# Patient Record
Sex: FEMALE | Race: WHITE | NOT HISPANIC OR LATINO | Employment: OTHER | URBAN - METROPOLITAN AREA
[De-identification: names, ages, dates, MRNs, and addresses within clinical notes are randomized per-mention and may not be internally consistent; named-entity substitution may affect disease eponyms.]

---

## 2018-04-19 PROBLEM — D63.1 ANEMIA IN STAGE 3 CHRONIC KIDNEY DISEASE: Status: ACTIVE | Noted: 2018-04-19

## 2018-04-19 PROBLEM — N18.30 CHRONIC KIDNEY DISEASE, STAGE III (MODERATE): Status: ACTIVE | Noted: 2018-04-19

## 2018-04-19 PROBLEM — N18.30 ANEMIA IN STAGE 3 CHRONIC KIDNEY DISEASE: Status: ACTIVE | Noted: 2018-04-19

## 2018-04-19 PROBLEM — I10 ESSENTIAL HYPERTENSION: Status: ACTIVE | Noted: 2018-04-19

## 2018-04-19 PROBLEM — N25.81 SECONDARY HYPERPARATHYROIDISM OF RENAL ORIGIN: Status: ACTIVE | Noted: 2018-04-19

## 2018-10-18 PROBLEM — N18.2 CKD (CHRONIC KIDNEY DISEASE) STAGE 2, GFR 60-89 ML/MIN: Status: ACTIVE | Noted: 2018-10-18

## 2019-05-23 PROBLEM — N18.2 ANEMIA IN STAGE 2 CHRONIC KIDNEY DISEASE: Status: ACTIVE | Noted: 2018-04-19

## 2019-05-23 PROBLEM — N18.30 CHRONIC KIDNEY DISEASE, STAGE III (MODERATE): Status: RESOLVED | Noted: 2018-04-19 | Resolved: 2019-05-23

## 2020-03-03 PROBLEM — E55.9 VITAMIN D DEFICIENCY: Status: ACTIVE | Noted: 2020-03-03

## 2020-03-03 PROBLEM — N18.30 CHRONIC KIDNEY DISEASE, STAGE III (MODERATE): Status: RESOLVED | Noted: 2018-04-19 | Resolved: 2020-03-03

## 2020-10-01 ENCOUNTER — LAB VISIT (OUTPATIENT)
Dept: LAB | Facility: HOSPITAL | Age: 85
End: 2020-10-01
Attending: INTERNAL MEDICINE
Payer: MEDICARE

## 2020-10-01 DIAGNOSIS — N25.81 SECONDARY HYPERPARATHYROIDISM OF RENAL ORIGIN: ICD-10-CM

## 2020-10-01 DIAGNOSIS — D63.1 ANEMIA IN STAGE 2 CHRONIC KIDNEY DISEASE: ICD-10-CM

## 2020-10-01 DIAGNOSIS — N18.2 ANEMIA IN STAGE 2 CHRONIC KIDNEY DISEASE: ICD-10-CM

## 2020-10-01 DIAGNOSIS — N18.30 CHRONIC KIDNEY DISEASE, STAGE III (MODERATE): ICD-10-CM

## 2020-10-01 DIAGNOSIS — E55.9 VITAMIN D DEFICIENCY: ICD-10-CM

## 2020-10-01 LAB
25(OH)D3+25(OH)D2 SERPL-MCNC: 36 NG/ML (ref 30–96)
ALBUMIN SERPL BCP-MCNC: 3.9 G/DL (ref 3.5–5.2)
ANION GAP SERPL CALC-SCNC: 9 MMOL/L (ref 8–16)
BASOPHILS # BLD AUTO: 0.03 K/UL (ref 0–0.2)
BASOPHILS NFR BLD: 0.6 % (ref 0–1.9)
BUN SERPL-MCNC: 22 MG/DL (ref 8–23)
CALCIUM SERPL-MCNC: 9.5 MG/DL (ref 8.7–10.5)
CALCIUM SERPL-MCNC: 9.5 MG/DL (ref 8.7–10.5)
CHLORIDE SERPL-SCNC: 103 MMOL/L (ref 95–110)
CO2 SERPL-SCNC: 26 MMOL/L (ref 23–29)
CREAT SERPL-MCNC: 0.9 MG/DL (ref 0.5–1.4)
DIFFERENTIAL METHOD: ABNORMAL
EOSINOPHIL # BLD AUTO: 0.2 K/UL (ref 0–0.5)
EOSINOPHIL NFR BLD: 4.5 % (ref 0–8)
ERYTHROCYTE [DISTWIDTH] IN BLOOD BY AUTOMATED COUNT: 13.2 % (ref 11.5–14.5)
EST. GFR  (AFRICAN AMERICAN): >60 ML/MIN/1.73 M^2
EST. GFR  (NON AFRICAN AMERICAN): 59 ML/MIN/1.73 M^2
GLUCOSE SERPL-MCNC: 87 MG/DL (ref 70–110)
HCT VFR BLD AUTO: 36.6 % (ref 37–48.5)
HGB BLD-MCNC: 11.7 G/DL (ref 12–16)
IMM GRANULOCYTES # BLD AUTO: 0.01 K/UL (ref 0–0.04)
IMM GRANULOCYTES NFR BLD AUTO: 0.2 % (ref 0–0.5)
LYMPHOCYTES # BLD AUTO: 1.6 K/UL (ref 1–4.8)
LYMPHOCYTES NFR BLD: 30.5 % (ref 18–48)
MCH RBC QN AUTO: 29 PG (ref 27–31)
MCHC RBC AUTO-ENTMCNC: 32 G/DL (ref 32–36)
MCV RBC AUTO: 91 FL (ref 82–98)
MONOCYTES # BLD AUTO: 0.5 K/UL (ref 0.3–1)
MONOCYTES NFR BLD: 9.8 % (ref 4–15)
NEUTROPHILS # BLD AUTO: 2.8 K/UL (ref 1.8–7.7)
NEUTROPHILS NFR BLD: 54.4 % (ref 38–73)
NRBC BLD-RTO: 0 /100 WBC
PHOSPHATE SERPL-MCNC: 3 MG/DL (ref 2.7–4.5)
PLATELET # BLD AUTO: 202 K/UL (ref 150–350)
PMV BLD AUTO: 12.1 FL (ref 9.2–12.9)
POTASSIUM SERPL-SCNC: 4.5 MMOL/L (ref 3.5–5.1)
PTH-INTACT SERPL-MCNC: 81.5 PG/ML (ref 9–77)
RBC # BLD AUTO: 4.03 M/UL (ref 4–5.4)
SODIUM SERPL-SCNC: 138 MMOL/L (ref 136–145)
WBC # BLD AUTO: 5.08 K/UL (ref 3.9–12.7)

## 2020-10-01 PROCEDURE — 82306 VITAMIN D 25 HYDROXY: CPT

## 2020-10-01 PROCEDURE — 84100 ASSAY OF PHOSPHORUS: CPT

## 2020-10-01 PROCEDURE — 36415 COLL VENOUS BLD VENIPUNCTURE: CPT

## 2020-10-01 PROCEDURE — 85025 COMPLETE CBC W/AUTO DIFF WBC: CPT

## 2020-10-01 PROCEDURE — 82040 ASSAY OF SERUM ALBUMIN: CPT

## 2020-10-01 PROCEDURE — 83970 ASSAY OF PARATHORMONE: CPT

## 2020-10-01 PROCEDURE — 80048 BASIC METABOLIC PNL TOTAL CA: CPT

## 2020-10-08 PROBLEM — D64.9 ANEMIA: Status: ACTIVE | Noted: 2018-04-19

## 2020-10-08 PROBLEM — N18.31 STAGE 3A CHRONIC KIDNEY DISEASE: Status: ACTIVE | Noted: 2018-04-19

## 2020-11-19 PROBLEM — N18.2 CKD (CHRONIC KIDNEY DISEASE) STAGE 2, GFR 60-89 ML/MIN: Status: RESOLVED | Noted: 2018-10-18 | Resolved: 2020-11-19

## 2020-11-19 PROBLEM — I10 ESSENTIAL HYPERTENSION: Chronic | Status: ACTIVE | Noted: 2018-04-19

## 2020-11-19 PROBLEM — N18.31 STAGE 3A CHRONIC KIDNEY DISEASE: Chronic | Status: ACTIVE | Noted: 2018-04-19

## 2020-11-19 PROBLEM — N25.81 SECONDARY HYPERPARATHYROIDISM OF RENAL ORIGIN: Chronic | Status: ACTIVE | Noted: 2018-04-19

## 2021-03-15 ENCOUNTER — LAB VISIT (OUTPATIENT)
Dept: LAB | Facility: HOSPITAL | Age: 86
End: 2021-03-15
Attending: INTERNAL MEDICINE
Payer: MEDICARE

## 2021-03-15 DIAGNOSIS — N18.31 STAGE 3A CHRONIC KIDNEY DISEASE: ICD-10-CM

## 2021-03-15 LAB
BILIRUB UR QL STRIP: NEGATIVE
CLARITY UR REFRACT.AUTO: CLEAR
COLOR UR AUTO: YELLOW
GLUCOSE UR QL STRIP: NEGATIVE
HGB UR QL STRIP: ABNORMAL
KETONES UR QL STRIP: NEGATIVE
LEUKOCYTE ESTERASE UR QL STRIP: ABNORMAL
MICROSCOPIC COMMENT: NORMAL
NITRITE UR QL STRIP: NEGATIVE
PH UR STRIP: 6 [PH] (ref 5–8)
PROT UR QL STRIP: NEGATIVE
RBC #/AREA URNS AUTO: 1 /HPF (ref 0–4)
SP GR UR STRIP: 1.01 (ref 1–1.03)
SQUAMOUS #/AREA URNS AUTO: 1 /HPF
URN SPEC COLLECT METH UR: ABNORMAL
UROBILINOGEN UR STRIP-ACNC: NEGATIVE EU/DL
WBC #/AREA URNS AUTO: 1 /HPF (ref 0–5)

## 2021-05-27 PROBLEM — E78.00 HIGH CHOLESTEROL: Chronic | Status: ACTIVE | Noted: 2021-05-27

## 2021-05-27 PROBLEM — E55.9 VITAMIN D DEFICIENCY: Chronic | Status: ACTIVE | Noted: 2020-03-03

## 2021-05-27 PROBLEM — E78.00 HIGH CHOLESTEROL: Status: ACTIVE | Noted: 2021-05-27

## 2021-09-24 ENCOUNTER — LAB VISIT (OUTPATIENT)
Dept: LAB | Facility: HOSPITAL | Age: 86
End: 2021-09-24
Attending: INTERNAL MEDICINE
Payer: MEDICARE

## 2021-09-24 DIAGNOSIS — I10 ESSENTIAL HYPERTENSION: Chronic | ICD-10-CM

## 2021-09-24 DIAGNOSIS — E55.9 VITAMIN D DEFICIENCY: ICD-10-CM

## 2021-09-24 DIAGNOSIS — E87.20 ACIDOSIS, METABOLIC: ICD-10-CM

## 2021-09-24 DIAGNOSIS — N25.81 SECONDARY HYPERPARATHYROIDISM OF RENAL ORIGIN: Chronic | ICD-10-CM

## 2021-09-24 DIAGNOSIS — D64.9 ANEMIA, UNSPECIFIED TYPE: ICD-10-CM

## 2021-09-24 DIAGNOSIS — N18.31 STAGE 3A CHRONIC KIDNEY DISEASE: Chronic | ICD-10-CM

## 2021-09-24 LAB
25(OH)D3+25(OH)D2 SERPL-MCNC: 49 NG/ML (ref 30–96)
ALBUMIN SERPL BCP-MCNC: 3.9 G/DL (ref 3.5–5.2)
ANION GAP SERPL CALC-SCNC: 9 MMOL/L (ref 8–16)
BASOPHILS # BLD AUTO: 0.02 K/UL (ref 0–0.2)
BASOPHILS NFR BLD: 0.4 % (ref 0–1.9)
BUN SERPL-MCNC: 20 MG/DL (ref 8–23)
CALCIUM SERPL-MCNC: 10.3 MG/DL (ref 8.7–10.5)
CALCIUM SERPL-MCNC: 10.3 MG/DL (ref 8.7–10.5)
CHLORIDE SERPL-SCNC: 103 MMOL/L (ref 95–110)
CO2 SERPL-SCNC: 25 MMOL/L (ref 23–29)
CREAT SERPL-MCNC: 0.9 MG/DL (ref 0.5–1.4)
DIFFERENTIAL METHOD: ABNORMAL
EOSINOPHIL # BLD AUTO: 0.1 K/UL (ref 0–0.5)
EOSINOPHIL NFR BLD: 2.6 % (ref 0–8)
ERYTHROCYTE [DISTWIDTH] IN BLOOD BY AUTOMATED COUNT: 14.2 % (ref 11.5–14.5)
EST. GFR  (AFRICAN AMERICAN): >60 ML/MIN/1.73 M^2
EST. GFR  (NON AFRICAN AMERICAN): 59 ML/MIN/1.73 M^2
GLUCOSE SERPL-MCNC: 87 MG/DL (ref 70–110)
HCT VFR BLD AUTO: 38.2 % (ref 37–48.5)
HGB BLD-MCNC: 12.1 G/DL (ref 12–16)
IMM GRANULOCYTES # BLD AUTO: 0 K/UL (ref 0–0.04)
IMM GRANULOCYTES NFR BLD AUTO: 0 % (ref 0–0.5)
LYMPHOCYTES # BLD AUTO: 1.5 K/UL (ref 1–4.8)
LYMPHOCYTES NFR BLD: 32.9 % (ref 18–48)
MCH RBC QN AUTO: 28.3 PG (ref 27–31)
MCHC RBC AUTO-ENTMCNC: 31.7 G/DL (ref 32–36)
MCV RBC AUTO: 89 FL (ref 82–98)
MONOCYTES # BLD AUTO: 0.4 K/UL (ref 0.3–1)
MONOCYTES NFR BLD: 9.2 % (ref 4–15)
NEUTROPHILS # BLD AUTO: 2.5 K/UL (ref 1.8–7.7)
NEUTROPHILS NFR BLD: 54.9 % (ref 38–73)
NRBC BLD-RTO: 0 /100 WBC
PHOSPHATE SERPL-MCNC: 2.9 MG/DL (ref 2.7–4.5)
PLATELET # BLD AUTO: 208 K/UL (ref 150–450)
PMV BLD AUTO: 11.6 FL (ref 9.2–12.9)
POTASSIUM SERPL-SCNC: 4.3 MMOL/L (ref 3.5–5.1)
PTH-INTACT SERPL-MCNC: 73.8 PG/ML (ref 9–77)
RBC # BLD AUTO: 4.28 M/UL (ref 4–5.4)
SODIUM SERPL-SCNC: 137 MMOL/L (ref 136–145)
WBC # BLD AUTO: 4.59 K/UL (ref 3.9–12.7)

## 2021-09-24 PROCEDURE — 82040 ASSAY OF SERUM ALBUMIN: CPT | Performed by: INTERNAL MEDICINE

## 2021-09-24 PROCEDURE — 84100 ASSAY OF PHOSPHORUS: CPT | Performed by: INTERNAL MEDICINE

## 2021-09-24 PROCEDURE — 82306 VITAMIN D 25 HYDROXY: CPT | Performed by: INTERNAL MEDICINE

## 2021-09-24 PROCEDURE — 80048 BASIC METABOLIC PNL TOTAL CA: CPT | Performed by: INTERNAL MEDICINE

## 2021-09-24 PROCEDURE — 36415 COLL VENOUS BLD VENIPUNCTURE: CPT | Performed by: INTERNAL MEDICINE

## 2021-09-24 PROCEDURE — 83970 ASSAY OF PARATHORMONE: CPT | Performed by: INTERNAL MEDICINE

## 2021-09-24 PROCEDURE — 85025 COMPLETE CBC W/AUTO DIFF WBC: CPT | Performed by: INTERNAL MEDICINE

## 2021-11-12 ENCOUNTER — LAB VISIT (OUTPATIENT)
Dept: LAB | Facility: HOSPITAL | Age: 86
End: 2021-11-12
Attending: INTERNAL MEDICINE
Payer: MEDICARE

## 2021-11-12 DIAGNOSIS — D63.1 ANEMIA DUE TO STAGE 3A CHRONIC KIDNEY DISEASE: ICD-10-CM

## 2021-11-12 DIAGNOSIS — N18.31 STAGE 3A CHRONIC KIDNEY DISEASE: ICD-10-CM

## 2021-11-12 DIAGNOSIS — E55.9 VITAMIN D DEFICIENCY: Chronic | ICD-10-CM

## 2021-11-12 DIAGNOSIS — N25.81 SECONDARY HYPERPARATHYROIDISM OF RENAL ORIGIN: ICD-10-CM

## 2021-11-12 DIAGNOSIS — E87.20 ACIDOSIS, METABOLIC: ICD-10-CM

## 2021-11-12 DIAGNOSIS — N18.31 ANEMIA DUE TO STAGE 3A CHRONIC KIDNEY DISEASE: ICD-10-CM

## 2021-11-12 DIAGNOSIS — I10 PRIMARY HYPERTENSION: ICD-10-CM

## 2021-11-12 LAB
25(OH)D3+25(OH)D2 SERPL-MCNC: 43 NG/ML (ref 30–96)
ALBUMIN SERPL BCP-MCNC: 3.9 G/DL (ref 3.5–5.2)
ANION GAP SERPL CALC-SCNC: 8 MMOL/L (ref 8–16)
BASOPHILS # BLD AUTO: 0.03 K/UL (ref 0–0.2)
BASOPHILS NFR BLD: 0.6 % (ref 0–1.9)
BUN SERPL-MCNC: 23 MG/DL (ref 8–23)
CALCIUM SERPL-MCNC: 9.8 MG/DL (ref 8.7–10.5)
CALCIUM SERPL-MCNC: 9.8 MG/DL (ref 8.7–10.5)
CHLORIDE SERPL-SCNC: 102 MMOL/L (ref 95–110)
CO2 SERPL-SCNC: 25 MMOL/L (ref 23–29)
CREAT SERPL-MCNC: 0.9 MG/DL (ref 0.5–1.4)
DIFFERENTIAL METHOD: ABNORMAL
EOSINOPHIL # BLD AUTO: 0.1 K/UL (ref 0–0.5)
EOSINOPHIL NFR BLD: 2.2 % (ref 0–8)
ERYTHROCYTE [DISTWIDTH] IN BLOOD BY AUTOMATED COUNT: 13.2 % (ref 11.5–14.5)
EST. GFR  (AFRICAN AMERICAN): >60 ML/MIN/1.73 M^2
EST. GFR  (NON AFRICAN AMERICAN): 59 ML/MIN/1.73 M^2
GLUCOSE SERPL-MCNC: 95 MG/DL (ref 70–110)
HCT VFR BLD AUTO: 35.4 % (ref 37–48.5)
HGB BLD-MCNC: 11.8 G/DL (ref 12–16)
IMM GRANULOCYTES # BLD AUTO: 0 K/UL (ref 0–0.04)
IMM GRANULOCYTES NFR BLD AUTO: 0 % (ref 0–0.5)
LYMPHOCYTES # BLD AUTO: 1.5 K/UL (ref 1–4.8)
LYMPHOCYTES NFR BLD: 29.2 % (ref 18–48)
MCH RBC QN AUTO: 29.7 PG (ref 27–31)
MCHC RBC AUTO-ENTMCNC: 33.3 G/DL (ref 32–36)
MCV RBC AUTO: 89 FL (ref 82–98)
MONOCYTES # BLD AUTO: 0.4 K/UL (ref 0.3–1)
MONOCYTES NFR BLD: 8.6 % (ref 4–15)
NEUTROPHILS # BLD AUTO: 3 K/UL (ref 1.8–7.7)
NEUTROPHILS NFR BLD: 59.4 % (ref 38–73)
NRBC BLD-RTO: 0 /100 WBC
PHOSPHATE SERPL-MCNC: 2.7 MG/DL (ref 2.7–4.5)
PLATELET # BLD AUTO: 203 K/UL (ref 150–450)
PMV BLD AUTO: 11.3 FL (ref 9.2–12.9)
POTASSIUM SERPL-SCNC: 4.1 MMOL/L (ref 3.5–5.1)
PTH-INTACT SERPL-MCNC: 69.4 PG/ML (ref 9–77)
RBC # BLD AUTO: 3.97 M/UL (ref 4–5.4)
SODIUM SERPL-SCNC: 135 MMOL/L (ref 136–145)
WBC # BLD AUTO: 5.1 K/UL (ref 3.9–12.7)

## 2021-11-12 PROCEDURE — 83970 ASSAY OF PARATHORMONE: CPT | Performed by: INTERNAL MEDICINE

## 2021-11-12 PROCEDURE — 84100 ASSAY OF PHOSPHORUS: CPT | Performed by: INTERNAL MEDICINE

## 2021-11-12 PROCEDURE — 80048 BASIC METABOLIC PNL TOTAL CA: CPT | Performed by: INTERNAL MEDICINE

## 2021-11-12 PROCEDURE — 82306 VITAMIN D 25 HYDROXY: CPT | Performed by: INTERNAL MEDICINE

## 2021-11-12 PROCEDURE — 36415 COLL VENOUS BLD VENIPUNCTURE: CPT | Performed by: INTERNAL MEDICINE

## 2021-11-12 PROCEDURE — 82040 ASSAY OF SERUM ALBUMIN: CPT | Performed by: INTERNAL MEDICINE

## 2021-11-12 PROCEDURE — 85025 COMPLETE CBC W/AUTO DIFF WBC: CPT | Performed by: INTERNAL MEDICINE

## 2022-03-29 ENCOUNTER — LAB VISIT (OUTPATIENT)
Dept: LAB | Facility: HOSPITAL | Age: 87
End: 2022-03-29
Attending: INTERNAL MEDICINE
Payer: MEDICARE

## 2022-03-29 DIAGNOSIS — N18.31 STAGE 3A CHRONIC KIDNEY DISEASE: Chronic | ICD-10-CM

## 2022-03-29 LAB
BILIRUB UR QL STRIP: NEGATIVE
CLARITY UR: ABNORMAL
COLOR UR: COLORLESS
GLUCOSE UR QL STRIP: NEGATIVE
HGB UR QL STRIP: NEGATIVE
KETONES UR QL STRIP: NEGATIVE
LEUKOCYTE ESTERASE UR QL STRIP: ABNORMAL
MICROSCOPIC COMMENT: NORMAL
NITRITE UR QL STRIP: NEGATIVE
PH UR STRIP: 6 [PH] (ref 5–8)
PROT UR QL STRIP: NEGATIVE
SP GR UR STRIP: <1.005 (ref 1–1.03)
URN SPEC COLLECT METH UR: ABNORMAL
UROBILINOGEN UR STRIP-ACNC: NEGATIVE EU/DL
WBC #/AREA URNS HPF: 2 /HPF (ref 0–5)

## 2022-03-29 PROCEDURE — 81000 URINALYSIS NONAUTO W/SCOPE: CPT | Performed by: INTERNAL MEDICINE

## 2022-04-05 PROBLEM — I10 ESSENTIAL HYPERTENSION: Status: ACTIVE | Noted: 2022-04-05

## 2022-04-05 PROBLEM — N18.31 ANEMIA DUE TO STAGE 3A CHRONIC KIDNEY DISEASE: Status: ACTIVE | Noted: 2018-04-19

## 2022-04-05 PROBLEM — N25.81 SECONDARY HYPERPARATHYROIDISM OF RENAL ORIGIN: Status: ACTIVE | Noted: 2022-04-05

## 2022-04-05 PROBLEM — I10 PRIMARY HYPERTENSION: Chronic | Status: RESOLVED | Noted: 2018-04-19 | Resolved: 2022-04-05

## 2022-04-05 PROBLEM — N25.81 HYPERPARATHYROIDISM DUE TO RENAL INSUFFICIENCY: Chronic | Status: RESOLVED | Noted: 2018-04-19 | Resolved: 2022-04-05

## 2022-04-05 PROBLEM — E87.20 ACIDOSIS, METABOLIC: Status: ACTIVE | Noted: 2022-04-05

## 2022-08-01 ENCOUNTER — LAB VISIT (OUTPATIENT)
Dept: LAB | Facility: HOSPITAL | Age: 87
End: 2022-08-01
Attending: INTERNAL MEDICINE
Payer: MEDICARE

## 2022-08-01 DIAGNOSIS — D63.1 ANEMIA DUE TO STAGE 3A CHRONIC KIDNEY DISEASE: ICD-10-CM

## 2022-08-01 DIAGNOSIS — E87.20 ACIDOSIS, METABOLIC: ICD-10-CM

## 2022-08-01 DIAGNOSIS — N18.31 ANEMIA DUE TO STAGE 3A CHRONIC KIDNEY DISEASE: ICD-10-CM

## 2022-08-01 DIAGNOSIS — N18.31 STAGE 3A CHRONIC KIDNEY DISEASE: Chronic | ICD-10-CM

## 2022-08-01 DIAGNOSIS — N25.81 SECONDARY HYPERPARATHYROIDISM OF RENAL ORIGIN: ICD-10-CM

## 2022-08-01 DIAGNOSIS — E55.9 VITAMIN D DEFICIENCY: Chronic | ICD-10-CM

## 2022-08-01 DIAGNOSIS — I10 ESSENTIAL HYPERTENSION: ICD-10-CM

## 2022-08-01 LAB
25(OH)D3+25(OH)D2 SERPL-MCNC: 45 NG/ML (ref 30–96)
ALBUMIN SERPL BCP-MCNC: 3.8 G/DL (ref 3.5–5.2)
ANION GAP SERPL CALC-SCNC: 7 MMOL/L (ref 8–16)
BASOPHILS # BLD AUTO: 0.03 K/UL (ref 0–0.2)
BASOPHILS NFR BLD: 0.7 % (ref 0–1.9)
BUN SERPL-MCNC: 23 MG/DL (ref 8–23)
CALCIUM SERPL-MCNC: 9.9 MG/DL (ref 8.7–10.5)
CALCIUM SERPL-MCNC: 9.9 MG/DL (ref 8.7–10.5)
CHLORIDE SERPL-SCNC: 104 MMOL/L (ref 95–110)
CO2 SERPL-SCNC: 25 MMOL/L (ref 23–29)
CREAT SERPL-MCNC: 0.9 MG/DL (ref 0.5–1.4)
DIFFERENTIAL METHOD: ABNORMAL
EOSINOPHIL # BLD AUTO: 0.1 K/UL (ref 0–0.5)
EOSINOPHIL NFR BLD: 2.2 % (ref 0–8)
ERYTHROCYTE [DISTWIDTH] IN BLOOD BY AUTOMATED COUNT: 13.5 % (ref 11.5–14.5)
EST. GFR  (NO RACE VARIABLE): >60 ML/MIN/1.73 M^2
GLUCOSE SERPL-MCNC: 84 MG/DL (ref 70–110)
HCT VFR BLD AUTO: 35.4 % (ref 37–48.5)
HGB BLD-MCNC: 11.6 G/DL (ref 12–16)
IMM GRANULOCYTES # BLD AUTO: 0.01 K/UL (ref 0–0.04)
IMM GRANULOCYTES NFR BLD AUTO: 0.2 % (ref 0–0.5)
LYMPHOCYTES # BLD AUTO: 1.5 K/UL (ref 1–4.8)
LYMPHOCYTES NFR BLD: 32.3 % (ref 18–48)
MCH RBC QN AUTO: 30.2 PG (ref 27–31)
MCHC RBC AUTO-ENTMCNC: 32.8 G/DL (ref 32–36)
MCV RBC AUTO: 92 FL (ref 82–98)
MONOCYTES # BLD AUTO: 0.4 K/UL (ref 0.3–1)
MONOCYTES NFR BLD: 9.5 % (ref 4–15)
NEUTROPHILS # BLD AUTO: 2.5 K/UL (ref 1.8–7.7)
NEUTROPHILS NFR BLD: 55.1 % (ref 38–73)
NRBC BLD-RTO: 0 /100 WBC
PHOSPHATE SERPL-MCNC: 2.8 MG/DL (ref 2.7–4.5)
PLATELET # BLD AUTO: 200 K/UL (ref 150–450)
PMV BLD AUTO: 11.3 FL (ref 9.2–12.9)
POTASSIUM SERPL-SCNC: 4.4 MMOL/L (ref 3.5–5.1)
PTH-INTACT SERPL-MCNC: 63.6 PG/ML (ref 9–77)
RBC # BLD AUTO: 3.84 M/UL (ref 4–5.4)
SODIUM SERPL-SCNC: 136 MMOL/L (ref 136–145)
WBC # BLD AUTO: 4.55 K/UL (ref 3.9–12.7)

## 2022-08-01 PROCEDURE — 83970 ASSAY OF PARATHORMONE: CPT | Performed by: INTERNAL MEDICINE

## 2022-08-01 PROCEDURE — 84100 ASSAY OF PHOSPHORUS: CPT | Performed by: INTERNAL MEDICINE

## 2022-08-01 PROCEDURE — 80048 BASIC METABOLIC PNL TOTAL CA: CPT | Performed by: INTERNAL MEDICINE

## 2022-08-01 PROCEDURE — 36415 COLL VENOUS BLD VENIPUNCTURE: CPT | Performed by: INTERNAL MEDICINE

## 2022-08-01 PROCEDURE — 82040 ASSAY OF SERUM ALBUMIN: CPT | Performed by: INTERNAL MEDICINE

## 2022-08-01 PROCEDURE — 85025 COMPLETE CBC W/AUTO DIFF WBC: CPT | Performed by: INTERNAL MEDICINE

## 2022-08-01 PROCEDURE — 82306 VITAMIN D 25 HYDROXY: CPT | Performed by: INTERNAL MEDICINE

## 2022-08-17 PROCEDURE — 93005 ELECTROCARDIOGRAM TRACING: CPT

## 2022-08-17 PROCEDURE — 93010 EKG 12-LEAD: ICD-10-PCS | Mod: ,,, | Performed by: INTERNAL MEDICINE

## 2022-08-17 PROCEDURE — 99285 EMERGENCY DEPT VISIT HI MDM: CPT | Mod: 25

## 2022-08-17 PROCEDURE — 93010 ELECTROCARDIOGRAM REPORT: CPT | Mod: ,,, | Performed by: INTERNAL MEDICINE

## 2022-08-17 PROCEDURE — 96360 HYDRATION IV INFUSION INIT: CPT

## 2022-08-18 ENCOUNTER — HOSPITAL ENCOUNTER (EMERGENCY)
Facility: HOSPITAL | Age: 87
Discharge: HOME | End: 2022-08-18
Attending: EMERGENCY MEDICINE
Payer: MEDICARE

## 2022-08-18 VITALS
OXYGEN SATURATION: 99 % | RESPIRATION RATE: 20 BRPM | SYSTOLIC BLOOD PRESSURE: 154 MMHG | HEART RATE: 105 BPM | TEMPERATURE: 98 F | DIASTOLIC BLOOD PRESSURE: 79 MMHG

## 2022-08-18 DIAGNOSIS — R00.2 PALPITATIONS: ICD-10-CM

## 2022-08-18 LAB
ALBUMIN SERPL BCP-MCNC: 4.5 G/DL (ref 3.5–5.2)
ALP SERPL-CCNC: 85 U/L (ref 55–135)
ALT SERPL W/O P-5'-P-CCNC: 14 U/L (ref 10–44)
ANION GAP SERPL CALC-SCNC: 15 MMOL/L (ref 8–16)
AST SERPL-CCNC: 19 U/L (ref 10–40)
BASOPHILS # BLD AUTO: 0.04 K/UL (ref 0–0.2)
BASOPHILS NFR BLD: 0.5 % (ref 0–1.9)
BILIRUB SERPL-MCNC: 0.6 MG/DL (ref 0.1–1)
BILIRUB UR QL STRIP: NEGATIVE
BUN SERPL-MCNC: 32 MG/DL (ref 8–23)
CALCIUM SERPL-MCNC: 10.7 MG/DL (ref 8.7–10.5)
CHLORIDE SERPL-SCNC: 103 MMOL/L (ref 95–110)
CLARITY UR: CLEAR
CO2 SERPL-SCNC: 23 MMOL/L (ref 23–29)
COLOR UR: YELLOW
CREAT SERPL-MCNC: 1.1 MG/DL (ref 0.5–1.4)
DIFFERENTIAL METHOD: NORMAL
EOSINOPHIL # BLD AUTO: 0.1 K/UL (ref 0–0.5)
EOSINOPHIL NFR BLD: 0.9 % (ref 0–8)
ERYTHROCYTE [DISTWIDTH] IN BLOOD BY AUTOMATED COUNT: 13.5 % (ref 11.5–14.5)
EST. GFR  (NO RACE VARIABLE): 49 ML/MIN/1.73 M^2
GLUCOSE SERPL-MCNC: 95 MG/DL (ref 70–110)
GLUCOSE UR QL STRIP: NEGATIVE
HCT VFR BLD AUTO: 38.8 % (ref 37–48.5)
HGB BLD-MCNC: 12.7 G/DL (ref 12–16)
HGB UR QL STRIP: NEGATIVE
IMM GRANULOCYTES # BLD AUTO: 0.02 K/UL (ref 0–0.04)
IMM GRANULOCYTES NFR BLD AUTO: 0.3 % (ref 0–0.5)
KETONES UR QL STRIP: NEGATIVE
LEUKOCYTE ESTERASE UR QL STRIP: NEGATIVE
LYMPHOCYTES # BLD AUTO: 1.7 K/UL (ref 1–4.8)
LYMPHOCYTES NFR BLD: 22.6 % (ref 18–48)
MAGNESIUM SERPL-MCNC: 1.9 MG/DL (ref 1.6–2.6)
MCH RBC QN AUTO: 29.3 PG (ref 27–31)
MCHC RBC AUTO-ENTMCNC: 32.7 G/DL (ref 32–36)
MCV RBC AUTO: 90 FL (ref 82–98)
MONOCYTES # BLD AUTO: 0.5 K/UL (ref 0.3–1)
MONOCYTES NFR BLD: 6.7 % (ref 4–15)
NEUTROPHILS # BLD AUTO: 5.1 K/UL (ref 1.8–7.7)
NEUTROPHILS NFR BLD: 69 % (ref 38–73)
NITRITE UR QL STRIP: NEGATIVE
NRBC BLD-RTO: 0 /100 WBC
PH UR STRIP: 5 [PH] (ref 5–8)
PLATELET # BLD AUTO: 244 K/UL (ref 150–450)
PMV BLD AUTO: 11.6 FL (ref 9.2–12.9)
POTASSIUM SERPL-SCNC: 3.9 MMOL/L (ref 3.5–5.1)
PROT SERPL-MCNC: 7.8 G/DL (ref 6–8.4)
PROT UR QL STRIP: NEGATIVE
RBC # BLD AUTO: 4.33 M/UL (ref 4–5.4)
SODIUM SERPL-SCNC: 141 MMOL/L (ref 136–145)
SP GR UR STRIP: 1.01 (ref 1–1.03)
TROPONIN I SERPL DL<=0.01 NG/ML-MCNC: 0.01 NG/ML (ref 0–0.03)
TROPONIN I SERPL DL<=0.01 NG/ML-MCNC: 0.01 NG/ML (ref 0–0.03)
URN SPEC COLLECT METH UR: NORMAL
UROBILINOGEN UR STRIP-ACNC: NEGATIVE EU/DL
WBC # BLD AUTO: 7.44 K/UL (ref 3.9–12.7)

## 2022-08-18 PROCEDURE — 84484 ASSAY OF TROPONIN QUANT: CPT | Performed by: EMERGENCY MEDICINE

## 2022-08-18 PROCEDURE — 84484 ASSAY OF TROPONIN QUANT: CPT | Mod: 91 | Performed by: EMERGENCY MEDICINE

## 2022-08-18 PROCEDURE — 85025 COMPLETE CBC W/AUTO DIFF WBC: CPT | Performed by: EMERGENCY MEDICINE

## 2022-08-18 PROCEDURE — 81003 URINALYSIS AUTO W/O SCOPE: CPT | Performed by: EMERGENCY MEDICINE

## 2022-08-18 PROCEDURE — 83735 ASSAY OF MAGNESIUM: CPT | Performed by: EMERGENCY MEDICINE

## 2022-08-18 PROCEDURE — 80053 COMPREHEN METABOLIC PANEL: CPT | Performed by: EMERGENCY MEDICINE

## 2022-08-18 PROCEDURE — 25000003 PHARM REV CODE 250: Performed by: EMERGENCY MEDICINE

## 2022-08-18 RX ADMIN — SODIUM CHLORIDE 500 ML: 0.9 INJECTION, SOLUTION INTRAVENOUS at 02:08

## 2022-08-18 NOTE — DISCHARGE INSTRUCTIONS
Please call your primary care physician in the morning for a follow-up appointment for further evaluation and management.  Please return with any new or worsening symptoms.

## 2022-08-18 NOTE — ED TRIAGE NOTES
Pt presents to ED for evaluation of palpitations. Pt reports she was in her kitchen when she felt a flutter in her chest. Pt reports recent change in medication recently. Pt reports on 8/10/2022 she was started on HCTZ. Pt reports that the HCTZ replaced her amlodipine and metoprolol. Pt denies palpitations, CP, SOB, N/V and other symptoms at this time. Pt denies pain.     Patient identifiers for Taiwo Boyd verified by spelling and stated name on armband along with .     Review of patient's allergies indicates:   Allergen Reactions    Codeine phosphate Rash and Other (See Comments)        APPEARANCE: Alert, oriented and in no acute distress.  CARDIAC: Normal rate and rhythm,.  PERIPHERAL VASCULAR: peripheral pulses present. Normal cap refill. No edema. Warm to touch.    RESPIRATORY:Normal rate and effort, breath sounds clear bilaterally throughout chest. Respirations are equal and unlabored no obvious signs of distress.  GASTRO: soft, bowel sounds normal, no tenderness, no abdominal distention.  MUSC: Full ROM. No bony tenderness or soft tissue tenderness. No obvious deformity.  SKIN: Skin is warm and dry, normal skin turgor, mucous membranes moist.  NEURO: 5/5 strength major flexors/extensors bilaterally. Sensory intact to light touch bilaterally. [place] coma scale: eyes open spontaneously-4, oriented & converses-5, obeys commands-6. No neurological abnormalities.   MENTAL STATUS: awake, alert and aware of environment.  EYE: PERRL, both eyes: pupils brisk and reactive to light. Normal size.    Patient verbalized understanding of status and plan of care. Patient side rails are up x 2, bed is low and locked, call light is in reach. Cardiac monitor (alarms on, set, and audible), pulse oximeter, and automatic blood pressure cuff applied.   Will continue to monitor.

## 2022-08-18 NOTE — ED PROVIDER NOTES
Encounter Date: 8/17/2022       History     Chief Complaint   Patient presents with    Palpitations     Patient states on 8/10 she was started on Hydrochlorothiazide. Told to stop taking Amlodipine and Metoprolol due to leg swelling. States when she got up tonight and went into kitchen she started to feel heart palpitations. States normally HR is in the 50 s. Denies chest pain, palpitations, or SOB.      87-year-old female presents emergency department complaining of palpitations.  Notes recent change in her blood pressure medicines.  States a few times today she has felt like her heart was racing and this worsened this evening prompting her to come to the department.  No other symptoms reported.  Denies any headache, lightheadedness, dizziness, sore throat, cough, congestion, chest pain, abdominal pain, nausea, vomiting, diarrhea.        Review of patient's allergies indicates:   Allergen Reactions    Codeine phosphate Rash and Other (See Comments)     Past Medical History:   Diagnosis Date    Anemia in stage 3 chronic kidney disease 4/19/2018    CKD (chronic kidney disease) stage 3, GFR 30-59 ml/min     Disorder of kidney and ureter     Hypertension     Hyponatremia     Secondary hyperparathyroidism      Past Surgical History:   Procedure Laterality Date    APPENDECTOMY      HYSTERECTOMY      TONSILLECTOMY       Family History   Family history unknown: Yes     Social History     Tobacco Use    Smoking status: Never Smoker    Smokeless tobacco: Never Used   Substance Use Topics    Alcohol use: No    Drug use: No     Review of Systems   Constitutional: Negative for chills, fatigue and fever.   HENT: Negative for congestion and sore throat.    Eyes: Negative for photophobia and visual disturbance.   Respiratory: Negative for cough and shortness of breath.    Cardiovascular: Positive for palpitations. Negative for chest pain.   Gastrointestinal: Negative for abdominal pain, diarrhea and vomiting.    Musculoskeletal: Negative for back pain, neck pain and neck stiffness.   Neurological: Negative for light-headedness, numbness and headaches.       Physical Exam     Initial Vitals   BP Pulse Resp Temp SpO2   08/17/22 2203 08/17/22 2203 08/17/22 2203 08/18/22 0247 08/17/22 2203   (!) 143/78 73 18 98 °F (36.7 °C) 100 %      MAP       --                Physical Exam    Nursing note and vitals reviewed.  Constitutional: She appears well-developed and well-nourished. No distress.   HENT:   Head: Normocephalic and atraumatic.   Eyes: Conjunctivae and EOM are normal. Pupils are equal, round, and reactive to light.   Neck: Neck supple. No tracheal deviation present.   Normal range of motion.  Cardiovascular: Normal rate and intact distal pulses.   Pulmonary/Chest: No respiratory distress.   Musculoskeletal:         General: No tenderness or edema. Normal range of motion.      Cervical back: Normal range of motion and neck supple.     Neurological: She is alert and oriented to person, place, and time. She has normal strength. No cranial nerve deficit. GCS score is 15. GCS eye subscore is 4. GCS verbal subscore is 5. GCS motor subscore is 6.   Skin: Skin is warm and dry.         ED Course   Procedures  Labs Reviewed   COMPREHENSIVE METABOLIC PANEL - Abnormal; Notable for the following components:       Result Value    BUN 32 (*)     Calcium 10.7 (*)     eGFR 49 (*)     All other components within normal limits   CBC W/ AUTO DIFFERENTIAL   URINALYSIS   TROPONIN I   MAGNESIUM   TROPONIN I     EKG Readings: (Independently Interpreted)   Initial Reading: No STEMI. Previous EKG Date: No prior available for comparison. Rhythm: Normal Sinus Rhythm. Heart Rate: 64. Ectopy: No Ectopy. Conduction: 1st Degree AV Block. ST Segments: Normal ST Segments. Axis: Normal.         X-Rays:   Independently Interpreted Readings:   Other Readings:  Chest x-ray interpreted by radiologist and visualized by me:     Imaging Results          X-Ray  Chest PA And Lateral (Final result)  Result time 08/17/22 23:08:18    Final result by Jonathan Simmons MD (08/17/22 23:08:18)                 Impression:      No convincing radiographic evidence of acute intrathoracic process.      Electronically signed by: Jonathan Simmons MD  Date:    08/17/2022  Time:    23:08             Narrative:    EXAMINATION:  XR CHEST PA AND LATERAL    CLINICAL HISTORY:  Palpitations;    TECHNIQUE:  PA and lateral views of the chest were performed.    COMPARISON:  None    FINDINGS:  The cardiac silhouette is not significantly enlarged.  There is mild atherosclerotic calcification of the thoracic aorta.  There is a presumed moderate-sized hiatal hernia present.  Lungs demonstrate mild coarse interstitial attenuation.  No large confluent airspace consolidation identified.  No significant volume of pleural fluid or pneumothorax identified.  Osseous structures demonstrate osteopenia and degenerative changes.                                Medications   sodium chloride 0.9% bolus 500 mL (0 mLs Intravenous Stopped 8/18/22 0343)     Medical Decision Making:   Initial Assessment:   87-year-old female presents emergency department complaining of palpitations  Differential Diagnosis:   ACS, dissection, pneumonia, pneumothorax, dehydration, electrolyte dyscrasias  Independently Interpreted Test(s):   I have ordered and independently interpreted X-rays - see prior notes.  I have ordered and independently interpreted EKG Reading(s) - see prior notes  Clinical Tests:   Lab Tests: Reviewed       <> Summary of Lab: Benign  ED Management:  Patient given some IV fluid.  Vital signs remained stable.  Informed patient of results well as plan to discharge with instructions on home management, follow up with primary care physician, strict return precautions.  Patient expressed good understanding and is comfortable with discharge at this time.                      Clinical Impression:   Final  diagnoses:  [R00.2] Palpitations          ED Disposition Condition    Discharge Stable        ED Prescriptions     None        Follow-up Information     Follow up With Specialties Details Why Contact Info    Fernando Lamas MD Family Medicine Schedule an appointment as soon as possible for a visit   [address]  [address]  [facility] [address] [phone]             Theron Gill MD  08/18/22 3770

## 2022-08-18 NOTE — FIRST PROVIDER EVALUATION
Medical screening exam completed.  I have conducted a focused provider triage encounter, findings are as follows:    Brief history of present illness:  87-year-old female presents emergency department complaining of palpitations.  Notes recent change in her blood pressure medicines.  States a few times today she has felt like her heart was racing and this worsened this evening prompting her to come to the department.  No other symptoms reported.  Denies any headache, lightheadedness, dizziness, sore throat, cough, congestion, chest pain, abdominal pain, nausea, vomiting, diarrhea.    Vitals:    08/17/22 2203   BP: (!) 143/78   Pulse: 73   Resp: 18   SpO2: 100%       Pertinent physical exam:  Patient is awake, alert, oriented x3, no apparent distress.  Heart rate regular, pulses palpable, intact bilaterally.  Respirations even nonlabored, speaks full sentences without difficulty.    Brief workup plan:  EKG, chest x-ray, labs    EKG: HR 64, NSR, no ectopy, 1st degree AV block, no ST changes, normal axis, no prior available for comparison    Preliminary workup initiated; this workup will be continued and followed by the physician or advanced practice provider that is assigned to the patient when roomed.

## 2022-11-07 ENCOUNTER — LAB VISIT (OUTPATIENT)
Dept: LAB | Facility: HOSPITAL | Age: 87
End: 2022-11-07
Attending: FAMILY MEDICINE
Payer: MEDICARE

## 2022-11-07 DIAGNOSIS — N18.31 STAGE 3A CHRONIC KIDNEY DISEASE: Chronic | ICD-10-CM

## 2022-11-07 DIAGNOSIS — E78.00 HIGH CHOLESTEROL: Chronic | ICD-10-CM

## 2022-11-07 DIAGNOSIS — N18.31 ANEMIA DUE TO STAGE 3A CHRONIC KIDNEY DISEASE: ICD-10-CM

## 2022-11-07 DIAGNOSIS — D63.1 ANEMIA DUE TO STAGE 3A CHRONIC KIDNEY DISEASE: ICD-10-CM

## 2022-11-07 PROBLEM — E87.20 ACIDOSIS, METABOLIC: Status: RESOLVED | Noted: 2022-04-05 | Resolved: 2022-11-07

## 2022-11-07 LAB
ALBUMIN SERPL BCP-MCNC: 3.9 G/DL (ref 3.5–5.2)
ALP SERPL-CCNC: 90 U/L (ref 55–135)
ALT SERPL W/O P-5'-P-CCNC: 16 U/L (ref 10–44)
ANION GAP SERPL CALC-SCNC: 11 MMOL/L (ref 8–16)
AST SERPL-CCNC: 23 U/L (ref 10–40)
BASOPHILS # BLD AUTO: 0.03 K/UL (ref 0–0.2)
BASOPHILS NFR BLD: 0.6 % (ref 0–1.9)
BILIRUB SERPL-MCNC: 0.5 MG/DL (ref 0.1–1)
BUN SERPL-MCNC: 29 MG/DL (ref 8–23)
CALCIUM SERPL-MCNC: 10.2 MG/DL (ref 8.7–10.5)
CHLORIDE SERPL-SCNC: 104 MMOL/L (ref 95–110)
CHOLEST SERPL-MCNC: 173 MG/DL (ref 120–199)
CHOLEST/HDLC SERPL: 2.8 {RATIO} (ref 2–5)
CO2 SERPL-SCNC: 26 MMOL/L (ref 23–29)
CREAT SERPL-MCNC: 1.1 MG/DL (ref 0.5–1.4)
DIFFERENTIAL METHOD: NORMAL
EOSINOPHIL # BLD AUTO: 0.2 K/UL (ref 0–0.5)
EOSINOPHIL NFR BLD: 2.8 % (ref 0–8)
ERYTHROCYTE [DISTWIDTH] IN BLOOD BY AUTOMATED COUNT: 13.2 % (ref 11.5–14.5)
EST. GFR  (NO RACE VARIABLE): 49 ML/MIN/1.73 M^2
ESTIMATED AVG GLUCOSE: 114 MG/DL (ref 68–131)
GLUCOSE SERPL-MCNC: 116 MG/DL (ref 70–110)
HBA1C MFR BLD: 5.6 % (ref 4–5.6)
HCT VFR BLD AUTO: 37.3 % (ref 37–48.5)
HDLC SERPL-MCNC: 61 MG/DL (ref 40–75)
HDLC SERPL: 35.3 % (ref 20–50)
HGB BLD-MCNC: 12.2 G/DL (ref 12–16)
IMM GRANULOCYTES # BLD AUTO: 0.01 K/UL (ref 0–0.04)
IMM GRANULOCYTES NFR BLD AUTO: 0.2 % (ref 0–0.5)
LDLC SERPL CALC-MCNC: 88.6 MG/DL (ref 63–159)
LYMPHOCYTES # BLD AUTO: 1.7 K/UL (ref 1–4.8)
LYMPHOCYTES NFR BLD: 31.9 % (ref 18–48)
MCH RBC QN AUTO: 29.4 PG (ref 27–31)
MCHC RBC AUTO-ENTMCNC: 32.7 G/DL (ref 32–36)
MCV RBC AUTO: 90 FL (ref 82–98)
MONOCYTES # BLD AUTO: 0.5 K/UL (ref 0.3–1)
MONOCYTES NFR BLD: 9.8 % (ref 4–15)
NEUTROPHILS # BLD AUTO: 3 K/UL (ref 1.8–7.7)
NEUTROPHILS NFR BLD: 54.7 % (ref 38–73)
NONHDLC SERPL-MCNC: 112 MG/DL
NRBC BLD-RTO: 0 /100 WBC
PLATELET # BLD AUTO: 245 K/UL (ref 150–450)
PMV BLD AUTO: 11.3 FL (ref 9.2–12.9)
POTASSIUM SERPL-SCNC: 4.6 MMOL/L (ref 3.5–5.1)
PROT SERPL-MCNC: 7.6 G/DL (ref 6–8.4)
RBC # BLD AUTO: 4.15 M/UL (ref 4–5.4)
SODIUM SERPL-SCNC: 141 MMOL/L (ref 136–145)
TRIGL SERPL-MCNC: 117 MG/DL (ref 30–150)
WBC # BLD AUTO: 5.42 K/UL (ref 3.9–12.7)

## 2022-11-07 PROCEDURE — 36415 COLL VENOUS BLD VENIPUNCTURE: CPT | Performed by: FAMILY MEDICINE

## 2022-11-07 PROCEDURE — 80053 COMPREHEN METABOLIC PANEL: CPT | Performed by: FAMILY MEDICINE

## 2022-11-07 PROCEDURE — 80061 LIPID PANEL: CPT | Performed by: FAMILY MEDICINE

## 2022-11-07 PROCEDURE — 85025 COMPLETE CBC W/AUTO DIFF WBC: CPT | Performed by: FAMILY MEDICINE

## 2022-11-07 PROCEDURE — 83036 HEMOGLOBIN GLYCOSYLATED A1C: CPT | Performed by: FAMILY MEDICINE

## 2022-12-08 ENCOUNTER — LAB VISIT (OUTPATIENT)
Dept: LAB | Facility: HOSPITAL | Age: 87
End: 2022-12-08
Attending: INTERNAL MEDICINE
Payer: MEDICARE

## 2022-12-08 DIAGNOSIS — N18.31 STAGE 3A CHRONIC KIDNEY DISEASE: Chronic | ICD-10-CM

## 2022-12-08 LAB
BILIRUB UR QL STRIP: NEGATIVE
CLARITY UR: ABNORMAL
COLOR UR: YELLOW
GLUCOSE UR QL STRIP: NEGATIVE
HGB UR QL STRIP: NEGATIVE
KETONES UR QL STRIP: NEGATIVE
LEUKOCYTE ESTERASE UR QL STRIP: NEGATIVE
NITRITE UR QL STRIP: NEGATIVE
PH UR STRIP: 5 [PH] (ref 5–8)
PROT UR QL STRIP: NEGATIVE
SP GR UR STRIP: 1.01 (ref 1–1.03)
URN SPEC COLLECT METH UR: ABNORMAL
UROBILINOGEN UR STRIP-ACNC: NEGATIVE EU/DL

## 2022-12-08 PROCEDURE — 81003 URINALYSIS AUTO W/O SCOPE: CPT | Performed by: INTERNAL MEDICINE

## 2023-03-20 ENCOUNTER — LAB VISIT (OUTPATIENT)
Dept: LAB | Facility: HOSPITAL | Age: 88
End: 2023-03-20
Attending: FAMILY MEDICINE
Payer: MEDICARE

## 2023-03-20 DIAGNOSIS — D63.1 ANEMIA DUE TO STAGE 3A CHRONIC KIDNEY DISEASE: ICD-10-CM

## 2023-03-20 DIAGNOSIS — Z79.899 OTHER LONG TERM (CURRENT) DRUG THERAPY: ICD-10-CM

## 2023-03-20 DIAGNOSIS — I10 ESSENTIAL HYPERTENSION: ICD-10-CM

## 2023-03-20 DIAGNOSIS — N18.31 ANEMIA DUE TO STAGE 3A CHRONIC KIDNEY DISEASE: ICD-10-CM

## 2023-03-20 DIAGNOSIS — E78.00 HIGH CHOLESTEROL: Chronic | ICD-10-CM

## 2023-03-20 LAB
ALBUMIN SERPL BCP-MCNC: 4.3 G/DL (ref 3.5–5.2)
ALP SERPL-CCNC: 91 U/L (ref 55–135)
ALT SERPL W/O P-5'-P-CCNC: 16 U/L (ref 10–44)
ANION GAP SERPL CALC-SCNC: 11 MMOL/L (ref 8–16)
AST SERPL-CCNC: 18 U/L (ref 10–40)
BASOPHILS # BLD AUTO: 0.02 K/UL (ref 0–0.2)
BASOPHILS NFR BLD: 0.4 % (ref 0–1.9)
BILIRUB SERPL-MCNC: 0.4 MG/DL (ref 0.1–1)
BUN SERPL-MCNC: 29 MG/DL (ref 8–23)
CALCIUM SERPL-MCNC: 10.1 MG/DL (ref 8.7–10.5)
CHLORIDE SERPL-SCNC: 105 MMOL/L (ref 95–110)
CHOLEST SERPL-MCNC: 161 MG/DL (ref 120–199)
CHOLEST/HDLC SERPL: 2.8 {RATIO} (ref 2–5)
CO2 SERPL-SCNC: 24 MMOL/L (ref 23–29)
CREAT SERPL-MCNC: 1 MG/DL (ref 0.5–1.4)
DIFFERENTIAL METHOD: NORMAL
EOSINOPHIL # BLD AUTO: 0.1 K/UL (ref 0–0.5)
EOSINOPHIL NFR BLD: 2.2 % (ref 0–8)
ERYTHROCYTE [DISTWIDTH] IN BLOOD BY AUTOMATED COUNT: 13.3 % (ref 11.5–14.5)
EST. GFR  (NO RACE VARIABLE): 55 ML/MIN/1.73 M^2
ESTIMATED AVG GLUCOSE: 108 MG/DL (ref 68–131)
FERRITIN SERPL-MCNC: 228 NG/ML (ref 20–300)
GLUCOSE SERPL-MCNC: 92 MG/DL (ref 70–110)
HBA1C MFR BLD: 5.4 % (ref 4–5.6)
HCT VFR BLD AUTO: 38.7 % (ref 37–48.5)
HDLC SERPL-MCNC: 58 MG/DL (ref 40–75)
HDLC SERPL: 36 % (ref 20–50)
HGB BLD-MCNC: 12.5 G/DL (ref 12–16)
IMM GRANULOCYTES # BLD AUTO: 0 K/UL (ref 0–0.04)
IMM GRANULOCYTES NFR BLD AUTO: 0 % (ref 0–0.5)
IRON SERPL-MCNC: 72 UG/DL (ref 30–160)
LDLC SERPL CALC-MCNC: 92.2 MG/DL (ref 63–159)
LYMPHOCYTES # BLD AUTO: 1.7 K/UL (ref 1–4.8)
LYMPHOCYTES NFR BLD: 37.7 % (ref 18–48)
MCH RBC QN AUTO: 29.8 PG (ref 27–31)
MCHC RBC AUTO-ENTMCNC: 32.3 G/DL (ref 32–36)
MCV RBC AUTO: 92 FL (ref 82–98)
MONOCYTES # BLD AUTO: 0.4 K/UL (ref 0.3–1)
MONOCYTES NFR BLD: 8.5 % (ref 4–15)
NEUTROPHILS # BLD AUTO: 2.4 K/UL (ref 1.8–7.7)
NEUTROPHILS NFR BLD: 51.2 % (ref 38–73)
NONHDLC SERPL-MCNC: 103 MG/DL
NRBC BLD-RTO: 0 /100 WBC
PLATELET # BLD AUTO: 210 K/UL (ref 150–450)
PMV BLD AUTO: 12.1 FL (ref 9.2–12.9)
POTASSIUM SERPL-SCNC: 4.1 MMOL/L (ref 3.5–5.1)
PROT SERPL-MCNC: 7.4 G/DL (ref 6–8.4)
RBC # BLD AUTO: 4.19 M/UL (ref 4–5.4)
SATURATED IRON: 23 % (ref 20–50)
SODIUM SERPL-SCNC: 140 MMOL/L (ref 136–145)
TOTAL IRON BINDING CAPACITY: 317 UG/DL (ref 250–450)
TRANSFERRIN SERPL-MCNC: 214 MG/DL (ref 200–375)
TRIGL SERPL-MCNC: 54 MG/DL (ref 30–150)
VIT B12 SERPL-MCNC: 337 PG/ML (ref 210–950)
WBC # BLD AUTO: 4.61 K/UL (ref 3.9–12.7)

## 2023-03-20 PROCEDURE — 82728 ASSAY OF FERRITIN: CPT | Performed by: FAMILY MEDICINE

## 2023-03-20 PROCEDURE — 82746 ASSAY OF FOLIC ACID SERUM: CPT | Performed by: FAMILY MEDICINE

## 2023-03-20 PROCEDURE — 83036 HEMOGLOBIN GLYCOSYLATED A1C: CPT | Performed by: FAMILY MEDICINE

## 2023-03-20 PROCEDURE — 85025 COMPLETE CBC W/AUTO DIFF WBC: CPT | Performed by: FAMILY MEDICINE

## 2023-03-20 PROCEDURE — 80053 COMPREHEN METABOLIC PANEL: CPT | Performed by: FAMILY MEDICINE

## 2023-03-20 PROCEDURE — 80061 LIPID PANEL: CPT | Performed by: FAMILY MEDICINE

## 2023-03-20 PROCEDURE — 82607 VITAMIN B-12: CPT | Performed by: FAMILY MEDICINE

## 2023-03-20 PROCEDURE — 36415 COLL VENOUS BLD VENIPUNCTURE: CPT | Performed by: FAMILY MEDICINE

## 2023-03-20 PROCEDURE — 84466 ASSAY OF TRANSFERRIN: CPT | Performed by: FAMILY MEDICINE

## 2023-03-21 LAB — FOLATE SERPL-MCNC: 8.9 NG/ML (ref 4–24)

## 2023-09-06 ENCOUNTER — LAB VISIT (OUTPATIENT)
Dept: LAB | Facility: HOSPITAL | Age: 88
End: 2023-09-06
Payer: MEDICARE

## 2023-09-06 DIAGNOSIS — E55.9 VITAMIN D DEFICIENCY: ICD-10-CM

## 2023-09-06 DIAGNOSIS — D63.1 ANEMIA IN STAGE 3A CHRONIC KIDNEY DISEASE: ICD-10-CM

## 2023-09-06 DIAGNOSIS — N25.81 SECONDARY HYPERPARATHYROIDISM OF RENAL ORIGIN: ICD-10-CM

## 2023-09-06 DIAGNOSIS — N18.31 STAGE 3A CHRONIC KIDNEY DISEASE: ICD-10-CM

## 2023-09-06 DIAGNOSIS — E79.0 HYPERURICEMIA: ICD-10-CM

## 2023-09-06 DIAGNOSIS — N18.31 ANEMIA IN STAGE 3A CHRONIC KIDNEY DISEASE: ICD-10-CM

## 2023-09-06 LAB
25(OH)D3+25(OH)D2 SERPL-MCNC: 52 NG/ML (ref 30–96)
ALBUMIN SERPL BCP-MCNC: 3.9 G/DL (ref 3.5–5.2)
ALBUMIN/CREAT UR: 12.2 UG/MG (ref 0–30)
ANION GAP SERPL CALC-SCNC: 10 MMOL/L (ref 8–16)
BASOPHILS # BLD AUTO: 0.02 K/UL (ref 0–0.2)
BASOPHILS NFR BLD: 0.4 % (ref 0–1.9)
BILIRUB UR QL STRIP: NEGATIVE
BUN SERPL-MCNC: 31 MG/DL (ref 8–23)
CALCIUM SERPL-MCNC: 9.8 MG/DL (ref 8.7–10.5)
CHLORIDE SERPL-SCNC: 102 MMOL/L (ref 95–110)
CLARITY UR: ABNORMAL
CO2 SERPL-SCNC: 22 MMOL/L (ref 23–29)
COLOR UR: YELLOW
CREAT SERPL-MCNC: 1.1 MG/DL (ref 0.5–1.4)
CREAT UR-MCNC: 139 MG/DL (ref 15–325)
DIFFERENTIAL METHOD: ABNORMAL
EOSINOPHIL # BLD AUTO: 0.2 K/UL (ref 0–0.5)
EOSINOPHIL NFR BLD: 3.3 % (ref 0–8)
ERYTHROCYTE [DISTWIDTH] IN BLOOD BY AUTOMATED COUNT: 13.3 % (ref 11.5–14.5)
EST. GFR  (NO RACE VARIABLE): 49 ML/MIN/1.73 M^2
FERRITIN SERPL-MCNC: 169 NG/ML (ref 20–300)
GLUCOSE SERPL-MCNC: 81 MG/DL (ref 70–110)
GLUCOSE UR QL STRIP: NEGATIVE
HCT VFR BLD AUTO: 34.6 % (ref 37–48.5)
HGB BLD-MCNC: 11.2 G/DL (ref 12–16)
HGB UR QL STRIP: NEGATIVE
IMM GRANULOCYTES # BLD AUTO: 0.01 K/UL (ref 0–0.04)
IMM GRANULOCYTES NFR BLD AUTO: 0.2 % (ref 0–0.5)
IRON SERPL-MCNC: 66 UG/DL (ref 30–160)
KETONES UR QL STRIP: NEGATIVE
LEUKOCYTE ESTERASE UR QL STRIP: ABNORMAL
LYMPHOCYTES # BLD AUTO: 1.5 K/UL (ref 1–4.8)
LYMPHOCYTES NFR BLD: 28.2 % (ref 18–48)
MCH RBC QN AUTO: 29.3 PG (ref 27–31)
MCHC RBC AUTO-ENTMCNC: 32.4 G/DL (ref 32–36)
MCV RBC AUTO: 91 FL (ref 82–98)
MICROALBUMIN UR DL<=1MG/L-MCNC: 17 UG/ML
MICROSCOPIC COMMENT: NORMAL
MONOCYTES # BLD AUTO: 0.6 K/UL (ref 0.3–1)
MONOCYTES NFR BLD: 12.3 % (ref 4–15)
NEUTROPHILS # BLD AUTO: 2.9 K/UL (ref 1.8–7.7)
NEUTROPHILS NFR BLD: 55.6 % (ref 38–73)
NITRITE UR QL STRIP: NEGATIVE
NRBC BLD-RTO: 0 /100 WBC
PH UR STRIP: 5 [PH] (ref 5–8)
PHOSPHATE SERPL-MCNC: 3.2 MG/DL (ref 2.7–4.5)
PLATELET # BLD AUTO: 225 K/UL (ref 150–450)
PMV BLD AUTO: 11.7 FL (ref 9.2–12.9)
POTASSIUM SERPL-SCNC: 3.7 MMOL/L (ref 3.5–5.1)
PROT UR QL STRIP: NEGATIVE
PTH-INTACT SERPL-MCNC: 75.8 PG/ML (ref 9–77)
RBC # BLD AUTO: 3.82 M/UL (ref 4–5.4)
SATURATED IRON: 23 % (ref 20–50)
SODIUM SERPL-SCNC: 134 MMOL/L (ref 136–145)
SP GR UR STRIP: 1.02 (ref 1–1.03)
TOTAL IRON BINDING CAPACITY: 293 UG/DL (ref 250–450)
TRANSFERRIN SERPL-MCNC: 198 MG/DL (ref 200–375)
URATE SERPL-MCNC: 6.2 MG/DL (ref 2.4–5.7)
URN SPEC COLLECT METH UR: ABNORMAL
UROBILINOGEN UR STRIP-ACNC: NEGATIVE EU/DL
WBC # BLD AUTO: 5.22 K/UL (ref 3.9–12.7)
WBC #/AREA URNS HPF: 0 /HPF (ref 0–5)

## 2023-09-06 PROCEDURE — 82728 ASSAY OF FERRITIN: CPT | Performed by: INTERNAL MEDICINE

## 2023-09-06 PROCEDURE — 80069 RENAL FUNCTION PANEL: CPT | Performed by: INTERNAL MEDICINE

## 2023-09-06 PROCEDURE — 82306 VITAMIN D 25 HYDROXY: CPT | Performed by: INTERNAL MEDICINE

## 2023-09-06 PROCEDURE — 82043 UR ALBUMIN QUANTITATIVE: CPT | Performed by: INTERNAL MEDICINE

## 2023-09-06 PROCEDURE — 84550 ASSAY OF BLOOD/URIC ACID: CPT | Performed by: INTERNAL MEDICINE

## 2023-09-06 PROCEDURE — 85025 COMPLETE CBC W/AUTO DIFF WBC: CPT | Performed by: INTERNAL MEDICINE

## 2023-09-06 PROCEDURE — 81000 URINALYSIS NONAUTO W/SCOPE: CPT | Performed by: INTERNAL MEDICINE

## 2023-09-06 PROCEDURE — 36415 COLL VENOUS BLD VENIPUNCTURE: CPT | Performed by: INTERNAL MEDICINE

## 2023-09-06 PROCEDURE — 83970 ASSAY OF PARATHORMONE: CPT | Performed by: INTERNAL MEDICINE

## 2023-09-06 PROCEDURE — 84466 ASSAY OF TRANSFERRIN: CPT | Performed by: INTERNAL MEDICINE

## 2023-09-06 PROCEDURE — 83540 ASSAY OF IRON: CPT | Performed by: INTERNAL MEDICINE

## 2023-10-19 ENCOUNTER — OFFICE VISIT (OUTPATIENT)
Dept: URGENT CARE | Facility: CLINIC | Age: 88
End: 2023-10-19
Payer: MEDICARE

## 2023-10-19 VITALS
DIASTOLIC BLOOD PRESSURE: 89 MMHG | RESPIRATION RATE: 20 BRPM | HEIGHT: 63 IN | SYSTOLIC BLOOD PRESSURE: 133 MMHG | BODY MASS INDEX: 26.57 KG/M2 | TEMPERATURE: 98 F | HEART RATE: 63 BPM | WEIGHT: 149.94 LBS | OXYGEN SATURATION: 96 %

## 2023-10-19 DIAGNOSIS — R05.9 COUGH, UNSPECIFIED TYPE: Primary | ICD-10-CM

## 2023-10-19 DIAGNOSIS — J06.9 URI, ACUTE: ICD-10-CM

## 2023-10-19 LAB
CTP QC/QA: YES
SARS-COV-2 AG RESP QL IA.RAPID: NEGATIVE

## 2023-10-19 PROCEDURE — 87811 SARS-COV-2 COVID19 W/OPTIC: CPT | Mod: QW,S$GLB,, | Performed by: FAMILY MEDICINE

## 2023-10-19 PROCEDURE — 99203 OFFICE O/P NEW LOW 30 MIN: CPT | Mod: S$GLB,,, | Performed by: FAMILY MEDICINE

## 2023-10-19 PROCEDURE — 87811 SARS CORONAVIRUS 2 ANTIGEN POCT, MANUAL READ: ICD-10-PCS | Mod: QW,S$GLB,, | Performed by: FAMILY MEDICINE

## 2023-10-19 PROCEDURE — 99203 PR OFFICE/OUTPT VISIT, NEW, LEVL III, 30-44 MIN: ICD-10-PCS | Mod: S$GLB,,, | Performed by: FAMILY MEDICINE

## 2023-10-19 RX ORDER — PROMETHAZINE HYDROCHLORIDE AND DEXTROMETHORPHAN HYDROBROMIDE 6.25; 15 MG/5ML; MG/5ML
SYRUP ORAL
Qty: 120 ML | Refills: 0 | Status: SHIPPED | OUTPATIENT
Start: 2023-10-19

## 2023-10-19 RX ORDER — LORATADINE 10 MG/1
10 TABLET ORAL DAILY
Qty: 30 TABLET | Refills: 2 | Status: SHIPPED | OUTPATIENT
Start: 2023-10-19 | End: 2024-10-18

## 2023-10-19 NOTE — PROGRESS NOTES
"Subjective:      Patient ID: Taiwo Boyd is a 88 y.o. female.    Vitals:  height is 5' 3" (1.6 m) and weight is 68 kg (149 lb 14.6 oz). Her oral temperature is 98.3 °F (36.8 °C). Her blood pressure is 133/89 and her pulse is 63. Her respiration is 20 and oxygen saturation is 96%.     Chief Complaint: Cough    Pt present to clinic with cough. Started 1 week ago. Treatment include robotism with no relief.     Denies fever, chills or body ache      Cough  This is a new problem. The current episode started 1 to 4 weeks ago. The problem has been gradually worsening. The problem occurs constantly. The cough is Productive of sputum. Pertinent negatives include no sore throat or wheezing. The symptoms are aggravated by lying down. Treatments tried: robotism. The treatment provided no relief. Her past medical history is significant for asthma.       HENT:  Negative for sore throat.    Respiratory:  Positive for cough. Negative for wheezing.       Objective:     Physical Exam   Constitutional: She is oriented to person, place, and time. She appears well-developed. She is cooperative.  Non-toxic appearance. She does not appear ill. No distress.   HENT:   Head: Normocephalic and atraumatic.   Ears:   Right Ear: Hearing, tympanic membrane, external ear and ear canal normal.   Left Ear: Hearing, tympanic membrane, external ear and ear canal normal.   Nose: Nose normal. No mucosal edema, rhinorrhea or nasal deformity. No epistaxis. Right sinus exhibits no maxillary sinus tenderness and no frontal sinus tenderness. Left sinus exhibits no maxillary sinus tenderness and no frontal sinus tenderness.   Mouth/Throat: Uvula is midline, oropharynx is clear and moist and mucous membranes are normal. Mucous membranes are moist. No trismus in the jaw. Normal dentition. No uvula swelling. No posterior oropharyngeal erythema. Oropharynx is clear.   Eyes: Conjunctivae and lids are normal. Pupils are equal, round, and reactive to light. Right " eye exhibits no discharge. Left eye exhibits no discharge. No scleral icterus. Extraocular movement intact   Neck: Trachea normal and phonation normal. Neck supple.   Cardiovascular: Normal rate, regular rhythm, normal heart sounds and normal pulses.   Pulmonary/Chest: Effort normal and breath sounds normal. No respiratory distress.   Abdominal: Normal appearance and bowel sounds are normal. She exhibits no distension and no mass. Soft. There is no abdominal tenderness.   Musculoskeletal: Normal range of motion.         General: No deformity. Normal range of motion.   Neurological: She is alert and oriented to person, place, and time. She exhibits normal muscle tone. Coordination normal.   Skin: Skin is warm, dry, intact, not diaphoretic and not pale.   Psychiatric: Her speech is normal and behavior is normal. Judgment and thought content normal.   Nursing note and vitals reviewed.      Assessment:     1. Cough, unspecified type    2. URI, acute        Plan:       Cough, unspecified type  -     SARS Coronavirus 2 Antigen, POCT Manual Read    URI, acute    Other orders  -     loratadine (CLARITIN) 10 mg tablet; Take 1 tablet (10 mg total) by mouth once daily.  Dispense: 30 tablet; Refill: 2  -     promethazine-dextromethorphan (PROMETHAZINE-DM) 6.25-15 mg/5 mL Syrp; Take one tsp po q 6 hrs prn cough  Dispense: 120 mL; Refill: 0            Results for orders placed or performed in visit on 10/19/23   SARS Coronavirus 2 Antigen, POCT Manual Read   Result Value Ref Range    SARS Coronavirus 2 Antigen Negative Negative     Acceptable Yes

## 2024-04-01 ENCOUNTER — LAB VISIT (OUTPATIENT)
Dept: LAB | Facility: HOSPITAL | Age: 89
End: 2024-04-01
Attending: FAMILY MEDICINE

## 2024-04-01 DIAGNOSIS — D69.2 NONTHROMBOCYTOPENIC PURPURA: ICD-10-CM

## 2024-04-01 DIAGNOSIS — N18.31 STAGE 3A CHRONIC KIDNEY DISEASE: Chronic | ICD-10-CM

## 2024-04-01 DIAGNOSIS — E78.00 HIGH CHOLESTEROL: Chronic | ICD-10-CM

## 2024-04-01 DIAGNOSIS — N25.81 SECONDARY HYPERPARATHYROIDISM OF RENAL ORIGIN: ICD-10-CM

## 2024-04-01 DIAGNOSIS — Z13.1 SCREENING FOR DIABETES MELLITUS: ICD-10-CM

## 2024-04-01 LAB
ALBUMIN SERPL BCP-MCNC: 3.8 G/DL (ref 3.5–5.2)
ALP SERPL-CCNC: 80 U/L (ref 55–135)
ALT SERPL W/O P-5'-P-CCNC: 20 U/L (ref 10–44)
ANION GAP SERPL CALC-SCNC: 8 MMOL/L (ref 8–16)
AST SERPL-CCNC: 19 U/L (ref 10–40)
BASOPHILS # BLD AUTO: 0.04 K/UL (ref 0–0.2)
BASOPHILS NFR BLD: 0.9 % (ref 0–1.9)
BILIRUB SERPL-MCNC: 0.5 MG/DL (ref 0.1–1)
BUN SERPL-MCNC: 39 MG/DL (ref 8–23)
CALCIUM SERPL-MCNC: 9.5 MG/DL (ref 8.7–10.5)
CHLORIDE SERPL-SCNC: 100 MMOL/L (ref 95–110)
CHOLEST SERPL-MCNC: 152 MG/DL (ref 120–199)
CHOLEST/HDLC SERPL: 2.6 {RATIO} (ref 2–5)
CO2 SERPL-SCNC: 25 MMOL/L (ref 23–29)
CREAT SERPL-MCNC: 0.9 MG/DL (ref 0.5–1.4)
DIFFERENTIAL METHOD BLD: ABNORMAL
EOSINOPHIL # BLD AUTO: 0.3 K/UL (ref 0–0.5)
EOSINOPHIL NFR BLD: 5.4 % (ref 0–8)
ERYTHROCYTE [DISTWIDTH] IN BLOOD BY AUTOMATED COUNT: 13.4 % (ref 11.5–14.5)
EST. GFR  (NO RACE VARIABLE): >60 ML/MIN/1.73 M^2
ESTIMATED AVG GLUCOSE: 108 MG/DL (ref 68–131)
GLUCOSE SERPL-MCNC: 82 MG/DL (ref 70–110)
HBA1C MFR BLD: 5.4 % (ref 4–5.6)
HCT VFR BLD AUTO: 34.8 % (ref 37–48.5)
HDLC SERPL-MCNC: 58 MG/DL (ref 40–75)
HDLC SERPL: 38.2 % (ref 20–50)
HGB BLD-MCNC: 11.3 G/DL (ref 12–16)
IMM GRANULOCYTES # BLD AUTO: 0.01 K/UL (ref 0–0.04)
IMM GRANULOCYTES NFR BLD AUTO: 0.2 % (ref 0–0.5)
LDLC SERPL CALC-MCNC: 85.4 MG/DL (ref 63–159)
LYMPHOCYTES # BLD AUTO: 1.8 K/UL (ref 1–4.8)
LYMPHOCYTES NFR BLD: 38.1 % (ref 18–48)
MCH RBC QN AUTO: 29.9 PG (ref 27–31)
MCHC RBC AUTO-ENTMCNC: 32.5 G/DL (ref 32–36)
MCV RBC AUTO: 92 FL (ref 82–98)
MONOCYTES # BLD AUTO: 0.5 K/UL (ref 0.3–1)
MONOCYTES NFR BLD: 10.6 % (ref 4–15)
NEUTROPHILS # BLD AUTO: 2.1 K/UL (ref 1.8–7.7)
NEUTROPHILS NFR BLD: 44.8 % (ref 38–73)
NONHDLC SERPL-MCNC: 94 MG/DL
NRBC BLD-RTO: 0 /100 WBC
PLATELET # BLD AUTO: 215 K/UL (ref 150–450)
PMV BLD AUTO: 12.1 FL (ref 9.2–12.9)
POTASSIUM SERPL-SCNC: 4.1 MMOL/L (ref 3.5–5.1)
PROT SERPL-MCNC: 6.7 G/DL (ref 6–8.4)
PTH-INTACT SERPL-MCNC: 87.5 PG/ML (ref 9–77)
RBC # BLD AUTO: 3.78 M/UL (ref 4–5.4)
SODIUM SERPL-SCNC: 133 MMOL/L (ref 136–145)
TRIGL SERPL-MCNC: 43 MG/DL (ref 30–150)
WBC # BLD AUTO: 4.62 K/UL (ref 3.9–12.7)

## 2024-04-01 PROCEDURE — 85025 COMPLETE CBC W/AUTO DIFF WBC: CPT | Performed by: FAMILY MEDICINE

## 2024-04-01 PROCEDURE — 36415 COLL VENOUS BLD VENIPUNCTURE: CPT | Performed by: FAMILY MEDICINE

## 2024-04-01 PROCEDURE — 80061 LIPID PANEL: CPT | Performed by: FAMILY MEDICINE

## 2024-04-01 PROCEDURE — 83970 ASSAY OF PARATHORMONE: CPT | Performed by: FAMILY MEDICINE

## 2024-04-01 PROCEDURE — 80053 COMPREHEN METABOLIC PANEL: CPT | Performed by: FAMILY MEDICINE

## 2024-04-01 PROCEDURE — 83036 HEMOGLOBIN GLYCOSYLATED A1C: CPT | Performed by: FAMILY MEDICINE

## 2024-04-08 PROBLEM — I10 ESSENTIAL HYPERTENSION: Chronic | Status: ACTIVE | Noted: 2022-04-05

## 2024-04-10 PROBLEM — D63.1 ANEMIA DUE TO STAGE 3A CHRONIC KIDNEY DISEASE: Status: RESOLVED | Noted: 2018-04-19 | Resolved: 2024-04-10

## 2024-04-10 PROBLEM — N18.31 STAGE 3A CHRONIC KIDNEY DISEASE: Chronic | Status: RESOLVED | Noted: 2018-04-19 | Resolved: 2024-04-10

## 2024-04-10 PROBLEM — N18.31 ANEMIA DUE TO STAGE 3A CHRONIC KIDNEY DISEASE: Status: RESOLVED | Noted: 2018-04-19 | Resolved: 2024-04-10

## 2024-06-01 ENCOUNTER — LAB VISIT (OUTPATIENT)
Dept: LAB | Facility: HOSPITAL | Age: 89
End: 2024-06-01
Attending: INTERNAL MEDICINE

## 2024-06-01 DIAGNOSIS — N18.31 ANEMIA IN STAGE 3A CHRONIC KIDNEY DISEASE: ICD-10-CM

## 2024-06-01 DIAGNOSIS — E55.9 VITAMIN D DEFICIENCY: ICD-10-CM

## 2024-06-01 DIAGNOSIS — N18.31 STAGE 3A CHRONIC KIDNEY DISEASE: ICD-10-CM

## 2024-06-01 DIAGNOSIS — N25.81 SECONDARY HYPERPARATHYROIDISM OF RENAL ORIGIN: ICD-10-CM

## 2024-06-01 DIAGNOSIS — D63.1 ANEMIA IN STAGE 3A CHRONIC KIDNEY DISEASE: ICD-10-CM

## 2024-06-01 LAB
25(OH)D3+25(OH)D2 SERPL-MCNC: 35 NG/ML (ref 30–96)
ALBUMIN SERPL BCP-MCNC: 4 G/DL (ref 3.5–5.2)
ANION GAP SERPL CALC-SCNC: 9 MMOL/L (ref 8–16)
BASOPHILS # BLD AUTO: 0.03 K/UL (ref 0–0.2)
BASOPHILS NFR BLD: 0.6 % (ref 0–1.9)
BUN SERPL-MCNC: 34 MG/DL (ref 8–23)
CALCIUM SERPL-MCNC: 9.9 MG/DL (ref 8.7–10.5)
CHLORIDE SERPL-SCNC: 99 MMOL/L (ref 95–110)
CO2 SERPL-SCNC: 24 MMOL/L (ref 23–29)
CREAT SERPL-MCNC: 0.9 MG/DL (ref 0.5–1.4)
DIFFERENTIAL METHOD BLD: ABNORMAL
EOSINOPHIL # BLD AUTO: 0.2 K/UL (ref 0–0.5)
EOSINOPHIL NFR BLD: 3.6 % (ref 0–8)
ERYTHROCYTE [DISTWIDTH] IN BLOOD BY AUTOMATED COUNT: 13.4 % (ref 11.5–14.5)
EST. GFR  (NO RACE VARIABLE): >60 ML/MIN/1.73 M^2
FERRITIN SERPL-MCNC: 280 NG/ML (ref 20–300)
GLUCOSE SERPL-MCNC: 82 MG/DL (ref 70–110)
HBV SURFACE AB SER-ACNC: <3 MIU/ML
HBV SURFACE AB SER-ACNC: NORMAL M[IU]/ML
HBV SURFACE AG SERPL QL IA: NORMAL
HCT VFR BLD AUTO: 36.7 % (ref 37–48.5)
HGB BLD-MCNC: 11.9 G/DL (ref 12–16)
IMM GRANULOCYTES # BLD AUTO: 0.01 K/UL (ref 0–0.04)
IMM GRANULOCYTES NFR BLD AUTO: 0.2 % (ref 0–0.5)
IRON SERPL-MCNC: 87 UG/DL (ref 30–160)
LYMPHOCYTES # BLD AUTO: 1.6 K/UL (ref 1–4.8)
LYMPHOCYTES NFR BLD: 31 % (ref 18–48)
MAGNESIUM SERPL-MCNC: 1.7 MG/DL (ref 1.6–2.6)
MCH RBC QN AUTO: 29.2 PG (ref 27–31)
MCHC RBC AUTO-ENTMCNC: 32.4 G/DL (ref 32–36)
MCV RBC AUTO: 90 FL (ref 82–98)
MONOCYTES # BLD AUTO: 0.4 K/UL (ref 0.3–1)
MONOCYTES NFR BLD: 7.6 % (ref 4–15)
NEUTROPHILS # BLD AUTO: 3 K/UL (ref 1.8–7.7)
NEUTROPHILS NFR BLD: 57 % (ref 38–73)
NRBC BLD-RTO: 0 /100 WBC
PHOSPHATE SERPL-MCNC: 3.1 MG/DL (ref 2.7–4.5)
PLATELET # BLD AUTO: 205 K/UL (ref 150–450)
PMV BLD AUTO: 11.6 FL (ref 9.2–12.9)
POTASSIUM SERPL-SCNC: 4.1 MMOL/L (ref 3.5–5.1)
PTH-INTACT SERPL-MCNC: 70.5 PG/ML (ref 9–77)
RBC # BLD AUTO: 4.07 M/UL (ref 4–5.4)
SATURATED IRON: 29 % (ref 20–50)
SODIUM SERPL-SCNC: 132 MMOL/L (ref 136–145)
TOTAL IRON BINDING CAPACITY: 295 UG/DL (ref 250–450)
TRANSFERRIN SERPL-MCNC: 199 MG/DL (ref 200–375)
URATE SERPL-MCNC: 5.5 MG/DL (ref 2.4–5.7)
WBC # BLD AUTO: 5.29 K/UL (ref 3.9–12.7)

## 2024-06-01 PROCEDURE — 36415 COLL VENOUS BLD VENIPUNCTURE: CPT | Performed by: INTERNAL MEDICINE

## 2024-06-01 PROCEDURE — 83970 ASSAY OF PARATHORMONE: CPT | Performed by: INTERNAL MEDICINE

## 2024-06-01 PROCEDURE — 83540 ASSAY OF IRON: CPT | Performed by: INTERNAL MEDICINE

## 2024-06-01 PROCEDURE — 85025 COMPLETE CBC W/AUTO DIFF WBC: CPT | Performed by: INTERNAL MEDICINE

## 2024-06-01 PROCEDURE — 82728 ASSAY OF FERRITIN: CPT | Performed by: INTERNAL MEDICINE

## 2024-06-01 PROCEDURE — 86706 HEP B SURFACE ANTIBODY: CPT | Mod: 91 | Performed by: INTERNAL MEDICINE

## 2024-06-01 PROCEDURE — 82043 UR ALBUMIN QUANTITATIVE: CPT | Performed by: INTERNAL MEDICINE

## 2024-06-01 PROCEDURE — 82306 VITAMIN D 25 HYDROXY: CPT | Performed by: INTERNAL MEDICINE

## 2024-06-01 PROCEDURE — 81000 URINALYSIS NONAUTO W/SCOPE: CPT | Performed by: INTERNAL MEDICINE

## 2024-06-01 PROCEDURE — 83735 ASSAY OF MAGNESIUM: CPT | Performed by: INTERNAL MEDICINE

## 2024-06-01 PROCEDURE — 84550 ASSAY OF BLOOD/URIC ACID: CPT | Performed by: INTERNAL MEDICINE

## 2024-06-01 PROCEDURE — 87340 HEPATITIS B SURFACE AG IA: CPT | Performed by: INTERNAL MEDICINE

## 2024-06-01 PROCEDURE — 80069 RENAL FUNCTION PANEL: CPT | Performed by: INTERNAL MEDICINE

## 2024-06-03 LAB
ALBUMIN/CREAT UR: 21.3 UG/MG (ref 0–30)
BACTERIA #/AREA URNS HPF: ABNORMAL /HPF
BILIRUB UR QL STRIP: NEGATIVE
CLARITY UR: ABNORMAL
COLOR UR: YELLOW
CREAT UR-MCNC: 61 MG/DL (ref 15–325)
GLUCOSE UR QL STRIP: NEGATIVE
HGB UR QL STRIP: NEGATIVE
HYALINE CASTS #/AREA URNS LPF: 2 /LPF
KETONES UR QL STRIP: NEGATIVE
LEUKOCYTE ESTERASE UR QL STRIP: NEGATIVE
MICROALBUMIN UR DL<=1MG/L-MCNC: 13 UG/ML
MICROSCOPIC COMMENT: ABNORMAL
NITRITE UR QL STRIP: POSITIVE
PH UR STRIP: 7 [PH] (ref 5–8)
PROT UR QL STRIP: NEGATIVE
SP GR UR STRIP: 1.01 (ref 1–1.03)
SQUAMOUS #/AREA URNS HPF: 9 /HPF
URN SPEC COLLECT METH UR: ABNORMAL
UROBILINOGEN UR STRIP-ACNC: NEGATIVE EU/DL
WBC #/AREA URNS HPF: 0 /HPF (ref 0–5)

## 2024-12-28 ENCOUNTER — HOSPITAL ENCOUNTER (INPATIENT)
Facility: HOSPITAL | Age: 89
LOS: 5 days | Discharge: NURSING, REHAB OR LONG-TERM CARE | DRG: 536 | End: 2025-01-03
Attending: EMERGENCY MEDICINE | Admitting: INTERNAL MEDICINE
Payer: MEDICARE

## 2024-12-28 DIAGNOSIS — I10 PRIMARY HYPERTENSION: ICD-10-CM

## 2024-12-28 DIAGNOSIS — S32.592A CLOSED FRACTURE OF RAMUS OF LEFT PUBIS, INITIAL ENCOUNTER: ICD-10-CM

## 2024-12-28 DIAGNOSIS — S32.592A PUBIC RAMUS FRACTURE, LEFT, CLOSED, INITIAL ENCOUNTER: Primary | ICD-10-CM

## 2024-12-28 DIAGNOSIS — D64.9 NORMOCYTIC ANEMIA: ICD-10-CM

## 2024-12-28 DIAGNOSIS — R00.1 BRADYCARDIA: ICD-10-CM

## 2024-12-28 PROCEDURE — 99285 EMERGENCY DEPT VISIT HI MDM: CPT | Mod: 25

## 2024-12-28 NOTE — Clinical Note
Diagnosis: Pubic ramus fracture, left, closed, initial encounter [[id number]]   Future Attending Provider: JONE RIGGS [[id number]]

## 2024-12-29 ENCOUNTER — E-CONSULT (OUTPATIENT)
Dept: SPORTS MEDICINE | Facility: CLINIC | Age: 89
End: 2024-12-29
Payer: MEDICARE

## 2024-12-29 DIAGNOSIS — M25.552 LEFT HIP PAIN: Primary | ICD-10-CM

## 2024-12-29 PROBLEM — S32.592A: Status: ACTIVE | Noted: 2024-12-29

## 2024-12-29 LAB
ALBUMIN SERPL BCP-MCNC: 3.8 G/DL (ref 3.5–5.2)
ALP SERPL-CCNC: 73 U/L (ref 40–150)
ALT SERPL W/O P-5'-P-CCNC: 22 U/L (ref 10–44)
ANION GAP SERPL CALC-SCNC: 11 MMOL/L (ref 8–16)
ANION GAP SERPL CALC-SCNC: 9 MMOL/L (ref 8–16)
AST SERPL-CCNC: 21 U/L (ref 10–40)
BASOPHILS # BLD AUTO: 0.02 K/UL (ref 0–0.2)
BASOPHILS # BLD AUTO: 0.04 K/UL (ref 0–0.2)
BASOPHILS NFR BLD: 0.3 % (ref 0–1.9)
BASOPHILS NFR BLD: 0.5 % (ref 0–1.9)
BILIRUB SERPL-MCNC: 0.5 MG/DL (ref 0.1–1)
BILIRUB UR QL STRIP: NEGATIVE
BUN SERPL-MCNC: 43 MG/DL (ref 8–23)
BUN SERPL-MCNC: 45 MG/DL (ref 8–23)
CALCIUM SERPL-MCNC: 9.2 MG/DL (ref 8.7–10.5)
CALCIUM SERPL-MCNC: 9.6 MG/DL (ref 8.7–10.5)
CHLORIDE SERPL-SCNC: 108 MMOL/L (ref 95–110)
CHLORIDE SERPL-SCNC: 108 MMOL/L (ref 95–110)
CLARITY UR: CLEAR
CO2 SERPL-SCNC: 21 MMOL/L (ref 23–29)
CO2 SERPL-SCNC: 21 MMOL/L (ref 23–29)
COLOR UR: YELLOW
CREAT SERPL-MCNC: 0.8 MG/DL (ref 0.5–1.4)
CREAT SERPL-MCNC: 0.9 MG/DL (ref 0.5–1.4)
DIFFERENTIAL METHOD BLD: ABNORMAL
DIFFERENTIAL METHOD BLD: ABNORMAL
EOSINOPHIL # BLD AUTO: 0.1 K/UL (ref 0–0.5)
EOSINOPHIL # BLD AUTO: 0.1 K/UL (ref 0–0.5)
EOSINOPHIL NFR BLD: 0.9 % (ref 0–8)
EOSINOPHIL NFR BLD: 1 % (ref 0–8)
ERYTHROCYTE [DISTWIDTH] IN BLOOD BY AUTOMATED COUNT: 13.2 % (ref 11.5–14.5)
ERYTHROCYTE [DISTWIDTH] IN BLOOD BY AUTOMATED COUNT: 13.4 % (ref 11.5–14.5)
EST. GFR  (NO RACE VARIABLE): >60 ML/MIN/1.73 M^2
EST. GFR  (NO RACE VARIABLE): >60 ML/MIN/1.73 M^2
GLUCOSE SERPL-MCNC: 90 MG/DL (ref 70–110)
GLUCOSE SERPL-MCNC: 91 MG/DL (ref 70–110)
GLUCOSE UR QL STRIP: NEGATIVE
HCT VFR BLD AUTO: 30 % (ref 37–48.5)
HCT VFR BLD AUTO: 30.9 % (ref 37–48.5)
HGB BLD-MCNC: 10 G/DL (ref 12–16)
HGB BLD-MCNC: 10.1 G/DL (ref 12–16)
HGB UR QL STRIP: NEGATIVE
IMM GRANULOCYTES # BLD AUTO: 0.02 K/UL (ref 0–0.04)
IMM GRANULOCYTES # BLD AUTO: 0.07 K/UL (ref 0–0.04)
IMM GRANULOCYTES NFR BLD AUTO: 0.3 % (ref 0–0.5)
IMM GRANULOCYTES NFR BLD AUTO: 0.9 % (ref 0–0.5)
KETONES UR QL STRIP: ABNORMAL
LEUKOCYTE ESTERASE UR QL STRIP: NEGATIVE
LYMPHOCYTES # BLD AUTO: 1.1 K/UL (ref 1–4.8)
LYMPHOCYTES # BLD AUTO: 1.3 K/UL (ref 1–4.8)
LYMPHOCYTES NFR BLD: 15.7 % (ref 18–48)
LYMPHOCYTES NFR BLD: 16.2 % (ref 18–48)
MCH RBC QN AUTO: 29.9 PG (ref 27–31)
MCH RBC QN AUTO: 30.1 PG (ref 27–31)
MCHC RBC AUTO-ENTMCNC: 32.7 G/DL (ref 32–36)
MCHC RBC AUTO-ENTMCNC: 33.3 G/DL (ref 32–36)
MCV RBC AUTO: 90 FL (ref 82–98)
MCV RBC AUTO: 92 FL (ref 82–98)
MONOCYTES # BLD AUTO: 0.6 K/UL (ref 0.3–1)
MONOCYTES # BLD AUTO: 0.8 K/UL (ref 0.3–1)
MONOCYTES NFR BLD: 9.2 % (ref 4–15)
MONOCYTES NFR BLD: 9.9 % (ref 4–15)
NEUTROPHILS # BLD AUTO: 5.1 K/UL (ref 1.8–7.7)
NEUTROPHILS # BLD AUTO: 5.5 K/UL (ref 1.8–7.7)
NEUTROPHILS NFR BLD: 71.6 % (ref 38–73)
NEUTROPHILS NFR BLD: 73.5 % (ref 38–73)
NITRITE UR QL STRIP: NEGATIVE
NRBC BLD-RTO: 0 /100 WBC
NRBC BLD-RTO: 0 /100 WBC
PH UR STRIP: 5 [PH] (ref 5–8)
PLATELET # BLD AUTO: 168 K/UL (ref 150–450)
PLATELET # BLD AUTO: 175 K/UL (ref 150–450)
PMV BLD AUTO: 11 FL (ref 9.2–12.9)
PMV BLD AUTO: 11.6 FL (ref 9.2–12.9)
POTASSIUM SERPL-SCNC: 3.9 MMOL/L (ref 3.5–5.1)
POTASSIUM SERPL-SCNC: 4.4 MMOL/L (ref 3.5–5.1)
PROT SERPL-MCNC: 6.6 G/DL (ref 6–8.4)
PROT UR QL STRIP: NEGATIVE
RBC # BLD AUTO: 3.34 M/UL (ref 4–5.4)
RBC # BLD AUTO: 3.36 M/UL (ref 4–5.4)
SODIUM SERPL-SCNC: 138 MMOL/L (ref 136–145)
SODIUM SERPL-SCNC: 140 MMOL/L (ref 136–145)
SP GR UR STRIP: 1.02 (ref 1–1.03)
URN SPEC COLLECT METH UR: ABNORMAL
UROBILINOGEN UR STRIP-ACNC: NEGATIVE EU/DL
WBC # BLD AUTO: 6.93 K/UL (ref 3.9–12.7)
WBC # BLD AUTO: 7.71 K/UL (ref 3.9–12.7)

## 2024-12-29 PROCEDURE — 93005 ELECTROCARDIOGRAM TRACING: CPT

## 2024-12-29 PROCEDURE — 97161 PT EVAL LOW COMPLEX 20 MIN: CPT

## 2024-12-29 PROCEDURE — 85025 COMPLETE CBC W/AUTO DIFF WBC: CPT | Mod: 91

## 2024-12-29 PROCEDURE — 93010 ELECTROCARDIOGRAM REPORT: CPT | Mod: ,,, | Performed by: STUDENT IN AN ORGANIZED HEALTH CARE EDUCATION/TRAINING PROGRAM

## 2024-12-29 PROCEDURE — 80053 COMPREHEN METABOLIC PANEL: CPT | Performed by: EMERGENCY MEDICINE

## 2024-12-29 PROCEDURE — 11000001 HC ACUTE MED/SURG PRIVATE ROOM

## 2024-12-29 PROCEDURE — 97165 OT EVAL LOW COMPLEX 30 MIN: CPT

## 2024-12-29 PROCEDURE — 80048 BASIC METABOLIC PNL TOTAL CA: CPT | Mod: XB

## 2024-12-29 PROCEDURE — 25000003 PHARM REV CODE 250

## 2024-12-29 PROCEDURE — 81003 URINALYSIS AUTO W/O SCOPE: CPT | Performed by: EMERGENCY MEDICINE

## 2024-12-29 PROCEDURE — 85025 COMPLETE CBC W/AUTO DIFF WBC: CPT | Performed by: INTERNAL MEDICINE

## 2024-12-29 PROCEDURE — 97530 THERAPEUTIC ACTIVITIES: CPT

## 2024-12-29 PROCEDURE — 99447 NTRPROF PH1/NTRNET/EHR 11-20: CPT | Mod: S$GLB,,, | Performed by: ORTHOPAEDIC SURGERY

## 2024-12-29 PROCEDURE — 36415 COLL VENOUS BLD VENIPUNCTURE: CPT

## 2024-12-29 PROCEDURE — 63600175 PHARM REV CODE 636 W HCPCS

## 2024-12-29 RX ORDER — GLUCAGON 1 MG
1 KIT INJECTION
Status: DISCONTINUED | OUTPATIENT
Start: 2024-12-29 | End: 2025-01-03 | Stop reason: HOSPADM

## 2024-12-29 RX ORDER — NALOXONE HCL 0.4 MG/ML
0.02 VIAL (ML) INJECTION
Status: DISCONTINUED | OUTPATIENT
Start: 2024-12-29 | End: 2025-01-03 | Stop reason: HOSPADM

## 2024-12-29 RX ORDER — HYDROCHLOROTHIAZIDE 12.5 MG/1
12.5 TABLET ORAL EVERY MORNING
Status: DISCONTINUED | OUTPATIENT
Start: 2024-12-29 | End: 2024-12-31

## 2024-12-29 RX ORDER — CARVEDILOL 3.12 MG/1
3.12 TABLET ORAL 2 TIMES DAILY
Status: DISCONTINUED | OUTPATIENT
Start: 2024-12-29 | End: 2024-12-30

## 2024-12-29 RX ORDER — POLYETHYLENE GLYCOL 3350 17 G/17G
17 POWDER, FOR SOLUTION ORAL DAILY
Status: DISCONTINUED | OUTPATIENT
Start: 2024-12-30 | End: 2025-01-03 | Stop reason: HOSPADM

## 2024-12-29 RX ORDER — LOSARTAN POTASSIUM 50 MG/1
100 TABLET ORAL DAILY
Status: DISCONTINUED | OUTPATIENT
Start: 2024-12-29 | End: 2025-01-03 | Stop reason: HOSPADM

## 2024-12-29 RX ORDER — LANOLIN ALCOHOL/MO/W.PET/CERES
1 CREAM (GRAM) TOPICAL DAILY
Status: DISCONTINUED | OUTPATIENT
Start: 2024-12-29 | End: 2024-12-29

## 2024-12-29 RX ORDER — ATORVASTATIN CALCIUM 40 MG/1
40 TABLET, FILM COATED ORAL NIGHTLY
Status: DISCONTINUED | OUTPATIENT
Start: 2024-12-29 | End: 2025-01-03 | Stop reason: HOSPADM

## 2024-12-29 RX ORDER — ACETAMINOPHEN 500 MG
1000 TABLET ORAL
Status: DISCONTINUED | OUTPATIENT
Start: 2024-12-29 | End: 2024-12-29

## 2024-12-29 RX ORDER — ACETAMINOPHEN 500 MG
500 TABLET ORAL EVERY 4 HOURS PRN
Status: DISCONTINUED | OUTPATIENT
Start: 2024-12-29 | End: 2024-12-30

## 2024-12-29 RX ORDER — CARVEDILOL 3.12 MG/1
3.12 TABLET ORAL 2 TIMES DAILY
Status: DISCONTINUED | OUTPATIENT
Start: 2024-12-29 | End: 2024-12-29

## 2024-12-29 RX ORDER — IBUPROFEN 200 MG
16 TABLET ORAL
Status: DISCONTINUED | OUTPATIENT
Start: 2024-12-29 | End: 2025-01-03 | Stop reason: HOSPADM

## 2024-12-29 RX ORDER — SODIUM CHLORIDE 0.9 % (FLUSH) 0.9 %
10 SYRINGE (ML) INJECTION EVERY 12 HOURS PRN
Status: DISCONTINUED | OUTPATIENT
Start: 2024-12-29 | End: 2025-01-03 | Stop reason: HOSPADM

## 2024-12-29 RX ORDER — ENOXAPARIN SODIUM 100 MG/ML
40 INJECTION SUBCUTANEOUS EVERY 24 HOURS
Status: DISCONTINUED | OUTPATIENT
Start: 2024-12-29 | End: 2025-01-03 | Stop reason: HOSPADM

## 2024-12-29 RX ORDER — IBUPROFEN 200 MG
24 TABLET ORAL
Status: DISCONTINUED | OUTPATIENT
Start: 2024-12-29 | End: 2025-01-03 | Stop reason: HOSPADM

## 2024-12-29 RX ADMIN — CARVEDILOL 3.12 MG: 3.12 TABLET, FILM COATED ORAL at 04:12

## 2024-12-29 RX ADMIN — CARVEDILOL 3.12 MG: 3.12 TABLET, FILM COATED ORAL at 08:12

## 2024-12-29 RX ADMIN — ATORVASTATIN CALCIUM 40 MG: 40 TABLET, FILM COATED ORAL at 06:12

## 2024-12-29 RX ADMIN — ENOXAPARIN SODIUM 40 MG: 40 INJECTION SUBCUTANEOUS at 06:12

## 2024-12-29 RX ADMIN — ACETAMINOPHEN 500 MG: 500 TABLET ORAL at 09:12

## 2024-12-29 RX ADMIN — LOSARTAN POTASSIUM 100 MG: 50 TABLET, FILM COATED ORAL at 09:12

## 2024-12-29 RX ADMIN — ACETAMINOPHEN 500 MG: 500 TABLET ORAL at 02:12

## 2024-12-29 RX ADMIN — HYDROCHLOROTHIAZIDE 12.5 MG: 12.5 TABLET ORAL at 06:12

## 2024-12-29 NOTE — PT/OT/SLP EVAL
Occupational Therapy   Evaluation    Name: Taiwo Boyd  MRN: [id number]  Admitting Diagnosis: Fracture of ramus of left pubis  Recent Surgery: * No surgery found *      Recommendations:     Discharge Recommendations: Moderate Intensity Therapy  Discharge Equipment Recommendations:  to be determined by next level of care  Barriers to discharge:  Decreased caregiver support    Assessment:     Taiwo Boyd is a 89 y.o. female with a medical diagnosis of Fracture of ramus of left pubis.  She presents with the following performance deficits affecting function: weakness, impaired endurance, impaired self care skills, impaired functional mobility, gait instability, impaired balance, decreased coordination, decreased lower extremity function, decreased safety awareness, pain, decreased ROM, impaired coordination, edema, orthopedic precautions.          Rehab Prognosis: Good; patient would benefit from acute skilled OT services to address these deficits and reach maximum level of function.       Plan:     Patient to be seen 4 x/week to address the above listed problems via self-care/home management, therapeutic activities, therapeutic exercises  Plan of Care Expires: 01/28/25  Plan of Care Reviewed with: patient    Subjective     Chief Complaint: pain in L hip/leg with movement  Patient/Family Comments/goals: to return to [facility]    Occupational Profile:  Living Environment: pt lives alone in a [facility] with 2 steps to enter (1 to get on porch; 1 to enter door) - t/s combo for bathing but does not use due to fear of falling (sponge bathes at sink)  Previous level of function: mod I with mobility and ADLS - cooks  Equipment Used at Home: walker, rolling, rollator, cane, quad  Assistance upon Discharge: daughters may assist but unsure of frequency    Pain/Comfort:  Pain Rating 1: 0/10 (at rest, but increases significantly with movement)  Location - Side 1: Left  Location 1:  (hip/leg)  Pain Addressed 1: Reposition, Distraction,  Cessation of Activity, Nurse notified  Pain Rating Post-Intervention 1: 0/10 (at rest)    Patients cultural, spiritual, Tenriism conflicts given the current situation: no    Objective:     Communicated with: nurse prior to session.  Patient found HOB elevated with bed alarm, PureWick, telemetry upon OT entry to room.    General Precautions: Standard, fall  Orthopedic Precautions:  (awaiting Ortho consult to clarify)  Braces: N/A  Respiratory Status: Room air    Occupational Performance:    Bed Mobility:    Patient completed Rolling/Turning to Left with  maximal assistance and with side rail  Patient completed Rolling/Turning to Right with maximal assistance and with side rail  Patient completed Scooting/Bridging with max A x2 to HOB  Pt unable to tolerate transition to sitting EOB due to significant pain with rolling to side - declined attempts    Functional Mobility/Transfers:  N/A - pt unable to sit EOB due to significant pain with movement - standing not attempting - still awaiting Ortho consult for final WB status      Activities of Daily Living:  Feeding:  independence    Grooming: set up A to wash face with cloth  Lower Body Dressing: total assistance to adry socks     Cognitive/Visual Perceptual:  Cognitive/Psychosocial Skills:     -       Oriented to: Person, Place, Time, and Situation   -       Follows Commands/attention:Easily distracted and Follows one-step commands  -       Communication: clear/fluent  -       Memory: No Deficits noted  -       Safety awareness/insight to disability: impaired   -       Mood/Affect/Coping skills/emotional control: Appropriate to situation    Physical Exam:  Balance: -       unable to assess - did not tolerate attempt to sit EOB due to pain - unable to stand (awaiting WB precautions from ortho)     Skin integrity: Visible skin intact  Edema:  Moderate L knee  Sensation: -       Intact  Upper Extremity Range of Motion:   BUEs = WFL  Upper Extremity Strength:  BUEs =  WFL   Strength:  BUEs = WFL    AMPAC 6 Click ADL:  AMPAC Total Score: 15    Treatment & Education:  Pt completed ADLs and func mobility activities for tx session as noted above  Pt educated on role of OT and POC      Patient left HOB elevated with all lines intact, call button in reach, bed alarm on, and nurse notified    GOALS:   Multidisciplinary Problems       Occupational Therapy Goals          Problem: Occupational Therapy    Goal Priority Disciplines Outcome Interventions   Occupational Therapy Goal     OT, PT/OT Progressing    Description: Goals to be met by: 01/28/25     Patient will increase functional independence with ADLs by performing:    UE Dressing with Modified [place].  LE Dressing with Modified [place].  Grooming while standing at sink with Modified [place].  Toileting from toilet with Modified [place] for hygiene and clothing management.   Toilet transfer to toilet with Modified [place].                         History:     Past Medical History:   Diagnosis Date    Anemia in stage 3 chronic kidney disease 4/19/2018    CKD (chronic kidney disease) stage 3, GFR 30-59 ml/min     Disorder of kidney and ureter     Hypertension     Hyponatremia     Secondary hyperparathyroidism          Past Surgical History:   Procedure Laterality Date    APPENDECTOMY      HYSTERECTOMY      TONSILLECTOMY         Time Tracking:     OT Date of Treatment: 12/29/24  OT Start Time: 0836  OT Stop Time: 0856  OT Total Time (min): 20 min - coeval with PT    Billable Minutes:Evaluation 10  Therapeutic Activity 10    12/29/2024

## 2024-12-29 NOTE — ED NOTES
Pt presents to ED c/o fall with associated left hip pain. Pt reports shoe getting stuck to floor causing her to fall. PT denies LOC, head and neck trauma. Pt is not on blood thinners.

## 2024-12-29 NOTE — PLAN OF CARE
Problem: Adult Inpatient Plan of Care  Goal: Plan of Care Review  Outcome: Progressing     Problem: Adult Inpatient Plan of Care  Goal: Optimal Comfort and Wellbeing  Outcome: Progressing     Problem: Orthopaedic Fracture  Goal: Fracture Stability  Outcome: Progressing     Problem: Orthopaedic Fracture  Goal: Optimal Pain Control and Function  Outcome: Progressing     Problem: Fall Injury Risk  Goal: Absence of Fall and Fall-Related Injury  Outcome: Progressing     Patient arrived on the unit @ 0215 hrs.Oriented the patient to nurse call button. Vitals taken. Plan of care reviewed with the patient. Scheduled medicines given and the patient tolerated well. Given Tylenol 500 mg PO for Lt hip pain. Fall and safety precautions taken and the standard interventions are in place. Advised the patient to call for assistance. Continued monitoring the patient.

## 2024-12-29 NOTE — H&P
[facility] Medicine H&P Note     Admitting Team: [facility] Hospitalist Team B  Attending Physician: Darvin Cr MD  Resident: Dr. Wright  Intern: Dr. Encarnacion    Date of Admit: 12/28/2024    Chief Complaint     Fall (Brought in by [facility] from home. Patient walking to bathroom, shoes got stuck and patient fell on L side. Denies HH and LOC. - blood thinners. Complaints of L hip pain. No shortening or rotation. CMS intact. )    Subjective:      History of Present Illness:  Taiwo Boyd is a 89 y.o.  female who  has a past medical history of Iron Deficiency Anemia in stage 3 chronic kidney disease (4/19/2018), chronic kidney disease) stage 3, GFR 30-59 ml/min, Hypertension, Hyperlipidemia, and Secondary hyperparathyroidism. The patient presented to [facility] on 12/28/2024 with a primary complaint of Fall.  Patient walking to bathroom, shoes got stuck and patient fell onto her left side onto left hip/left arm. Complaining of left hip pain with severe pain with movement. Endorses full ROM in left arm/shoulder and denies pain. Denies hitting her head and LOC. She is not taking any blood thinners.     Past Medical History:  Past Medical History:   Diagnosis Date    Anemia in stage 3 chronic kidney disease 4/19/2018    CKD (chronic kidney disease) stage 3, GFR 30-59 ml/min     Disorder of kidney and ureter     Hypertension     Hyponatremia     Secondary hyperparathyroidism        Past Surgical History:  Past Surgical History:   Procedure Laterality Date    APPENDECTOMY      HYSTERECTOMY      TONSILLECTOMY         Allergies:  Review of patient's allergies indicates:   Allergen Reactions    Codeine phosphate Rash and Other (See Comments)       Home Medications:  Prior to Admission medications    Medication Sig Start Date End Date Taking? Authorizing Provider   atorvastatin (LIPITOR) 40 MG tablet Take 1 tablet by mouth once daily 1/12/24  Yes Fernando Lamas MD   carvediloL (COREG) 3.125 MG  "tablet Take 1 tablet by mouth twice daily 24  Yes Fernando Lamas MD   ergocalciferol (ERGOCALCIFEROL) 50,000 unit Cap Take 1 capsule (50,000 Units total) by mouth every 30 days. 6/10/24  Yes Poonam Alexis MD   ferrous sulfate 325 mg (65 mg iron) Tab tablet Take 325 mg by mouth once daily.   Yes Provider, Historical   hydroCHLOROthiazide (HYDRODIURIL) 12.5 MG Tab TAKE 1 TABLET BY MOUTH ONCE DAILY IN THE MORNING 24  Yes Fernando Lamas MD   irbesartan (AVAPRO) 300 MG tablet Take 1 tablet by mouth once daily 24  Yes Fernando Lamas MD       Family History:  Family History   Family history unknown: Yes       Social History:  Social History     Tobacco Use    Smoking status: Never    Smokeless tobacco: Never   Substance Use Topics    Alcohol use: No    Drug use: No       Review of Systems:  All other systems are reviewed and are negative.    Health Maintaince :   Primary Care Physician: Dr. Lamas    Immunizations:   Tdap: Unknown  Flu: Up to date   Pna: Up to date    Cancer Screening:  PAP: S/p Hysterectomy  MMG: No longer indicated  Colonoscopy: No longer indicated     Objective:   Last 24 Hour Vital Signs:  BP  Min: 126/82  Max: 126/82  Temp  Av.8 °F (37.1 °C)  Min: 98.8 °F (37.1 °C)  Max: 98.8 °F (37.1 °C)  Pulse  Av  Min: 69  Max: 69  Resp  Av  Min: 16  Max: 16  SpO2  Av %  Min: 99 %  Max: 99 %  Height  Av' 3" (160 cm)  Min: 5' 3" (160 cm)  Max: 5' 3" (160 cm)  Weight  Av.9 kg (143 lb)  Min: 64.9 kg (143 lb)  Max: 64.9 kg (143 lb)  Body mass index is 25.33 kg/m².  No intake/output data recorded.    Physical Examination:  Physical Exam  Constitutional:       Appearance: Normal appearance.   HENT:      Head: Normocephalic and atraumatic.      Mouth/Throat:      Comments: edentulous  Cardiovascular:      Rate and Rhythm: Normal rate and regular rhythm.      Heart sounds: No murmur heard.  Pulmonary:      Effort: Pulmonary effort is normal. No " respiratory distress.      Breath sounds: Normal breath sounds.   Abdominal:      General: Abdomen is flat. Bowel sounds are normal.      Palpations: Abdomen is soft.   Musculoskeletal:      Left hip: Tenderness present. Decreased range of motion.   Neurological:      Mental Status: She is alert.      Motor: Motor function is intact.      Comments: Gait not assessed as patient unable to bear weight        Laboratory:  Most Recent Data:  CBC:   Lab Results   Component Value Date    WBC 5.29 06/01/2024    HGB 11.9 (L) 06/01/2024    HCT 36.7 (L) 06/01/2024     06/01/2024    MCV 90 06/01/2024    RDW 13.4 06/01/2024       BMP:   Lab Results   Component Value Date     12/29/2024    K 4.4 12/29/2024     12/29/2024    CO2 21 (L) 12/29/2024    BUN 45 (H) 12/29/2024    CREATININE 0.9 12/29/2024    GLU 90 12/29/2024    CALCIUM 9.6 12/29/2024    MG 1.7 06/01/2024    PHOS 3.1 06/01/2024     LFTs:   Lab Results   Component Value Date    PROT 6.6 12/29/2024    ALBUMIN 3.8 12/29/2024    BILITOT 0.5 12/29/2024    AST 21 12/29/2024    ALKPHOS 73 12/29/2024    ALT 22 12/29/2024       FLP:   Lab Results   Component Value Date    CHOL 152 04/01/2024    HDL 58 04/01/2024    LDLCALC 85.4 04/01/2024    TRIG 43 04/01/2024    CHOLHDL 38.2 04/01/2024     DM:   Lab Results   Component Value Date    HGBA1C 5.4 04/01/2024    HGBA1C 5.4 03/20/2023    HGBA1C 5.6 11/07/2022    MICROALBUR 0.3 02/24/2020    LDLCALC 85.4 04/01/2024    CREATININE 0.9 12/29/2024       Anemia:   Lab Results   Component Value Date    IRON 87 06/01/2024    TIBC 295 06/01/2024    FERRITIN 280 06/01/2024    NDLWDJUM09 337 03/20/2023    FOLATE 8.9 03/20/2023     Cardiac:   Lab Results   Component Value Date    TROPONINI 0.010 08/18/2022     Urinalysis:   Lab Results   Component Value Date    COLORU Yellow 06/01/2024    SPECGRAV 1.010 06/01/2024    NITRITE Positive (A) 06/01/2024    KETONESU Negative 06/01/2024    UROBILINOGEN Negative 06/01/2024    WBCUA 0  06/01/2024       Trended Lab Data:  Recent Labs   Lab 12/29/24  0109      K 4.4      CO2 21*   BUN 45*   CREATININE 0.9   GLU 90   PROT 6.6   ALBUMIN 3.8   BILITOT 0.5   AST 21   ALKPHOS 73   ALT 22       Radiology:  Imaging Results              CT Hip Without Contrast Left (Final result)  Result time 12/28/24 23:56:50      Final result by Gucci Li DO (12/28/24 23:56:50)                   Impression:      1. Mildly displaced fractures of the left superior and inferior pubic rami.  2. Suspected nondisplaced S5 anterior cortex fracture.  3. Small amount of hemorrhage in the left anterior pelvis.      Electronically signed by: Gucci Li  Date:    12/28/2024  Time:    23:56               Narrative:    EXAMINATION:  CT HIP WITHOUT CONTRAST LEFT    CLINICAL HISTORY:  Fracture, hip;    TECHNIQUE:  Axial CT images of the left hip with sagittal and coronal reformats without intravenous contrast.    COMPARISON:  Radiographs from earlier the same date.    FINDINGS:  There is diffuse osteopenia.  There is a comminuted, mildly displaced fracture of the left superior pubic ramus.  There is a mildly displaced fracture of the left inferior pubic ramus.  There is a slight cortical irregularity of the 5th sacral segment, anterior cortex, suspicious for a nondisplaced fracture no additional fractures are seen.  There is mild joint space narrowing of the left femoroacetabular joint.  There is moderate joint space narrowing of the pubic symphysis with adjacent subchondral sclerosis and cystic changes.  There is no evidence of a joint effusion.  There is hemorrhage in the left anterior pelvis, adjacent to the superior pubic ramus fracture.  The hemorrhage abuts the left lateral wall of the urinary bladder.  The uterus is absent.  There is colonic diverticulosis without acute diverticulitis.  There is a moderate volume of stool in the rectum.                                       X-Ray Hip 2 or 3 views Left with  Pelvis when performed (Final result)  Result time 12/28/24 21:40:15      Final result by Gucci Li DO (12/28/24 21:40:15)                   Impression:      Mildly displaced fractures of the left superior and inferior pubic rami.      Electronically signed by: Gucci Li  Date:    12/28/2024  Time:    21:40               Narrative:    EXAMINATION:  XR HIP WITH PELVIS WHEN PERFORMED 2 OR 3 VIEWS LEFT    CLINICAL HISTORY:  fall with pain;    TECHNIQUE:  AP view of the pelvis and frog leg lateral view of the left hip were performed.    COMPARISON:  None    FINDINGS:  There is diffuse osteopenia.  There are mildly displaced fractures of the left superior and inferior pubic rami.  No additional fractures are seen.  The femoral heads are well seated in the acetabula.                                       Assessment:     Taiwo Boyd is a 89 y.o. female with:  Patient Active Problem List    Diagnosis Date Noted    Fracture of ramus of left pubis 12/29/2024    Essential hypertension 04/05/2022    Secondary hyperparathyroidism of renal origin 04/05/2022    High cholesterol 05/27/2021    Vitamin D deficiency 03/03/2020        Plan:     Left Hip Fracture  -  2/2 mechanical fall  - CT Left Hip: mildly displaced fracture of left superior and inferior pubic rami, suspected S5 anterior cortex fracture  Plan:  - PT/OT  - Pain control  - refer to Ortho on discharge    Hypertension  - BP controlled on Coreg 3.125 mg BID, HCTZ 12.5 mg daily, Irbesartan 300 mg daily  - continue meds inpatient, Losartan (irbesartan not on formulary) while inpatient    Hyperlipidemia  - continue Lipitor 40 mg nightly    Hx Iron Deficiency Anemia  - ferritin of 280 in June 2024  - may discontinue Ferrous sulfate at this time     Code: Full  Diet: Soft and Bite sized (patient wears dentures)  Ppx: Lovenox     Dispo: Pending PT/OT eval, weight bearing status    Marlee Montano MD  [facility] -2    [facility] Medicine Hospitalist  Pager numbers:   [facility] Hospitalist Medicine Team B (Shaye): [phone]

## 2024-12-29 NOTE — PLAN OF CARE
Problem: Occupational Therapy  Goal: Occupational Therapy Goal  Description: Goals to be met by: 01/28/25     Patient will increase functional independence with ADLs by performing:    UE Dressing with Modified [place].  LE Dressing with Modified [place].  Grooming while standing at sink with Modified [place].  Toileting from toilet with Modified [place] for hygiene and clothing management.   Toilet transfer to toilet with Modified [place].    Outcome: Progressing     Pt was agreeable to and participated in OT/PT evaluation.  Pt lives alone and reports completing ADLS and func mobility skills with mod I at OF.  Pt completed evaluation and noted to require max A with func mobility (eval limited to bedside due to pain) and setup - total A with ADLs.  Goals established to assist pt with returning to PLOF regarding ADLs and func mobility.  Pt will benefit from skilled OT services in order to increase her level of safety and independence with ADLs and mobility.      Kayleen Stearns, OT  12/29/2024

## 2024-12-29 NOTE — PT/OT/SLP EVAL
Physical Therapy Evaluation and Treatment    Patient Name: Taiwo Boyd   MRN: [id number]  Recent Surgery: * No surgery found *      Recommendations:     Discharge Recommendations: Moderate Intensity Therapy   Discharge Equipment Recommendations: to be determined by next level of care   Barriers to discharge: Increased level of assist and Decreased caregiver support    Assessment:     Taiwo Boyd is a 89 y.o. female admitted with a medical diagnosis of Fracture of ramus of left pubis. She presents with the following impairments/functional limitations: weakness, impaired endurance, impaired self care skills, impaired functional mobility, gait instability, impaired balance, decreased lower extremity function, decreased safety awareness, pain. Patient continues to demonstrate the need for moderate intensity therapy on a daily basis post acute exhibited by decreased independence with functional mobility .  Patient unable to tolerate lying on either side due to increased pain.  Waiting for WB clarification prior to OOB activity.     Rehab Prognosis: Good; patient would benefit from acute PT services to address these deficits and reach maximum level of function.    Plan:     During this hospitalization, patient to be seen 5 x/week to address the above listed problems via gait training, therapeutic activities, therapeutic exercises, neuromuscular re-education    Plan of Care Expires: 01/10/25    Subjective     Chief Complaint: pain   Patient Comments/Goals: to get better and go back home  Pain/Comfort:  Pain Rating 1: 0/10  Location - Side 1: Left  Location 1: hip  Pain Addressed 1: Reposition, Distraction, Cessation of Activity, Nurse notified  Pain Rating Post-Intervention 1: 0/10    Social History:  Living Environment: Patient lives alone in a single story home with tub-shower combo  Prior Level of Function: Prior to admission, patient was modified independent  Equipment Used at Home: walker, rolling, rollator, cane,  quad  DME owned (not currently used): none  Assistance Upon Discharge: unknown    Objective:       Co-evaluation/treatment performed due to patient's multiple deficits requiring two skilled therapists to appropriately and safely assess patient's strength and endurance while facilitating functional tasks in addition to accommodating for patient's activity tolerance.        Communicated with Kell prior to session. Patient found HOB elevated with bed alarm, PureWick, peripheral IV upon PT entry to room.    General Precautions: Standard, fall   Orthopedic Precautions:  (waiting clarification from ortho)   Braces: N/A    Respiratory Status: Room air    Exams:  RLE ROM: WFL  RLE Strength: WFL  LLE ROM: Deficits: increased pain with mobility  LLE Strength: Deficits: strength limited due to pain  Cognitive: Patient is oriented to Person, Place, Time, Situation  Gross Motor Coordination: WFL    Functional Mobility:  Gait belt applied - N/A  Bed Mobility  Rolling Left: maximal assistance  Rolling Right: maximal assistance  Scooting: maximal assistance and of 2 persons  Transfers  Unable due to pain  Gait  Unable due to pain and waiting for WB status  Therapeutic Activities and Exercises:  Patient educated on role of acute care PT and PT POC, safety while in hospital including calling nurse for mobility, and call light usage.      AM-PAC 6 CLICK MOBILITY  Total Score:7    Patient left HOB elevated with all lines intact and call button in reach.    GOALS:   Multidisciplinary Problems       Physical Therapy Goals          Problem: Physical Therapy    Goal Priority Disciplines Outcome Interventions   Physical Therapy Goal     PT, PT/OT     Description: Goals to be met by: Discharge     Patient will increase functional independence with mobility by performin. Supine to sit with Moderate Assistance   WAITING FOR WB STATUS FROM ORTHO                         History:     Past Medical History:   Diagnosis Date    Anemia in  stage 3 chronic kidney disease 4/19/2018    CKD (chronic kidney disease) stage 3, GFR 30-59 ml/min     Disorder of kidney and ureter     Hypertension     Hyponatremia     Secondary hyperparathyroidism        Past Surgical History:   Procedure Laterality Date    APPENDECTOMY      HYSTERECTOMY      TONSILLECTOMY         Time Tracking:     PT Received On: 12/29/24  PT Start Time: 0836  PT Stop Time: 0856  PT Total Time (min): 20 min     Billable Minutes: Evaluation 20    12/29/2024

## 2024-12-29 NOTE — PLAN OF CARE
Problem: Adult Inpatient Plan of Care  Goal: Plan of Care Review  Outcome: Progressing     VIRTUAL NURSE:  Cued into patient's room.  Permission received per patient to turn camera to view patient.  Introduced as VN for night shift that will be working with floor nurse and nursing assistant.  Educated patient on VN's role in patient care and  VIP model.  Plan of care reviewed with patient.  Education per flowsheet.   Informed patient that staff will round on them every 2 hours but to use call light for any other needs they may have; informed of fall risk and fall precautions.  Patient verbalized understanding.  Call light within reach; bed siderails up x3.  Opportunity given for questions and questions answered.  Admission assessment questions answered.  Patient denies complaints or any needs at this time. Instructed to call for assistance.  Will cont to monitor and intervene as needed.    Labs, notes, orders, and careplan initiated.       12/29/24 0248   Patient Request   Patient Requested no complaints or needs currently   Admission   Initial VN Admission Questions Complete   Communication Issues? Technical Issue   Shift   Virtual Nurse - Rounding Complete   Pain Management Interventions pain management plan reviewed with patient/caregiver   Virtual Nurse - Patient Verbalized Approval Of Camera Use;VN Rounding   Type of Frequent Check   Type Patient Rounds   Safety/Activity   Patient Rounds bed in low position;placement of personal items at bedside;bed wheels locked;call light in patient/parent reach;visualized patient;clutter free environment maintained   Safety Promotion/Fall Prevention assistive device/personal item within reach;commode/urinal/bedpan at bedside;high risk medications identified;Fall Risk reviewed with patient/family;diversional activities provided;medications reviewed;nonskid shoes/socks when out of bed;room near unit station;side rails raised x 3;supervised activity;Supervised toileting -  stay within arms reach;instructed to call staff for mobility   Safety Precautions emergency equipment at bedside   Positioning   Body Position neutral body alignment;neutral head position   Head of Bed (HOB) Positioning HOB at 60-90 degrees   Pain/Comfort/Sleep   Preferred Pain Scale number (Numeric Rating Pain Scale)   Comfort/Acceptable Pain Level 0   Sleep/Rest/Relaxation no problem identified;awake   Pain Reassessment   Pain Rating Prior to Med Admin 7

## 2024-12-29 NOTE — ED PROVIDER NOTES
Emergency Department Encounter  Provider Note  Encounter Date: 12/28/2024    Patient Name: Taiwo Boyd  MRN: [id number]    History of Present Illness   HPI  History of Present Illness:    Chief Complaint:   Chief Complaint   Patient presents with    Fall     Brought in by [facility] from home. Patient walking to bathroom, shoes got stuck and patient fell on L side. Denies HH and LOC. - blood thinners. Complaints of L hip pain. No shortening or rotation. CMS intact.      89-year-old female presenting with mechanical fall onto her left hip.  Unable to ambulate afterwards.  Denies head strike, chest pain, shortness of breath prior to falling.  Denies being on blood thinners.    The following PMH/PSH/SocHx/FamHx has been reviewed by myself:  Past Medical History:   Diagnosis Date    Anemia in stage 3 chronic kidney disease 4/19/2018    CKD (chronic kidney disease) stage 3, GFR 30-59 ml/min     Disorder of kidney and ureter     Hypertension     Hyponatremia     Secondary hyperparathyroidism      Past Surgical History:   Procedure Laterality Date    APPENDECTOMY      HYSTERECTOMY      TONSILLECTOMY       Social History     Tobacco Use    Smoking status: Never    Smokeless tobacco: Never   Substance Use Topics    Alcohol use: No    Drug use: No     Family History   Family history unknown: Yes     Allergies reviewed:  Review of patient's allergies indicates:   Allergen Reactions    Codeine phosphate Rash and Other (See Comments)     Medications reviewed:  Medication List with Changes/Refills   Current Medications    ATORVASTATIN (LIPITOR) 40 MG TABLET    Take 1 tablet by mouth once daily    CARVEDILOL (COREG) 3.125 MG TABLET    Take 1 tablet by mouth twice daily    ERGOCALCIFEROL (ERGOCALCIFEROL) 50,000 UNIT CAP    Take 1 capsule (50,000 Units total) by mouth every 30 days.    FERROUS SULFATE 325 MG (65 MG IRON) TAB TABLET    Take 325 mg by mouth once daily.    HYDROCHLOROTHIAZIDE (HYDRODIURIL) 12.5 MG TAB    TAKE 1 TABLET BY  MOUTH ONCE DAILY IN THE MORNING    IRBESARTAN (AVAPRO) 300 MG TABLET    Take 1 tablet by mouth once daily       Physical Exam     Initial Vitals [12/28/24 2029]   BP Pulse Resp Temp SpO2   126/82 69 16 98.8 °F (37.1 °C) 99 %      MAP       --           Triage vital signs reviewed.    Constitutional: Well-nourished, well-developed. Not in acute distress.  HENT: Normocephalic, atraumatic. Moist mucous membranes.  Eyes: No conjunctival injection.  Resp: Normal respiratory effort, breathing unlabored.  Cardio: Regular rate and rhythm.  GI: Abdomen non-distended.   MSK:  Left lower extremity slightly internally rotated, pain with palpation of the left hip.  No pain with palpation of the left knee, ankle.  No open areas.  Skin: Warm and dry. No rashes or lesions noted.  Neuro: Awake and alert. Moves all extremities.    ED Course   Procedures    Medical Decision Making    History Acquisition     The history is provided by the patient.     Review of prior external/non ED notes:  Followed by Internal Medicine for essential hypertension    Differential Diagnoses   Based on available information and initial assessment, the working Differential Diagnosis includes, but is not limited to:  Fracture, dislocation, compartment syndrome, nerve injury/palsy, vascular injury, DVT, rhabdomyolysis, hemarthrosis, septic joint, cellulitis, bursitis, muscle strain, ligament tear/sprain, laceration, foreign body, abrasion, soft tissue contusion, osteoarthritis.    EKG   EKG ordered and independently reviewed by me:     Labs   Lab tests ordered and independently reviewed by me:    Labs Reviewed   CBC W/ AUTO DIFFERENTIAL   COMPREHENSIVE METABOLIC PANEL   URINALYSIS, REFLEX TO URINE CULTURE       Imaging   Imaging ordered and independently reviewed by me:   Imaging Results              CT Hip Without Contrast Left (Final result)  Result time 12/28/24 23:56:50      Final result by Gucci Li DO (12/28/24 23:56:50)                    Impression:      1. Mildly displaced fractures of the left superior and inferior pubic rami.  2. Suspected nondisplaced S5 anterior cortex fracture.  3. Small amount of hemorrhage in the left anterior pelvis.      Electronically signed by: Gucci Li  Date:    12/28/2024  Time:    23:56               Narrative:    EXAMINATION:  CT HIP WITHOUT CONTRAST LEFT    CLINICAL HISTORY:  Fracture, hip;    TECHNIQUE:  Axial CT images of the left hip with sagittal and coronal reformats without intravenous contrast.    COMPARISON:  Radiographs from earlier the same date.    FINDINGS:  There is diffuse osteopenia.  There is a comminuted, mildly displaced fracture of the left superior pubic ramus.  There is a mildly displaced fracture of the left inferior pubic ramus.  There is a slight cortical irregularity of the 5th sacral segment, anterior cortex, suspicious for a nondisplaced fracture no additional fractures are seen.  There is mild joint space narrowing of the left femoroacetabular joint.  There is moderate joint space narrowing of the pubic symphysis with adjacent subchondral sclerosis and cystic changes.  There is no evidence of a joint effusion.  There is hemorrhage in the left anterior pelvis, adjacent to the superior pubic ramus fracture.  The hemorrhage abuts the left lateral wall of the urinary bladder.  The uterus is absent.  There is colonic diverticulosis without acute diverticulitis.  There is a moderate volume of stool in the rectum.                                       X-Ray Hip 2 or 3 views Left with Pelvis when performed (Final result)  Result time 12/28/24 21:40:15      Final result by Gucci Li DO (12/28/24 21:40:15)                   Impression:      Mildly displaced fractures of the left superior and inferior pubic rami.      Electronically signed by: Gucci Li  Date:    12/28/2024  Time:    21:40               Narrative:    EXAMINATION:  XR HIP WITH PELVIS WHEN PERFORMED 2 OR 3 VIEWS  LEFT    CLINICAL HISTORY:  fall with pain;    TECHNIQUE:  AP view of the pelvis and frog leg lateral view of the left hip were performed.    COMPARISON:  None    FINDINGS:  There is diffuse osteopenia.  There are mildly displaced fractures of the left superior and inferior pubic rami.  No additional fractures are seen.  The femoral heads are well seated in the acetabula.                                         Additional Consideration   Taiwo Boyd  presents to the Emergency Department today with left hip pain after mechanical fall on the left side.  X-ray, disposition pending, likely admit as patient is unable to bear any weight.    Additional testing considered but not indicated during the course of this workup: further imaging and labwork, not indicated  Co-morbidities/chronic illness/exacerbation of chronic illness considered which impacted clinical decision making:  Advanced age  Procedures done in the ED or plan for the OR: No  Social determinants of care considered during development of treatment plan include: Decreased medical literacy  Discussion of management or test interpretation with external provider: Yes, describe:  See ED course  DNR discussion: No    The patient's list of active medications and allergies as documented per RN staff has been reviewed.  Medications given in the ED and/or prescribed:   Medications   acetaminophen tablet 1,000 mg (has no administration in time range)             ED Course as of 12/29/24 0028   Sat Dec 28, 2024   2151 X-Ray Hip 2 or 3 views Left with Pelvis when performed  Superior and inferior pubic ramus fractures [CS]   2152 Can't bear weight. Adt placed [CS]   2331 Spoke w Dr Salazar of ortho at [facility], CT hip, if acetab fx, call him back [CS]   Sun Dec 29, 2024   0012 CT Hip Without Contrast Left  No acetab fx, has other findings. Will admit, pt and family updated [CS]   0027 Sign-out given to Dr. Montano at [facility] [CS]      ED Course User  Index  [CS] Evelina Anderson MD       Explanation of disposition: Admit    Clinical Impression:     1. Pubic ramus fracture, left, closed, initial encounter             Evelina Anderson MD  12/29/24 0028

## 2024-12-29 NOTE — CONSULTS
[facility]  Response for E-Consult     Patient Name: Taiwo Boyd  MRN: [id number]  Primary Care Provider: Fernando Lamas MD   Requesting Provider: Evelina Anderson MD  Consults    I received a phone call from the ER doc late last night regarding this patient.  Reported left pelvic pain.  Radiographs show left superior and inferior pubic rami fractures.  I recommended a CT scan for further assessment and to rule out any weight-bearing fracture of the acetabulum.  CT shows the left-sided rami fractures.  Minimal displacement.  Generally this is a condition treatment with the conservative care.  Pain control and weight-bearing to tolerance.  We would recommend follow up with Orthopedic Surgery at [place] once on site coverage available on Mon.    Yusuf Salazar MD  [facility]

## 2024-12-29 NOTE — PLAN OF CARE
Inpatient Upgrade Note    Taiwo Boyd has warranted treatment spanning two or more midnights of hospital level care for the management of  fracture of ramus of L pubis . She continues to require further testing/imaging and further evaluation by consultants. Her condition is also complicated by the following comorbidities: Hypertension and Chronic kidney disease.    Helen Encarnacion MD  [facility], PGY-I  [facility] Hospitalist Team B

## 2024-12-30 LAB
ANION GAP SERPL CALC-SCNC: 8 MMOL/L (ref 8–16)
BASOPHILS # BLD AUTO: 0.03 K/UL (ref 0–0.2)
BASOPHILS NFR BLD: 0.5 % (ref 0–1.9)
BUN SERPL-MCNC: 37 MG/DL (ref 8–23)
CALCIUM SERPL-MCNC: 9.1 MG/DL (ref 8.7–10.5)
CHLORIDE SERPL-SCNC: 106 MMOL/L (ref 95–110)
CO2 SERPL-SCNC: 23 MMOL/L (ref 23–29)
CREAT SERPL-MCNC: 0.9 MG/DL (ref 0.5–1.4)
DIFFERENTIAL METHOD BLD: ABNORMAL
EOSINOPHIL # BLD AUTO: 0.2 K/UL (ref 0–0.5)
EOSINOPHIL NFR BLD: 3.2 % (ref 0–8)
ERYTHROCYTE [DISTWIDTH] IN BLOOD BY AUTOMATED COUNT: 13.2 % (ref 11.5–14.5)
EST. GFR  (NO RACE VARIABLE): >60 ML/MIN/1.73 M^2
GLUCOSE SERPL-MCNC: 105 MG/DL (ref 70–110)
HCT VFR BLD AUTO: 30.1 % (ref 37–48.5)
HGB BLD-MCNC: 10.1 G/DL (ref 12–16)
IMM GRANULOCYTES # BLD AUTO: 0.01 K/UL (ref 0–0.04)
IMM GRANULOCYTES NFR BLD AUTO: 0.2 % (ref 0–0.5)
LYMPHOCYTES # BLD AUTO: 1.7 K/UL (ref 1–4.8)
LYMPHOCYTES NFR BLD: 28 % (ref 18–48)
MAGNESIUM SERPL-MCNC: 1.9 MG/DL (ref 1.6–2.6)
MCH RBC QN AUTO: 30.1 PG (ref 27–31)
MCHC RBC AUTO-ENTMCNC: 33.6 G/DL (ref 32–36)
MCV RBC AUTO: 90 FL (ref 82–98)
MONOCYTES # BLD AUTO: 0.7 K/UL (ref 0.3–1)
MONOCYTES NFR BLD: 11.1 % (ref 4–15)
NEUTROPHILS # BLD AUTO: 3.4 K/UL (ref 1.8–7.7)
NEUTROPHILS NFR BLD: 57 % (ref 38–73)
NRBC BLD-RTO: 0 /100 WBC
OHS QRS DURATION: 120 MS
OHS QTC CALCULATION: 410 MS
PHOSPHATE SERPL-MCNC: 2.4 MG/DL (ref 2.7–4.5)
PLATELET # BLD AUTO: 168 K/UL (ref 150–450)
PMV BLD AUTO: 11.7 FL (ref 9.2–12.9)
POTASSIUM SERPL-SCNC: 4.1 MMOL/L (ref 3.5–5.1)
RBC # BLD AUTO: 3.36 M/UL (ref 4–5.4)
SODIUM SERPL-SCNC: 137 MMOL/L (ref 136–145)
WBC # BLD AUTO: 5.96 K/UL (ref 3.9–12.7)

## 2024-12-30 PROCEDURE — 25000003 PHARM REV CODE 250

## 2024-12-30 PROCEDURE — 36415 COLL VENOUS BLD VENIPUNCTURE: CPT

## 2024-12-30 PROCEDURE — 84100 ASSAY OF PHOSPHORUS: CPT

## 2024-12-30 PROCEDURE — 97530 THERAPEUTIC ACTIVITIES: CPT

## 2024-12-30 PROCEDURE — 63600175 PHARM REV CODE 636 W HCPCS: Performed by: ORTHOPAEDIC SURGERY

## 2024-12-30 PROCEDURE — 85025 COMPLETE CBC W/AUTO DIFF WBC: CPT

## 2024-12-30 PROCEDURE — 11000001 HC ACUTE MED/SURG PRIVATE ROOM

## 2024-12-30 PROCEDURE — 25000003 PHARM REV CODE 250: Performed by: INTERNAL MEDICINE

## 2024-12-30 PROCEDURE — 97535 SELF CARE MNGMENT TRAINING: CPT

## 2024-12-30 PROCEDURE — 80048 BASIC METABOLIC PNL TOTAL CA: CPT

## 2024-12-30 PROCEDURE — 63600175 PHARM REV CODE 636 W HCPCS

## 2024-12-30 PROCEDURE — 83735 ASSAY OF MAGNESIUM: CPT

## 2024-12-30 RX ORDER — LANOLIN ALCOHOL/MO/W.PET/CERES
1 CREAM (GRAM) TOPICAL DAILY
Status: DISCONTINUED | OUTPATIENT
Start: 2024-12-30 | End: 2025-01-03 | Stop reason: HOSPADM

## 2024-12-30 RX ORDER — CARVEDILOL 3.12 MG/1
3.12 TABLET ORAL 2 TIMES DAILY WITH MEALS
Status: DISCONTINUED | OUTPATIENT
Start: 2024-12-30 | End: 2024-12-31

## 2024-12-30 RX ORDER — ACETAMINOPHEN 500 MG
500 TABLET ORAL EVERY 6 HOURS
Status: DISCONTINUED | OUTPATIENT
Start: 2024-12-30 | End: 2025-01-03 | Stop reason: HOSPADM

## 2024-12-30 RX ORDER — KETOROLAC TROMETHAMINE 30 MG/ML
15 INJECTION, SOLUTION INTRAMUSCULAR; INTRAVENOUS ONCE
Status: COMPLETED | OUTPATIENT
Start: 2024-12-30 | End: 2024-12-30

## 2024-12-30 RX ORDER — TRAMADOL HYDROCHLORIDE 50 MG/1
50 TABLET ORAL EVERY 4 HOURS PRN
Status: DISCONTINUED | OUTPATIENT
Start: 2024-12-30 | End: 2025-01-03 | Stop reason: HOSPADM

## 2024-12-30 RX ADMIN — CARVEDILOL 3.12 MG: 3.12 TABLET, FILM COATED ORAL at 09:12

## 2024-12-30 RX ADMIN — ACETAMINOPHEN 500 MG: 500 TABLET ORAL at 11:12

## 2024-12-30 RX ADMIN — HYDROCHLOROTHIAZIDE 12.5 MG: 12.5 TABLET ORAL at 06:12

## 2024-12-30 RX ADMIN — POLYETHYLENE GLYCOL 3350 17 G: 17 POWDER, FOR SOLUTION ORAL at 09:12

## 2024-12-30 RX ADMIN — ENOXAPARIN SODIUM 40 MG: 40 INJECTION SUBCUTANEOUS at 04:12

## 2024-12-30 RX ADMIN — KETOROLAC TROMETHAMINE 15 MG: 30 INJECTION, SOLUTION INTRAMUSCULAR; INTRAVENOUS at 09:12

## 2024-12-30 RX ADMIN — ACETAMINOPHEN 500 MG: 500 TABLET ORAL at 09:12

## 2024-12-30 RX ADMIN — ATORVASTATIN CALCIUM 40 MG: 40 TABLET, FILM COATED ORAL at 09:12

## 2024-12-30 RX ADMIN — LOSARTAN POTASSIUM 100 MG: 50 TABLET, FILM COATED ORAL at 09:12

## 2024-12-30 RX ADMIN — CARVEDILOL 3.12 MG: 3.12 TABLET, FILM COATED ORAL at 05:12

## 2024-12-30 RX ADMIN — FERROUS SULFATE TAB 325 MG (65 MG ELEMENTAL FE) 1 EACH: 325 (65 FE) TAB at 09:12

## 2024-12-30 RX ADMIN — ACETAMINOPHEN 500 MG: 500 TABLET ORAL at 05:12

## 2024-12-30 NOTE — PHARMACY MED REC
"  [facility]           Pharmacy  Admission Medication History     The home medication history was taken by Amirah Hackett PharmD.      Medication history obtained from Medications listed below were obtained from: Patient/family    Based on information gathered for medication list, you may go to "Admission" then "Reconcile Home Medications" tabs to review and/or act upon those items.     The home medication list has been updated by the Pharmacy department.   Please read ALL comments highlighted in yellow.   Please address this information as you see fit.    Feel free to contact us if you have any questions or require assistance.    The current inpatient medication list has been compared to the home medication list and the following discrepancies were noted:      Patient reports he/she IS TAKING the following which was not ordered upon admit  Ferrous Sulfate 325 mg      Potential issues to be addressed PRIOR TO DISCHARGE      No current facility-administered medications on file prior to encounter.     Current Outpatient Medications on File Prior to Encounter   Medication Sig Dispense Refill    atorvastatin (LIPITOR) 40 MG tablet Take 1 tablet by mouth once daily 90 tablet 3    carvediloL (COREG) 3.125 MG tablet Take 1 tablet by mouth twice daily 180 tablet 3    ergocalciferol (ERGOCALCIFEROL) 50,000 unit Cap Take 1 capsule (50,000 Units total) by mouth every 30 days. 3 capsule 3    ferrous sulfate 325 mg (65 mg iron) Tab tablet Take 325 mg by mouth once daily.      hydroCHLOROthiazide (HYDRODIURIL) 12.5 MG Tab TAKE 1 TABLET BY MOUTH ONCE DAILY IN THE MORNING 90 tablet 3    irbesartan (AVAPRO) 300 MG tablet Take 1 tablet by mouth once daily 90 tablet 3       Please address this information as you see fit.  Feel free to contact us if you have any questions or require assistance.    Dakota VillarealD  [phone]               .          "

## 2024-12-30 NOTE — PT/OT/SLP PROGRESS
Physical Therapy Treatment    Patient Name:  Taiwo Boyd   MRN:  [id number]    Recommendations:     Discharge Recommendations: Moderate Intensity Therapy (pending ortho consult)  Discharge Equipment Recommendations: to be determined by next level of care  Barriers to discharge:  Decreased caregiver support, Other (Comment) (therapists awaiting furhter clarification from orthopedics to advance weightbearing)     Assessment:     Taiwo Boyd is a 89 y.o. female admitted with a medical diagnosis of Fracture of ramus of left pubis.  She presents with the following impairments/functional limitations: weakness, impaired self care skills, impaired functional mobility, decreased lower extremity function, impaired balance, pain, edema reported pain decreased during today's session; kept pt NWB LLE pending ortho consult; will cont as tolerated.    Rehab Prognosis: Good; patient would benefit from acute skilled PT services to address these deficits and reach maximum level of function.    Recent Surgery: * No surgery found *      Plan:     During this hospitalization, patient to be seen 5 x/week to address the identified rehab impairments via gait training, therapeutic activities, therapeutic exercises, neuromuscular re-education and progress toward the following goals:    Plan of Care Expires:  01/10/25    Subjective     Chief Complaint: pain with rolling   Patient/Family Comments/goals: pt reports being independent  Pain/Comfort:  Pain Rating 1: 0/10  Pain Addressed 1: Reposition, Pre-medicate for activity, Cessation of Activity, Nurse notified   Pain Rating Post-Intervention 1:  (3-4/10 L hip/groin during transitional movements; 1-2/10 L hip/groin while sitting EOB; not rated at end of session)      Objective:     Communicated with nurse prior to session.  Patient found HOB elevated with bed alarm, PureWick (waffle overlay) upon PT entry to room.     General Precautions: Standard, fall  Orthopedic Precautions:  (awaiting  clarification from ortho; treated conservatively -did not allow wtbearing through LLE)  Braces: N/A  Respiratory Status: Room air     Functional Mobility:  Bed Mobility:     Rolling Left:  maximal assistance  Rolling Right: maximal assistance  Scooting: contact guard assistance and minimal VCs for scooting to EOB; modA to scoot laterally to HOB with education on hand placement/RLE placement to avoid WB LLE  Supine to Sit: moderate assistance, of 2 persons, with RLE weightbearing and LLE management/assistance with increased time with scooting to EOB as above  Sit to Supine: moderate assistance, of 2 persons, and bridging through RLE, followed by x 2 support with draw sheets for upright positioning      AM-PAC 6 CLICK MOBILITY  Turning over in bed (including adjusting bedclothes, sheets and blankets)?: 2  Sitting down on and standing up from a chair with arms (e.g., wheelchair, bedside commode, etc.): 1  Moving from lying on back to sitting on the side of the bed?: 2  Moving to and from a bed to a chair (including a wheelchair)?: 1  Need to walk in hospital room?: 1  Climbing 3-5 steps with a railing?: 1  Basic Mobility Total Score: 8       Treatment & Education:  -pt re-educated in role of PT  -patient 0x4  -cotx with OT for efficacy of outcomes and safdety  -performed mobility training above while awaiting ortho precautions  -instructed in PLB during session and in coordination with movement  -increased time/effort and VCs throughout session with intermittent re-direction to task 2/2 excess talking  -pt educated on placing pillow between knees during rolling to decrease incidence of pain  -demonstrated ability to perform APs, ankle circles and assisted pt with minimal BLE range as tolerated    Patient left HOB elevated with all lines intact, call button in reach, bed alarm on, and nurse notified..    GOALS:   Multidisciplinary Problems       Physical Therapy Goals          Problem: Physical Therapy    Goal Priority  Disciplines Outcome Interventions   Physical Therapy Goal     PT, PT/OT     Description: Goals to be met by: Discharge     Patient will increase functional independence with mobility by performin. Supine to sit with Moderate Assistance   WAITING FOR WB STATUS FROM ORTHO                         Time Tracking:     PT Received On: 24  PT Start Time: 945     PT Stop Time: 1038  PT Total Time (min): 53 min     Billable Minutes: Therapeutic Activity 40       PT/PTA: PT     Number of PTA visits since last PT visit: 0     2024

## 2024-12-30 NOTE — PLAN OF CARE
The sw called the pt's LOCET in to [id number] and faxed the PASRR to [facility].        12/30/24 8364   Post-Acute Status   Post-Acute Authorization Placement   Post-Acute Placement Status Pending state direction/certification   Discharge Plan   Discharge Plan A Home Health   Discharge Plan B Skilled Nursing Facility

## 2024-12-30 NOTE — PLAN OF CARE
The sw met with the pt to complete the assessment but she was working with therapy,so the sw spoke to the pt's dtr Jailene Boyd [phone] via phone to complete the assessment. The pt lives alone in [place] but leads a very active lifestyle. The pt doesn't drive,so Jailene transports her to dr goodrich's and errands. She will transport the pt home at d/c. The pt's independent with her ADL's and uses the dme listed below prn. The sw completed the white board in the pt's room with her name and contact info. The sw left the pt a d/c brochure with her contact info on it. The sw encouraged them to call if they have any further questions or concerns. The sw will continue to follow the pt throughout her transitions of care and will assist with any d/c needs.     [place] - Telemetry  Initial Discharge Assessment       Primary Care Provider: Fernando Lamas MD    Admission Diagnosis: Pubic ramus fracture, left, closed, initial encounter [S32.592A]    Admission Date: 12/28/2024  Expected Discharge Date: 1/1/2025    Transition of Care Barriers: (P) None    Payor: [employer] / Plan: [employer] / Product Type: Medicare Advantage /     Extended Emergency Contact Information  Primary Emergency Contact: Jailene Boyd  Mobile Phone: [phone]  Relation: Daughter  Preferred language: English   needed? No    Discharge Plan A: (P) Home Health  Discharge Plan B: (P) Rehab      [facility]  [address]  [address]  [place] [address]  Phone: [phone] Fax: [phone]      Initial Assessment (most recent)       Adult Discharge Assessment - 12/30/24 1647          Discharge Assessment    Assessment Type Discharge Planning Assessment (P)      Confirmed/corrected address, phone number and insurance Yes (P)      Confirmed Demographics Correct on Facesheet (P)      Source of Information family (P)      When was your last doctors appointment? -- (P)    2 months ago     Communicated PAWAN with patient/caregiver Yes (P)      Reason For Admission Fracture of ramus of left pubis (P)      People in Home alone (P)      Do you expect to return to your current living situation? Yes (P)      Do you have help at home or someone to help you manage your care at home? Yes (P)      Who are your caregiver(s) and their phone number(s)? Jailene Boyd(dtr)[phone] (P)      Prior to hospitilization cognitive status: Alert/Oriented (P)      Current cognitive status: Alert/Oriented (P)      Walking or Climbing Stairs Difficulty yes (P)      Walking or Climbing Stairs ambulation difficulty, requires equipment;stair climbing difficulty, requires equipment;transferring difficulty, requires equipment (P)      Mobility Management pt has a rollator,rw and q cane at home and uses them prn (P)      Dressing/Bathing Difficulty no (P)      Home Accessibility wheelchair accessible (P)      Home Layout Able to live on 1st floor (P)      Equipment Currently Used at Home rollator;walker, rolling;cane, quad (P)    pt uses them prn    Readmission within 30 days? No (P)      Patient currently being followed by outpatient case management? No (P)      Do you currently have service(s) that help you manage your care at home? No (P)      Do you take prescription medications? Yes (P)      Do you have prescription coverage? Yes (P)      Coverage PHN (P)      Do you have any problems affording any of your prescribed medications? No (P)      Is the patient taking medications as prescribed? yes (P)      Who is going to help you get home at discharge? Jailene Boyd(dtr)[phone] (P)      How do you get to doctors appointments? family or friend will provide (P)      Are you on dialysis? No (P)      Do you take coumadin? No (P)      Discharge Plan A Home Health (P)      Discharge Plan B Rehab (P)      DME Needed Upon Discharge  other (see comments) (P)    TBD    Discharge Plan discussed with: Patient;Adult children (P)      Transition  of Care Barriers None (P)         Physical Activity    On average, how many days per week do you engage in moderate to strenuous exercise (like a brisk walk)? Patient unable to answer (P)      On average, how many minutes do you engage in exercise at this level? Patient unable to answer (P)         Financial Resource Strain    How hard is it for you to pay for the very basics like food, housing, medical care, and heating? Patient unable to answer (P)         Housing Stability    In the last 12 months, was there a time when you were not able to pay the mortgage or rent on time? Patient unable to answer (P)      At any time in the past 12 months, were you homeless or living in a shelter (including now)? Patient unable to answer (P)         Transportation Needs    Has the lack of transportation kept you from medical appointments, meetings, work or from getting things needed for daily living? Patient unable to answer (P)         Food Insecurity    Within the past 12 months, you worried that your food would run out before you got the money to buy more. Patient unable to answer (P)      Within the past 12 months, the food you bought just didn't last and you didn't have money to get more. Patient unable to answer (P)         Stress    Do you feel stress - tense, restless, nervous, or anxious, or unable to sleep at night because your mind is troubled all the time - these days? Patient unable to answer (P)         Social Isolation    How often do you feel lonely or isolated from those around you?  Patient unable to answer (P)         Alcohol Use    Q1: How often do you have a drink containing alcohol? Patient unable to answer (P)      Q2: How many drinks containing alcohol do you have on a typical day when you are drinking? Patient unable to answer (P)      Q3: How often do you have six or more drinks on one occasion? Patient unable to answer (P)         Utilities    In the past 12 months has the electric, gas, oil, or water  company threatened to shut off services in your home? Patient unable to answer (P)         Health Literacy    How often do you need to have someone help you when you read instructions, pamphlets, or other written material from your doctor or pharmacy? Patient unable to respond (P)

## 2024-12-30 NOTE — PT/OT/SLP PROGRESS
"Occupational Therapy   Treatment    Name: Taiwo Boyd  MRN: [id number]  Admitting Diagnosis:  Fracture of ramus of left pubis       Recommendations:     Discharge Recommendations: Moderate Intensity Therapy (*pending ortho consult*)  Discharge Equipment Recommendations:  to be determined by next level of care  Barriers to discharge:  Decreased caregiver support, Other (Comment) (therapists awaiting furhter clarification from orthopedics to advance weightbearing)    Assessment:     Taiwo Boyd is a 89 y.o. female with a medical diagnosis of Fracture of ramus of left pubis.  She presents with .The primary encounter diagnosis was Pubic ramus fracture, left, closed, initial encounter. Diagnoses of Closed fracture of ramus of left pubis, initial encounter and Bradycardia were also pertinent to this visit.  . Performance deficits affecting function are weakness, impaired self care skills, impaired functional mobility, decreased lower extremity function, gait instability, pain, edema, impaired balance.     Continue OT POC. Pending ortho consult; treated as NWB LLE at this time. Decreased reported pain this date. Will continue to progress as able.     Rehab Prognosis:  Good; patient would benefit from acute skilled OT services to address these deficits and reach maximum level of function.       Plan:     Patient to be seen 4 x/week to address the above listed problems via self-care/home management, therapeutic activities, therapeutic exercises  Plan of Care Expires: 01/28/25  Plan of Care Reviewed with: patient    Subjective     Chief Complaint: pain during rolling; complaint of current condition  Patient/Family Comments/goals: "I am very independent"   Pain/Comfort:  Pain Rating 1: 0/10  Pain Addressed 1: Reposition, Pre-medicate for activity, Cessation of Activity, Nurse notified  Pain Rating Post-Intervention 1:  (3-4/10 in L hip/groin area during transition movements; 1-2/10 in L hip/groin area while sitting eob; not " rated at end of session)    Objective:     Communicated with: nursing prior to session.  Patient found HOB elevated with bed alarm, Other (comments), PureWick (waffle overlay) upon OT entry to room.    General Precautions: Standard, fall    Orthopedic Precautions: (awaiting clarification from ortho (thus treated conservatively / and did not allow patient to weightbear through LLE))  Braces: N/A  Respiratory Status: Room air     Occupational Performance:     Bed Mobility:    Patient completed Rolling/Turning to Left with  maximal assistance  Patient completed Rolling/Turning to Right with maximal assistance  Patient completed Supine to Sit with mod assist x 2 via guided method of R LE weightbearing, with LLE management/ assist, scooting to eob with increased time, CGA, and min verbal cues for technique  Patient completed Sit to Supine with mod assist x2, scooting up in bed via assisted bridging through RLE, followed by x2 support with draw sheets for upright positioning      Functional Mobility/Transfers:  Functional Mobility: eob sitting SBA; lateral seated side scoots through RLE - moderate assist (OT behind patient providing tactile cues to hips, PT in front of patient managing LLE)    Activities of Daily Living:  Grooming: set up; sitting eob  Toileting: maximal assistance /total assist; bed level; assisted hygiene - instructed for UTI prevention (as patient with improper sequencing initially), guided for flexing R knee to ease reach  Lower body dressing: total assist socks; terminated task performance due to quick attempt for forward trunk excessive flexion; additional training needed      [facility] 6 Click ADL: 17    Treatment & Education:  Patient  re-educated on role of OT.  Patient oriented x 4  Cotx with PT for safety  /efficacy.  ADL / functional mobility  training as above; awaiting ortho increased precautions thus deferred out of bed efforts.  No reports of dizziness.  Instructed repetitively on breathing  with activity.  Extended time and effort during session.  Redirection provided intermittently during session due to excess talking.  Expresses feeling good to be able to sit on the side of the bed.  Hand placement / RLE technique cues provided for lateral seated scoots.  Educated on method of pillow between knees when rolling / bed level toileting to mitigate discomfort.  Answered inquires in scope.                Patient left HOB elevated with all lines intact, call button in reach, bed alarm on, and nursing notified  Cna notified and educated to use pillow between BLE when assisting patient in further bed level toileting needs    GOALS:   Multidisciplinary Problems       Occupational Therapy Goals          Problem: Occupational Therapy    Goal Priority Disciplines Outcome Interventions   Occupational Therapy Goal     OT, PT/OT Progressing    Description: Goals to be met by: 01/28/25     Patient will increase functional independence with ADLs by performing:    UE Dressing with Modified [place].  LE Dressing with Modified [place].  Grooming while standing at sink with Modified [place].  Toileting from toilet with Modified [place] for hygiene and clothing management.   Toilet transfer to toilet with Modified [place].                         Time Tracking:     OT Date of Treatment: 12/30/24  OT Start Time: 0945  OT Stop Time: 1038  OT Total Time (min): 53 min total time    Billable Minutes:Self Care/Home Management 25 min  Therapeutic Activity 15 min    OT/RASHEED: OT          12/30/2024

## 2024-12-30 NOTE — PLAN OF CARE
Problem: Adult Inpatient Plan of Care  Goal: Plan of Care Review  Outcome: Progressing  Goal: Patient-Specific Goal (Individualized)  Outcome: Progressing  Goal: Absence of Hospital-Acquired Illness or Injury  Outcome: Progressing  Goal: Optimal Comfort and Wellbeing  Outcome: Progressing     Problem: Orthopaedic Fracture  Goal: Optimal Functional Ability  Outcome: Progressing  Goal: Absence of Infection Signs and Symptoms  Outcome: Progressing  Goal: Optimal Pain Control and Function  Outcome: Progressing     Problem: Fall Injury Risk  Goal: Absence of Fall and Fall-Related Injury  Outcome: Progressing     POC reviewed with patient. All questions and concerns addressed. Fall/safety precautions implemented and maintained. Purewick care performed. No acute events noted this shift. Please see flowsheet for full assessment and vitals. Bed locked in lowest position. Side rails up x2. Call bell within reach.

## 2024-12-30 NOTE — PROGRESS NOTES
[facility] Medicine Progress Note    Primary Team: hospitals Hospitalist Team B  Attending Physician: Darvin Cr MD   Resident: Keith Wright MD  Intern: Helen Encarnacion MD    Subjective/Interval History     Pain controlled w tylenol. No complaints this morning. PT/OT on board. Reached out to ortho to see if they want to see her inpatient.      Objective   Last 24 Hour Vital Signs:  BP  Min: 106/64  Max: 144/71  Temp  Av.4 °F (36.9 °C)  Min: 97.7 °F (36.5 °C)  Max: 99.3 °F (37.4 °C)  Pulse  Av.6  Min: 54  Max: 63  Resp  Av.4  Min: 18  Max: 20  SpO2  Av.8 %  Min: 95 %  Max: 97 %  I/O last 3 completed shifts:  In: 839 [P.O.:839]  Out: 1225 [Urine:1225]    Physical Examination:  General: Sitting up in bed in NAD  CV: Normal rate. Regular rhythm.  Pulm: CTAB. Normal work of breathing on RA   Abd: Soft, non tender, non distended, normoactive bowel sounds.   Msk: L hip tenderness and decreased ROM   Extremities: atraumatic, no deformities, no edema.  Skin: Dry, warm, intact. No bruising or rashes.  Neuro: AAOx4, no focal deficits     Labs, Micro, and Imaging:  I have independently reviewed data.     Assessment & Plan     Taiwo Boyd is a 89 y.o. female admitted for L pubic rami fractures after a mechanical fall. Ortho rec conservative treatment and weight bearing to tolerance. Working with PT/OT and pain controlled w tylenol.     L pubic rami fractures  - Imaging showed mildly displaced fracture of L superior and inferior pubic rami  - E-consult placed to Ortho in ED on arrival - recommended conservative care and ortho eval Monday once on site coverage available   - Reached out to ortho this morning, waiting to hear back   - Continue PT/OT  - Weight bearing to tolerance  - Pain controlled w Tylenol   - Outpatient ortho follow up     Hypertension  - Home meds: Coreg 3.125 mg BID, HCTZ 12.5 mg, Irbesartan 300 mg daily  - Continue Coreg and hctz - On Losartan while inpatient (irbesartan not on  formulary)      Hyperlipidemia  - Continue Lipitor 40 mg nightly    Code Status: Full  DVT Prophylaxis: Lovenox   Diet: Cardiac   Disposition: Working with PT. Reaching out to ortho to see if they plan to see her inpatient.     Case will be discussed with attending physician and attestation to follow.    Helen Encarnacion MD  [facility] Internal Medicine Resident, PGY-I  [facility] Hospitalist Team B  [phone]    [facility] Medicine Hospitalist Pager numbers:   [facility] Hospitalist Medicine Team A (Maury/Bart): [phone]  [facility] Hospitalist Medicine Team B (Kunal/Shaye):  [phone]

## 2024-12-31 LAB
ALBUMIN SERPL BCP-MCNC: 2.8 G/DL (ref 3.5–5.2)
ALP SERPL-CCNC: 59 U/L (ref 40–150)
ALT SERPL W/O P-5'-P-CCNC: 15 U/L (ref 10–44)
ANION GAP SERPL CALC-SCNC: 9 MMOL/L (ref 8–16)
AST SERPL-CCNC: 14 U/L (ref 10–40)
BASOPHILS # BLD AUTO: 0.01 K/UL (ref 0–0.2)
BASOPHILS NFR BLD: 0.2 % (ref 0–1.9)
BILIRUB SERPL-MCNC: 0.4 MG/DL (ref 0.1–1)
BUN SERPL-MCNC: 47 MG/DL (ref 8–23)
CALCIUM SERPL-MCNC: 9.3 MG/DL (ref 8.7–10.5)
CHLORIDE SERPL-SCNC: 109 MMOL/L (ref 95–110)
CO2 SERPL-SCNC: 23 MMOL/L (ref 23–29)
CREAT SERPL-MCNC: 0.9 MG/DL (ref 0.5–1.4)
DIFFERENTIAL METHOD BLD: ABNORMAL
EOSINOPHIL # BLD AUTO: 0.3 K/UL (ref 0–0.5)
EOSINOPHIL NFR BLD: 5.2 % (ref 0–8)
ERYTHROCYTE [DISTWIDTH] IN BLOOD BY AUTOMATED COUNT: 13.5 % (ref 11.5–14.5)
EST. GFR  (NO RACE VARIABLE): >60 ML/MIN/1.73 M^2
GLUCOSE SERPL-MCNC: 86 MG/DL (ref 70–110)
HCT VFR BLD AUTO: 30.1 % (ref 37–48.5)
HGB BLD-MCNC: 9.9 G/DL (ref 12–16)
IMM GRANULOCYTES # BLD AUTO: 0.01 K/UL (ref 0–0.04)
IMM GRANULOCYTES NFR BLD AUTO: 0.2 % (ref 0–0.5)
LYMPHOCYTES # BLD AUTO: 1.7 K/UL (ref 1–4.8)
LYMPHOCYTES NFR BLD: 30 % (ref 18–48)
MAGNESIUM SERPL-MCNC: 2 MG/DL (ref 1.6–2.6)
MCH RBC QN AUTO: 29.7 PG (ref 27–31)
MCHC RBC AUTO-ENTMCNC: 32.9 G/DL (ref 32–36)
MCV RBC AUTO: 90 FL (ref 82–98)
MONOCYTES # BLD AUTO: 0.7 K/UL (ref 0.3–1)
MONOCYTES NFR BLD: 12.4 % (ref 4–15)
NEUTROPHILS # BLD AUTO: 2.9 K/UL (ref 1.8–7.7)
NEUTROPHILS NFR BLD: 52 % (ref 38–73)
NRBC BLD-RTO: 0 /100 WBC
PHOSPHATE SERPL-MCNC: 2.7 MG/DL (ref 2.7–4.5)
PLATELET # BLD AUTO: 169 K/UL (ref 150–450)
PMV BLD AUTO: 11.7 FL (ref 9.2–12.9)
POTASSIUM SERPL-SCNC: 4.6 MMOL/L (ref 3.5–5.1)
PROT SERPL-MCNC: 5.4 G/DL (ref 6–8.4)
RBC # BLD AUTO: 3.33 M/UL (ref 4–5.4)
SODIUM SERPL-SCNC: 141 MMOL/L (ref 136–145)
WBC # BLD AUTO: 5.63 K/UL (ref 3.9–12.7)

## 2024-12-31 PROCEDURE — 97535 SELF CARE MNGMENT TRAINING: CPT

## 2024-12-31 PROCEDURE — 84100 ASSAY OF PHOSPHORUS: CPT

## 2024-12-31 PROCEDURE — 97530 THERAPEUTIC ACTIVITIES: CPT

## 2024-12-31 PROCEDURE — 85025 COMPLETE CBC W/AUTO DIFF WBC: CPT

## 2024-12-31 PROCEDURE — 25000003 PHARM REV CODE 250

## 2024-12-31 PROCEDURE — 83735 ASSAY OF MAGNESIUM: CPT

## 2024-12-31 PROCEDURE — 63600175 PHARM REV CODE 636 W HCPCS

## 2024-12-31 PROCEDURE — 36415 COLL VENOUS BLD VENIPUNCTURE: CPT

## 2024-12-31 PROCEDURE — 97116 GAIT TRAINING THERAPY: CPT

## 2024-12-31 PROCEDURE — 80053 COMPREHEN METABOLIC PANEL: CPT

## 2024-12-31 PROCEDURE — 25000003 PHARM REV CODE 250: Performed by: ORTHOPAEDIC SURGERY

## 2024-12-31 PROCEDURE — 11000001 HC ACUTE MED/SURG PRIVATE ROOM

## 2024-12-31 PROCEDURE — 97110 THERAPEUTIC EXERCISES: CPT

## 2024-12-31 RX ADMIN — LOSARTAN POTASSIUM 100 MG: 50 TABLET, FILM COATED ORAL at 09:12

## 2024-12-31 RX ADMIN — ENOXAPARIN SODIUM 40 MG: 40 INJECTION SUBCUTANEOUS at 05:12

## 2024-12-31 RX ADMIN — TRAMADOL HYDROCHLORIDE 50 MG: 50 TABLET, COATED ORAL at 01:12

## 2024-12-31 RX ADMIN — HYDROCHLOROTHIAZIDE 12.5 MG: 12.5 TABLET ORAL at 09:12

## 2024-12-31 RX ADMIN — ATORVASTATIN CALCIUM 40 MG: 40 TABLET, FILM COATED ORAL at 09:12

## 2024-12-31 RX ADMIN — FERROUS SULFATE TAB 325 MG (65 MG ELEMENTAL FE) 1 EACH: 325 (65 FE) TAB at 10:12

## 2024-12-31 NOTE — PROGRESS NOTES
[facility] Hospital Medicine Progress Note    Primary Team: [facility] Hospitalist Team B  Attending Physician: Darvin Cr MD   Resident: Keith Wright MD  Intern: Helen Encarnacion MD    Subjective/Interval History     Pain controlled w tylenol. No complaints this morning. PT/OT on board. Ortho to follow up outpatient. Pending SNF.     Objective   Last 24 Hour Vital Signs:  BP  Min: 96/55  Max: 144/71  Temp  Av.2 °F (36.8 °C)  Min: 97.7 °F (36.5 °C)  Max: 98.7 °F (37.1 °C)  Pulse  Av.7  Min: 49  Max: 58  Resp  Av  Min: 16  Max: 20  SpO2  Av.8 %  Min: 95 %  Max: 97 %  I/O last 3 completed shifts:  In: 365 [P.O.:365]  Out: 875 [Urine:875]    Physical Examination:  General: Sitting up in bed in NAD  CV: Normal rate. Regular rhythm.  Pulm: CTAB. Normal work of breathing on RA   Abd: Soft, non tender, non distended, normoactive bowel sounds.   Msk: L hip tenderness and decreased ROM   Extremities: atraumatic, no deformities, no edema.  Skin: Dry, warm, intact. No bruising or rashes.  Neuro: AAOx4, no focal deficits     Labs, Micro, and Imaging:  I have independently reviewed data.     Assessment & Plan     Taiwo Boyd is a 89 y.o. female admitted for L pubic rami fractures after a mechanical fall. Ortho rec conservative treatment and weight bearing to tolerance. Working with PT/OT and pain controlled w tylenol.     L pubic rami fractures  - Imaging showed mildly displaced fracture of L superior and inferior pubic rami  - E-consult placed to Ortho in ED on arrival - recommended conservative care and ortho eval Monday once on site coverage available   - Reached out to ortho this morning, waiting to hear back   - Continue PT/OT  - Weight bearing to tolerance  - Pain controlled w Tylenol   - Outpatient ortho follow up     Hypertension  - Home meds: Coreg 3.125 mg BID, HCTZ 12.5 mg, Irbesartan 300 mg daily  - Stopped coreg and HCTZ  - Continue losartan while inpatient (irbesartan not on formulary)       Hyperlipidemia  - Continue Lipitor 40 mg nightly    Code Status: Full  DVT Prophylaxis: Lovenox   Diet: Cardiac   Disposition: pending SNF acceptance    Case will be discussed with attending physician and attestation to follow.    Manjit Omalley DO  [facility] Internal [facility] PGY-2  [facility] Internal Medicine Team B    [facility] Medicine Hospitalist Pager numbers:   [facility] Hospitalist Medicine Team A (Maury/Bart): [phone]  [facility] Hospitalist Medicine Team B (Kunal/Shaye):  [phone]

## 2024-12-31 NOTE — PLAN OF CARE
Problem: Adult Inpatient Plan of Care  Goal: Plan of Care Review  12/31/2024 0723 by Onur Champion RN  Outcome: Progressing  VN note:   Patient's chart, labs and vital signs reviewed, will be available to intervene if needed.

## 2024-12-31 NOTE — CONSULTS
[facility] Ortho Consult Note     Chief Complaint:  left superior/inferior pubic rami fractures     HPI:  89yo F admitted to [facility] s/p ground level fall with left minimally displaced superior/inferior pubic rami fractures. Orthopaedics was consulted.     Patient denies antecedent pain. She states her fall was mechanical in nature, she tripped over her slipper going to the bathroom. She localizes her pain to her left hemipelvis.  She states she does not have pain in any other extremity. She denies paresthesias or weakness.    Lives alone, has several children who live nearby.       PMH:    Past Medical History:   Diagnosis Date    Anemia in stage 3 chronic kidney disease 4/19/2018    CKD (chronic kidney disease) stage 3, GFR 30-59 ml/min     Disorder of kidney and ureter     Hypertension     Hyponatremia     Secondary hyperparathyroidism      PSH:    Past Surgical History:   Procedure Laterality Date    APPENDECTOMY      HYSTERECTOMY      TONSILLECTOMY       FH:  Noncontributory  SH:   Social Drivers of Health     Tobacco Use: Low Risk  (12/28/2024)    Patient History     Smoking Tobacco Use: Never     Smokeless Tobacco Use: Never     Passive Exposure: Not on file   Alcohol Use: Patient Unable To Answer (12/30/2024)    AUDIT-C     Frequency of Alcohol Consumption: Patient unable to answer     Average Number of Drinks: Patient unable to answer     Frequency of Binge Drinking: Patient unable to answer   Financial Resource Strain: Patient Unable To Answer (12/30/2024)    Overall Financial Resource Strain (CARDIA)     Difficulty of Paying Living Expenses: Patient unable to answer   Food Insecurity: Patient Unable To Answer (12/30/2024)    Hunger Vital Sign     Worried About Running Out of Food in the Last Year: Patient unable to answer     Ran Out of Food in the Last Year: Patient unable to answer   Transportation Needs: Patient Unable To Answer (12/30/2024)    TRANSPORTATION NEEDS     Transportation : Patient unable to answer    Physical Activity: Patient Unable To Answer (12/30/2024)    Exercise Vital Sign     Days of Exercise per Week: Patient unable to answer     Minutes of Exercise per Session: Patient unable to answer   Stress: Patient Unable To Answer (12/30/2024)    [employer] - Occupational Stress Questionnaire     Feeling of Stress : Patient unable to answer   Housing Stability: Patient Unable To Answer (12/30/2024)    Housing Stability Vital Sign     Unable to Pay for Housing in the Last Year: Patient unable to answer     Homeless in the Last Year: Patient unable to answer   Depression: Low Risk  (10/14/2024)    Depression     Last PHQ-4: Flowsheet Data: 0   Utilities: Patient Unable To Answer (12/30/2024)    [facility] Utilities     Threatened with loss of utilities: Patient unable to answer   Health Literacy: Patient Unable To Answer (12/30/2024)     Health Literacy     Frequency of need for help with medical instructions: Patient unable to respond   Social Isolation: Patient Unable To Answer (12/30/2024)    Social Isolation     Social Isolation: 6         Meds:    No current facility-administered medications on file prior to encounter.     Current Outpatient Medications on File Prior to Encounter   Medication Sig Dispense Refill    atorvastatin (LIPITOR) 40 MG tablet Take 1 tablet by mouth once daily 90 tablet 3    carvediloL (COREG) 3.125 MG tablet Take 1 tablet by mouth twice daily 180 tablet 3    ergocalciferol (ERGOCALCIFEROL) 50,000 unit Cap Take 1 capsule (50,000 Units total) by mouth every 30 days. 3 capsule 3    ferrous sulfate 325 mg (65 mg iron) Tab tablet Take 325 mg by mouth once daily.      hydroCHLOROthiazide (HYDRODIURIL) 12.5 MG Tab TAKE 1 TABLET BY MOUTH ONCE DAILY IN THE MORNING 90 tablet 3    irbesartan (AVAPRO) 300 MG tablet Take 1 tablet by mouth once daily 90 tablet 3       Allergies:    Review of patient's allergies indicates:   Allergen Reactions    Codeine phosphate Rash  "and Other (See Comments)        ROS:  otherwise negative except indicated in HPI      Exam:  Vitals:  /66 (BP Location: Right arm, Patient Position: Lying)   Pulse (!) 53   Temp 98.1 °F (36.7 °C) (Oral)   Resp 18   Ht 5' 3" (1.6 m)   Wt 67.5 kg (148 lb 13 oz)   SpO2 96%   BMI 26.36 kg/m²   Gen:  Awake and alert, NAD  Resp: No increased WOB        BUE:  No abrasions or open wounds  No significant swelling or bruising  NonTTP about shoulder or humerus  Full AROM shoulder  NonTTP shoulder, elbow, forearm, wrist, hand  Normal ROM elbow and wrist without pain  5/5 AIN/PIN/U, , biceps, triceps  SILT M/R/U  2+ RP    LLE:  No obvious deformity  No open wounds or abrasions  No significant swelling or bruising  Nontender over GT  Mild discomfort with lateral compression/palpation of left hemipelvis  NonTTP thigh, knee, leg, ankle, foot  Able to tolerate full hip extension, flexion to 90 without pain, no discomfort with internal/external rotation  ROM knee, ankle  TA/gastroc/EHL/FHL intact with 5/5 strength  SILT grossly  2+ DP pulse     Labs:  Recent Labs   Lab 12/29/24  0109 12/29/24  0200 12/29/24  0346 12/30/24  0309   WBC  --  7.71 6.93 5.96   HGB  --  10.0* 10.1* 10.1*   HCT  --  30.0* 30.9* 30.1*   PLT  --  168 175 168   MCV  --  90 92 90   RDW  --  13.2 13.4 13.2     --  138 137   K 4.4  --  3.9 4.1     --  108 106   CO2 21*  --  21* 23   BUN 45*  --  43* 37*   CREATININE 0.9  --  0.8 0.9   GLU 90  --  91 105   PROT 6.6  --   --   --    ALBUMIN 3.8  --   --   --    BILITOT 0.5  --   --   --    AST 21  --   --   --    ALKPHOS 73  --   --   --    ALT 22  --   --   --        Imaging:  Imaging Results              CT Hip Without Contrast Left (Final result)  Result time 12/28/24 23:56:50      Final result by Gucci Li,  (12/28/24 23:56:50)                   Impression:      1. Mildly displaced fractures of the left superior and inferior pubic rami.  2. Suspected nondisplaced S5 " anterior cortex fracture.  3. Small amount of hemorrhage in the left anterior pelvis.      Electronically signed by: Gucci Li  Date:    12/28/2024  Time:    23:56               Narrative:    EXAMINATION:  CT HIP WITHOUT CONTRAST LEFT    CLINICAL HISTORY:  Fracture, hip;    TECHNIQUE:  Axial CT images of the left hip with sagittal and coronal reformats without intravenous contrast.    COMPARISON:  Radiographs from earlier the same date.    FINDINGS:  There is diffuse osteopenia.  There is a comminuted, mildly displaced fracture of the left superior pubic ramus.  There is a mildly displaced fracture of the left inferior pubic ramus.  There is a slight cortical irregularity of the 5th sacral segment, anterior cortex, suspicious for a nondisplaced fracture no additional fractures are seen.  There is mild joint space narrowing of the left femoroacetabular joint.  There is moderate joint space narrowing of the pubic symphysis with adjacent subchondral sclerosis and cystic changes.  There is no evidence of a joint effusion.  There is hemorrhage in the left anterior pelvis, adjacent to the superior pubic ramus fracture.  The hemorrhage abuts the left lateral wall of the urinary bladder.  The uterus is absent.  There is colonic diverticulosis without acute diverticulitis.  There is a moderate volume of stool in the rectum.                                       X-Ray Hip 2 or 3 views Left with Pelvis when performed (Final result)  Result time 12/28/24 21:40:15      Final result by Gucci Li DO (12/28/24 21:40:15)                   Impression:      Mildly displaced fractures of the left superior and inferior pubic rami.      Electronically signed by: Gucci Li  Date:    12/28/2024  Time:    21:40               Narrative:    EXAMINATION:  XR HIP WITH PELVIS WHEN PERFORMED 2 OR 3 VIEWS LEFT    CLINICAL HISTORY:  fall with pain;    TECHNIQUE:  AP view of the pelvis and frog leg lateral view of the left hip were  performed.    COMPARISON:  None    FINDINGS:  There is diffuse osteopenia.  There are mildly displaced fractures of the left superior and inferior pubic rami.  No additional fractures are seen.  The femoral heads are well seated in the acetabula.                                         Assessment/Plan:  88yo F with left superior/inferior pubic rami fractures s/p ground level fall on 12/28.       - No operative orthopedic intervention necessary  - Recommend multimodal pain control  - PT/OT for mobilization, patient may require placement in IPR/SNF given that she lives alone and does have deconditioning  - Recommend nutritional optimization (vitamin d, protein supplementation)  - Please place referral for outpatient orthopedic followup         Dispo:  Pending, PT/OT, mobilization. Outpatient clinic followup    Orthopedics will sign off, please contact our team with further questions/concerns  Care discussed with Dr. Adam Mcneill, PGY-5

## 2024-12-31 NOTE — PROGRESS NOTES
"[facility]  Orthopedics  Progress Note    Patient Name: Taiwo Boyd  MRN: [id number]  Admission Date: 12/28/2024  Hospital Length of Stay: 1 days  Attending Provider: Darvin Cr MD  Primary Care Provider: Fernando Lamas MD    Subjective:     Principal Problem:Fracture of ramus of left pubis    Principal Orthopedic Problem:  Left pubic ramus fracture    Interval History:  Patient had fall several days ago now pain left hip  CT scan consistent with left superior and inferior pubic ramus fractures    Review of patient's allergies indicates:   Allergen Reactions    Codeine phosphate Rash and Other (See Comments)       Current Facility-Administered Medications   Medication    acetaminophen tablet 500 mg    atorvastatin tablet 40 mg    carvediloL tablet 3.125 mg    dextrose 50% injection 12.5 g    dextrose 50% injection 25 g    enoxaparin injection 40 mg    ferrous sulfate tablet 1 each    glucagon (human recombinant) injection 1 mg    glucose chewable tablet 16 g    glucose chewable tablet 24 g    hydroCHLOROthiazide tablet 12.5 mg    ketorolac injection 15 mg    losartan tablet 100 mg    naloxone 0.4 mg/mL injection 0.02 mg    polyethylene glycol packet 17 g    sodium chloride 0.9% flush 10 mL    traMADoL tablet 50 mg     Objective:     Vital Signs (Most Recent):  Temp: 98.1 °F (36.7 °C) (12/30/24 1608)  Pulse: (!) 53 (12/30/24 1608)  Resp: 18 (12/30/24 1608)  BP: 124/66 (12/30/24 1608)  SpO2: 96 % (12/30/24 1608) Vital Signs (24h Range):  Temp:  [97.9 °F (36.6 °C)-99.3 °F (37.4 °C)] 98.1 °F (36.7 °C)  Pulse:  [53-63] 53  Resp:  [18-20] 18  SpO2:  [95 %-97 %] 96 %  BP: ()/(55-71) 124/66     Weight: 67.5 kg (148 lb 13 oz)  Height: 5' 3" (160 cm)  Body mass index is 26.36 kg/m².      Intake/Output Summary (Last 24 hours) at 12/30/2024 1836  Last data filed at 12/30/2024 0618  Gross per 24 hour   Intake 240 ml   Output 325 ml   Net -85 ml       Ortho/SPM Exam there is some tenderness left " groin   Range of motion left hip slightly decreased due to pain   Neurologic exam intact    Significant Labs: All pertinent labs within the past 24 hours have been reviewed.    Significant Imaging: I have reviewed and interpreted all pertinent imaging results/findings.    Assessment/Plan:     Active Diagnoses:    Diagnosis Date Noted POA    PRINCIPAL PROBLEM:  Fracture of ramus of left pubis [S32.592A] 12/29/2024 Yes      Problems Resolved During this Admission:     Impression:  Left superior and inferior pubic ramus fractures minimally displaced.      Plan:  Mobilize with physical therapy   Pain control   Consider skilled nursing evaluation      Williams Manzano Jr, MD  Orthopedics  [place] [facility]

## 2024-12-31 NOTE — PLAN OF CARE
The sw met with the pt and her dtr Martha who was at bedside. The sw gave them a snf list from [facility] with the quality star rating to choose 3 in order of preference. The pt and her family will review the list and call the sw with her preferences.     1:00pm The sw received a call from the pt stating her preferences for snf in order are [facility] and [facility]. The sw faxed the pt's snf c/s to Julia via route in [employer]. The sw left Julia a voice message informing her of this info as well. The sw sent the pt's info to the snf's listed above via EPIC. The sw sent a chat to Elzbieta and Manju with [facility] snf to notify them of the referral.        12/31/24 0946   Post-Acute Status   Post-Acute Authorization Placement   Post-Acute Placement Status Patient List Provided   Discharge Plan   Discharge Plan A Skilled Nursing Facility   Discharge Plan B Other  (TBD)

## 2024-12-31 NOTE — PLAN OF CARE
Problem: Adult Inpatient Plan of Care  Goal: Plan of Care Review  Outcome: Progressing  Goal: Absence of Hospital-Acquired Illness or Injury  Outcome: Progressing     Problem: Orthopaedic Fracture  Goal: Absence of Bleeding  Outcome: Progressing  Goal: Fracture Stability  Outcome: Progressing  Goal: Optimal Pain Control and Function  Outcome: Progressing     Problem: Fall Injury Risk  Goal: Absence of Fall and Fall-Related Injury  Outcome: Progressing     Problem: Skin Injury Risk Increased  Goal: Skin Health and Integrity  Outcome: Progressing

## 2024-12-31 NOTE — PROGRESS NOTES
Inpatient consult to Social Work [[id number]] (Order [id number])  Consult  Date and Time: 2024  1:16 PM Department: [facility] Telemetry Unit Rel By/Authorizing: Ledy Singh MD (auto-released)     Order Information    Order Date/Time Release Date/Time Start Date/Time End Date/Time   24 01:16 PM 24 01:16 PM 24 01:17 PM 24 01:17 PM     Order Details    Frequency Duration Priority Order Class   Once 1  occurrence [facility] Hospital Performed     Original Order    Ordered On Ordered By    2024  1:16 PM Ledy Singh MD               Inpatient consult to Social Work: Patient Communication     Not Released  Not seen     Order Questions    Question Answer   Reason for Consult SNF                      Collection Information          Consult Order Info    ID Description Priority Start Date Start Time   [id number] Inpatient consult to Social Work Routine 2024  1:17 PM   Provider Specialty Referred to   ______________________________________ _____________________________________                             Patient Information    Patient Name  Taiwo Boyd Legal Sex  Female   1935 [facility]         Additional Information    Associated Reports   View Parent Encounter   Priority and Order Details         Order Provider Info        Office phone Pager E-mail   Ordering User  Ledy Singh MD  [phone] -- [email or web address]   Authorizing Provider  Ledy Singh MD  [phone] -- [email or web address]   Attending Provider  Darvin Cr MD  [phone] -- [email or web address]       Inpatient consult to Social Work [[id number]]    Electronically signed by: Ledy Singh MD on 24 Status: Active   Ordering user: Ledy Singh MD 24 Ordering provider: Ledy Singh MD   Authorized by: Ledy Singh MD Ordering mode: Standard     Questionnaire    Question Answer   Reason for Consult SNF

## 2024-12-31 NOTE — PROGRESS NOTES
Patient Demographics    Patient Name  Taiwo Boyd Legal Sex  Female          Age  1935 (89 y.o.) [facility]   Address  [address] [place] [address] Phone  [phone] (Home) *Preferred*  [phone] (Mobile)     Patient Demographics    Address  [address] [place] [address] Phone  [phone] (Home) *Preferred*  [phone] (Mobile) E-mail Address  [email or web address]     PCP and Center    Primary Care Provider  Fernando Lamas MD Phone  [phone] Center  [facility]     Emergency Contacts    None on File     Other Contacts    Name Relation Home Work Mobile   Jailene Boyd Daughter   [phone]     Documents on File     Status Date Received Description   Documents for the Patient   Consent Form Not Received 10/09/17    Disability Form Not Received 10/09/17    Authorization to Release Protected Health Information Not Received 10/09/17    Other Not Received 10/09/17    HIPAA Notice of Privacy Not Received 10/09/17    Patient ID Not Received 10/09/17    Advance Directives and Living Will Not Received     Insurance Documents Received 10/12/17 PHN   Patient ID Received 10/12/17 exp 2018   Miscellaneous Documents clinic   Acumen lov with Dr Bradley   Insurance Documents Received 18 PHN CHOICE #14   Insurance Documents Received 19 phn choice 65 #65 hmo   Insurance Documents   medicare health insurance   Lab Other Clinic   Labs from Dr Lamas   Miscellaneous Documents clinic   bp readingsapril 19 thur may 23 2019   Miscellaneous Documents clinic   Home B/P readings   Insurance Documents Received 20 [employer]   Miscellaneous Documents clinic   Vitamin D2 take 1 cap po once a  week 3 reills  qty #12   Notice of Privacy Pract Ackn Signed 10/01/20    Advance Directive Acknowledgement      Provider Based Acknowledgement      Plain Language Summary English Acknowledged () 10/01/20    Clinic Authorization Signed () 10/01/20    [facility]  Contracted Facility Disclosure Signed 10/01/20    Insurance Documents Received 10/01/20 [employer]   Miscellaneous Documents clinic Received 20 medicare wellness survey    Plain Language Summary English Acknowledged () 03/15/21    Insurance Card Pt ID Received 21 Insurance card    Miscellaneous Documents clinic Received 21 Medicare Wellness Survey    Plain Language Summary English Acknowledged () 21    Plain Language Summary English Acknowledged () 21    Insurance Documents Received 10/05/21 [employer] $30 copay   Clinic Authorization Signed () 21    Plain Language Summary English Acknowledged () 21    Miscellaneous Documents clinic Received 21 Home B/P readings   Clinic Authorization Signed () 21    Notice of Privacy Pract Ackn Signed 21    Plain Language Summary English Acknowledged () 22    Patient ID Received 22    Insurance Documents Received 22    HIPAA Notice of Privacy Received 22    Clinic Authorization Received () 22    Miscellaneous Documents clinic Received 22 I.D   Miscellaneous Documents clinic Received 22 Insurance card $25   Miscellaneous Documents clinic Received 22 Clinic Auth   Miscellaneous Documents clinic Received 22 HIPAA   Insurance Card Pt ID Received 22 Insurance card    Miscellaneous Documents clinic Received 22 Medicare Wellness Survey    Plain Language Summary English Acknowledged () 22    Plain Language Summary English Acknowledged () 22    Plain Language Summary English Acknowledged () 22    Miscellaneous Documents clinic Received 22 Immunization consent   Plain Language Summary English Acknowledged () 22    Clinic Authorization Unable to Obtain () 22    Insurance Card Pt ID Received 23 Insurance card    Clinic  Authorization Signed () 23    Plain Language Summary English Acknowledged () 23    Coverage Attachment Received () 23    Plain Language Summary English Acknowledged () 23    Insurance Documents Received () 23    Miscellaneous Documents clinic Received 23 NO SHOW FEE   Miscellaneous Documents clinic Received 23 Medicare Wellness Survey    Miscellaneous Documents clinic Received 23 Medicare Wellness Survey    Consents      Plain Language Summary English      [facility] Not Contracted Facility Disclosure      Clinic Authorization Unable to Obtain () 24    Plain Language Summary English Acknowledged () 24    [facility] Not Contracted Facility Disclosure Unable to Obtain 24    Insurance Card Pt ID Received 24 Insurance card    Clinic Authorization Signed 24    Plain Language Summary English Acknowledged () 24    Insurance Documents Received 06/10/24    Patient ID Received 06/10/24    HIPAA Notice of Privacy Received 06/10/24 Consent to Treat   HIPAA Notice of Privacy Received 06/10/24 HIPAA   Patient Rights and Responsibilities      Miscellaneous Documents clinic Received 07/15/24 Eye clearnce   Miscellaneous Documents clinic Received 10/15/24 Medicare Wellness Survey    Plain Language Summary English Acknowledged () 24    Notice of Nondiscrimination and Accessibility Signed 24    Patient Photo   Photo of Patient   Notice of Privacy Pract Anette  (Deleted)     Rx Prescription      Documents for the Encounter   Medicare Lifetime Reserve Form      Important Medicare Message Printed at Discharge      Advance Beneficiary Notice      Hospital Authorization      Hospital Authorization Signed 24    Clinical References Attachment   Pelvic Fracture Discharge Instructions (English)   Important Medicare Message Received 24    Continuing Care Options Received   [facility]illed [facility]   Hospital Authorization  (Deleted)     DICOM Study      DICOM Series      DICOM Image      DICOM Series      DICOM Image      DICOM Series      DICOM Image      DICOM Study      DICOM Series      DICOM Image      DICOM Series      DICOM Image      DICOM Study  (Deleted)     DICOM Series  (Deleted)     DICOM Image  (Deleted)     DICOM Series  (Deleted)     DICOM Image  (Deleted)     DICOM Series  (Deleted)     DICOM Image  (Deleted)     DICOM Study      DICOM Series      DICOM Image      DICOM Series      DICOM Image      DICOM Series      DICOM Image        Admission Information    Current Information    Attending Provider Admitting Provider Admission Type Admission Status   Darvin Cr MD McCarron, Ross E., MD Emergency Confirmed Admission          Admission Date/Time Discharge Date Hospital Service Auth/Cert Status   12/28/24 2032  Internal Medicine Incomplete          Hospital Area Unit Room/Bed    [facility] TELEMETRY UNIT K422/K422 A              Admission    Complaint        Hospital Account    Name Acct ID Class Status Primary Coverage   Taiwo Boyd [id number] IP- Inpatient Open [employer] [facility] [employer]          Guarantor Account (for Hospital Account #[id number])    Name Relation to Pt Service Area Active? Acct Type   Taiwo Boyd Self OHSSA Yes Personal/Family   Address Phone     [address] [place] [address] [phone](H)            Coverage Information (for Hospital Account #[id number])    F/O Payor/Plan Precert #   [employer] MGD [facility]/[employer] [id number]   Subscriber Subscriber #   Taiwo Boyd [id number]   Address Phone   [address] [address]  [place] [address] [phone]

## 2024-12-31 NOTE — PT/OT/SLP PROGRESS
Occupational Therapy   Treatment    Name: Taiwo Boyd  MRN: [id number]  Admitting Diagnosis:  Fracture of ramus of left pubis       Recommendations:     Discharge Recommendations: Moderate Intensity Therapy  Discharge Equipment Recommendations:  to be determined by next level of care  Barriers to discharge:  Other (Comment) (decreased ADL/mobility)    Assessment:     Taiwo Boyd is a 89 y.o. female with a medical diagnosis of Fracture of ramus of left pubis.  She presents with ..The primary encounter diagnosis was Pubic ramus fracture, left, closed, initial encounter. Diagnoses of Closed fracture of ramus of left pubis, initial encounter and Bradycardia were also pertinent to this visit.  . Performance deficits affecting function are weakness, impaired self care skills, impaired functional mobility, decreased lower extremity function, gait instability, impaired balance, pain.     Progression. Able to transfer out of bed to bedside commode this date with moderate assist and use of rolling walker, bowel movement in context of bedside commode as result.    Rehab Prognosis:  Good; patient would benefit from acute skilled OT services to address these deficits and reach maximum level of function.       Plan:     Patient to be seen 4 x/week to address the above listed problems via self-care/home management, therapeutic activities, therapeutic exercises  Plan of Care Expires: 01/28/25  Plan of Care Reviewed with: patient    Subjective     Chief Complaint: disliking the purwick device  Patient/Family Comments/goals: proud of herself for getting on the bedside commode  Pain/Comfort:  Pain Rating 1: 1/10 (L hip/groin)  Pain Addressed 1: Pre-medicate for activity, Reposition, Cessation of Activity, Nurse notified, Distraction  Pain Rating Post-Intervention 1: other (see comments) (4/10 pain in L hip /groin with weightbearing; decreased to 1/10 at end of session at rest)    Objective:     Communicated with: nursing prior to  session.  Patient found HOB elevated with bed alarm, Other (comments) (waffle overlay) upon OT entry to room.    General Precautions: Standard, fall    Orthopedic Precautions:LLE weight bearing as tolerated (clarified with ortho MD via secure chat)  Braces: N/A  Respiratory Status: Room air     Occupational Performance:     Bed Mobility:    Patient completed Supine to Sit with moderate assistance  Patient completed Sit to Supine with maximal assistance     Functional Mobility/Transfers:  Patient completed Sit <> Stand Transfer with moderate assist  with  rolling walker and moderate verbal /tactile cues for preparation to stand. X4 sit to stands performed (3 from sitting eob, 1 from bedside commode). Seated rest breaks and re-ed for technique between each effort.   Patient completed Toilet Transfer Step Transfer technique with moderate assistance with  rolling walker from bed to bedside commode to bed.  Functional Mobility: CGA for balance with rolling walker in standing; tolerates minimal forward steps, followed by increased pain, thus instructed in backward step return to seated eob. Bedside commode setup adjacent to bed, elevated height to mitigate LLE discomfort. Transfer training as above. Seated rest break followed by side steps eob with rolling walker and minimal assist with moderate verbal step sequencing cues.    Activities of Daily Living:  Upper Body Dressing: changed shirt/gown with SBA  Lower Body Dressing: moderate assistance ; instructed on assisted LLE first  Toileting: moderate assistance ; context of bedside commode; continent bowel movement; seated hygiene via lateral lean; instructed on walker management/ safety when standing with CGA to manage clothing.      [facility] 6 Click ADL: 18    Treatment & Education:  Patient and daughter re-educated/educated on role of OT. Patient oriented x 4.  Educated on progression skills (ie. Work toward transfer to bsc, then progress to mobilizing to true  bathroom).  Min redirection to task cues during session.  Educated on WBAT LLE as per ortho MD.  ADL / functional mobility  training as above with introduction of step sequencing with rolling walker, safe descent strategies to reduce discomfort when lowering from stand position, transfer training, and adaptive LB ADL instruction.  Removed purwick to facilitate more normalized toileting.  Educated daughter on purpose of waffle overlay and encouraged patient to continue with frequent self repositioning/ankle pumps.  100% reciprocation for call light.  Answered inquires in scope.              Patient left HOB elevated with all lines intact, call button in reach, bed alarm on, and nursing notified    GOALS:   Multidisciplinary Problems       Occupational Therapy Goals          Problem: Occupational Therapy    Goal Priority Disciplines Outcome Interventions   Occupational Therapy Goal     OT, PT/OT Progressing    Description: Goals to be met by: 01/28/25     Patient will increase functional independence with ADLs by performing:    UE Dressing with Modified [place].  LE Dressing with Modified [place].  Grooming while standing at sink with Modified [place].  Toileting from toilet with Modified [place] for hygiene and clothing management.   Toilet transfer to toilet with Modified [place].                         Time Tracking:     OT Date of Treatment: 12/31/24  OT Start Time: 1050  OT Stop Time: 1130  OT Total Time (min): 40 min    Billable Minutes:Self Care/Home Management 25 min  Therapeutic Activity 15 min    OT/RASHEED: OT          12/31/2024

## 2025-01-01 LAB
ALBUMIN SERPL BCP-MCNC: 2.8 G/DL (ref 3.5–5.2)
ALP SERPL-CCNC: 58 U/L (ref 40–150)
ALT SERPL W/O P-5'-P-CCNC: 17 U/L (ref 10–44)
ANION GAP SERPL CALC-SCNC: 10 MMOL/L (ref 8–16)
AST SERPL-CCNC: 16 U/L (ref 10–40)
BASOPHILS # BLD AUTO: 0.02 K/UL (ref 0–0.2)
BASOPHILS NFR BLD: 0.3 % (ref 0–1.9)
BILIRUB SERPL-MCNC: 0.4 MG/DL (ref 0.1–1)
BUN SERPL-MCNC: 50 MG/DL (ref 8–23)
CALCIUM SERPL-MCNC: 9.3 MG/DL (ref 8.7–10.5)
CHLORIDE SERPL-SCNC: 105 MMOL/L (ref 95–110)
CO2 SERPL-SCNC: 21 MMOL/L (ref 23–29)
CREAT SERPL-MCNC: 0.9 MG/DL (ref 0.5–1.4)
DIFFERENTIAL METHOD BLD: ABNORMAL
EOSINOPHIL # BLD AUTO: 0.3 K/UL (ref 0–0.5)
EOSINOPHIL NFR BLD: 4.7 % (ref 0–8)
ERYTHROCYTE [DISTWIDTH] IN BLOOD BY AUTOMATED COUNT: 13.4 % (ref 11.5–14.5)
EST. GFR  (NO RACE VARIABLE): >60 ML/MIN/1.73 M^2
GLUCOSE SERPL-MCNC: 86 MG/DL (ref 70–110)
HCT VFR BLD AUTO: 30.3 % (ref 37–48.5)
HGB BLD-MCNC: 9.9 G/DL (ref 12–16)
IMM GRANULOCYTES # BLD AUTO: 0.01 K/UL (ref 0–0.04)
IMM GRANULOCYTES NFR BLD AUTO: 0.2 % (ref 0–0.5)
LYMPHOCYTES # BLD AUTO: 1.9 K/UL (ref 1–4.8)
LYMPHOCYTES NFR BLD: 30.8 % (ref 18–48)
MAGNESIUM SERPL-MCNC: 2 MG/DL (ref 1.6–2.6)
MCH RBC QN AUTO: 29.6 PG (ref 27–31)
MCHC RBC AUTO-ENTMCNC: 32.7 G/DL (ref 32–36)
MCV RBC AUTO: 91 FL (ref 82–98)
MONOCYTES # BLD AUTO: 0.8 K/UL (ref 0.3–1)
MONOCYTES NFR BLD: 12.6 % (ref 4–15)
NEUTROPHILS # BLD AUTO: 3.1 K/UL (ref 1.8–7.7)
NEUTROPHILS NFR BLD: 51.4 % (ref 38–73)
NRBC BLD-RTO: 0 /100 WBC
PHOSPHATE SERPL-MCNC: 3.3 MG/DL (ref 2.7–4.5)
PLATELET # BLD AUTO: 176 K/UL (ref 150–450)
PMV BLD AUTO: 11.6 FL (ref 9.2–12.9)
POTASSIUM SERPL-SCNC: 4.6 MMOL/L (ref 3.5–5.1)
PROT SERPL-MCNC: 5.4 G/DL (ref 6–8.4)
RBC # BLD AUTO: 3.34 M/UL (ref 4–5.4)
SODIUM SERPL-SCNC: 136 MMOL/L (ref 136–145)
WBC # BLD AUTO: 6.01 K/UL (ref 3.9–12.7)

## 2025-01-01 PROCEDURE — 25000003 PHARM REV CODE 250

## 2025-01-01 PROCEDURE — 83735 ASSAY OF MAGNESIUM: CPT

## 2025-01-01 PROCEDURE — 85025 COMPLETE CBC W/AUTO DIFF WBC: CPT

## 2025-01-01 PROCEDURE — 80053 COMPREHEN METABOLIC PANEL: CPT

## 2025-01-01 PROCEDURE — 36415 COLL VENOUS BLD VENIPUNCTURE: CPT

## 2025-01-01 PROCEDURE — 11000001 HC ACUTE MED/SURG PRIVATE ROOM

## 2025-01-01 PROCEDURE — 97530 THERAPEUTIC ACTIVITIES: CPT

## 2025-01-01 PROCEDURE — 84100 ASSAY OF PHOSPHORUS: CPT

## 2025-01-01 PROCEDURE — 63600175 PHARM REV CODE 636 W HCPCS

## 2025-01-01 PROCEDURE — 63600175 PHARM REV CODE 636 W HCPCS: Performed by: ORTHOPAEDIC SURGERY

## 2025-01-01 PROCEDURE — 97535 SELF CARE MNGMENT TRAINING: CPT

## 2025-01-01 RX ORDER — KETOROLAC TROMETHAMINE 30 MG/ML
15 INJECTION, SOLUTION INTRAMUSCULAR; INTRAVENOUS ONCE
Status: COMPLETED | OUTPATIENT
Start: 2025-01-01 | End: 2025-01-01

## 2025-01-01 RX ADMIN — ACETAMINOPHEN 500 MG: 500 TABLET ORAL at 12:01

## 2025-01-01 RX ADMIN — ATORVASTATIN CALCIUM 40 MG: 40 TABLET, FILM COATED ORAL at 09:01

## 2025-01-01 RX ADMIN — KETOROLAC TROMETHAMINE 15 MG: 30 INJECTION, SOLUTION INTRAMUSCULAR; INTRAVENOUS at 05:01

## 2025-01-01 RX ADMIN — FERROUS SULFATE TAB 325 MG (65 MG ELEMENTAL FE) 1 EACH: 325 (65 FE) TAB at 09:01

## 2025-01-01 RX ADMIN — LOSARTAN POTASSIUM 100 MG: 50 TABLET, FILM COATED ORAL at 09:01

## 2025-01-01 RX ADMIN — POLYETHYLENE GLYCOL 3350 17 G: 17 POWDER, FOR SOLUTION ORAL at 09:01

## 2025-01-01 RX ADMIN — ACETAMINOPHEN 500 MG: 500 TABLET ORAL at 06:01

## 2025-01-01 RX ADMIN — ACETAMINOPHEN 500 MG: 500 TABLET ORAL at 05:01

## 2025-01-01 RX ADMIN — ENOXAPARIN SODIUM 40 MG: 40 INJECTION SUBCUTANEOUS at 05:01

## 2025-01-01 NOTE — PROGRESS NOTES
"[facility]  Orthopedics  Progress Note    Patient Name: Taiwo Boyd  MRN: [id number]  Admission Date: 12/28/2024  Hospital Length of Stay: 3 days  Attending Provider: Darvin Cr MD  Primary Care Provider: Fernando Lamas MD    Subjective:     Principal Problem:Fracture of ramus of left pubis    Principal Orthopedic Problem:  Left-sided pubic ramus fracture    Interval History:  Complaining of pain left hip    Review of patient's allergies indicates:   Allergen Reactions    Codeine phosphate Rash and Other (See Comments)       Current Facility-Administered Medications   Medication    acetaminophen tablet 500 mg    atorvastatin tablet 40 mg    dextrose 50% injection 12.5 g    dextrose 50% injection 25 g    enoxaparin injection 40 mg    ferrous sulfate tablet 1 each    glucagon (human recombinant) injection 1 mg    glucose chewable tablet 16 g    glucose chewable tablet 24 g    ketorolac injection 15 mg    losartan tablet 100 mg    naloxone 0.4 mg/mL injection 0.02 mg    polyethylene glycol packet 17 g    sodium chloride 0.9% flush 10 mL    traMADoL tablet 50 mg     Objective:     Vital Signs (Most Recent):  Temp: 98.2 °F (36.8 °C) (01/01/25 1145)  Pulse: (!) 56 (01/01/25 1145)  Resp: 18 (01/01/25 1145)  BP: (!) 99/54 (01/01/25 1145)  SpO2: 95 % (01/01/25 1145) Vital Signs (24h Range):  Temp:  [97.4 °F (36.3 °C)-98.4 °F (36.9 °C)] 98.2 °F (36.8 °C)  Pulse:  [53-60] 56  Resp:  [16-20] 18  SpO2:  [95 %-98 %] 95 %  BP: ()/(54-67) 99/54     Weight: 67.5 kg (148 lb 13 oz)  Height: 5' 3" (160 cm)  Body mass index is 26.36 kg/m².      Intake/Output Summary (Last 24 hours) at 1/1/2025 1517  Last data filed at 1/1/2025 1146  Gross per 24 hour   Intake 476 ml   Output 650 ml   Net -174 ml       Ortho/SPM Exam left groin tender   Range of motion left hip improved    Significant Labs: All pertinent labs within the past 24 hours have been reviewed.    Significant Imaging: I have reviewed and " interpreted all pertinent imaging results/findings.    Assessment/Plan:     Active Diagnoses:    Diagnosis Date Noted POA    PRINCIPAL PROBLEM:  Fracture of ramus of left pubis [S32.592A] 12/29/2024 Yes      Problems Resolved During this Admission:     Plan:  Continue PT weight bear as tolerated   Toradol ordered today   Skilled nursing evaluation    Williams Manzano Jr, MD  Orthopedics  [place] [facility]

## 2025-01-01 NOTE — PROGRESS NOTES
[facility] Hospital Medicine Progress Note    Primary Team: [facility] Hospitalist Team B  Attending Physician: Darvin Cr MD   Resident: Keith Wright MD  Intern: Helen Encarnacion MD    Subjective/Interval History     No complaints this morning. Pain controlled w tylenol. Working with PT/OT pending SNF placement.      Objective   Last 24 Hour Vital Signs:  BP  Min: 103/56  Max: 142/66  Temp  Av °F (36.7 °C)  Min: 97.4 °F (36.3 °C)  Max: 98.5 °F (36.9 °C)  Pulse  Av.2  Min: 53  Max: 63  Resp  Av  Min: 16  Max: 20  SpO2  Av %  Min: 95 %  Max: 98 %  I/O last 3 completed shifts:  In: 785 [P.O.:785]  Out: 550 [Urine:550]    Physical Examination:  General: Sitting up in bed in NAD  CV: Normal rate. Regular rhythm.  Pulm: CTAB. Normal work of breathing on RA   Abd: Soft, non tender, non distended, normoactive bowel sounds.   Msk: L hip tenderness and decreased ROM   Extremities: atraumatic, no deformities, no edema.  Skin: Dry, warm, intact. No bruising or rashes.  Neuro: AAOx4, no focal deficits     Labs, Micro, and Imaging:  I have independently reviewed data.     Assessment & Plan     Taiwo Boyd is a 89 y.o. female admitted for L pubic rami fractures after a mechanical fall. Ortho rec conservative treatment and weight bearing to tolerance. Working with PT/OT and pain controlled w tylenol. Pending SNF placement.     L pubic rami fractures  - Imaging showed mildly displaced fracture of L superior and inferior pubic rami  - Ortho consulted - No operative intervention necessary. Recommended pain control, PT/OT, and IPR/SNF placement.   - Continue PT/OT  - Weight bearing to tolerance  - Outpatient ortho follow up     Hypertension  - Home meds: Coreg 3.125 mg BID, HCTZ 12.5 mg, Irbesartan 300 mg daily  - Stopped coreg and HCTZ  - Continue Losartan while inpatient (irbesartan not on formulary)      Hyperlipidemia  - Continue Lipitor 40 mg nightly    Code Status: Full  DVT Prophylaxis: Lovenox   Diet: Cardiac    Disposition: Pending SNF acceptance    Case will be discussed with attending physician and attestation to follow.    Helen Encanracion MD  [facility] Internal Medicine, PGY-I  U Hospitalist Team B      [facility] Medicine Hospitalist Pager numbers:   [facility] Hospitalist Medicine Team A (Maury/Bart): [phone]  [facility] Hospitalist Medicine Team B (Kunal/Shaye):  [phone]

## 2025-01-01 NOTE — PT/OT/SLP PROGRESS
Occupational Therapy   Treatment    Name: Taiwo Boyd  MRN: [id number]  Admitting Diagnosis:  Fracture of ramus of left pubis       Recommendations:     Discharge Recommendations: Moderate Intensity Therapy  Discharge Equipment Recommendations:  to be determined by next level of care  Barriers to discharge:  Other (Comment) (decreased ADL/mobility)    Assessment:     Taiwo Boyd is a 89 y.o. female with a medical diagnosis of Fracture of ramus of left pubis.  She presents with .The primary encounter diagnosis was Pubic ramus fracture, left, closed, initial encounter. Diagnoses of Closed fracture of ramus of left pubis, initial encounter and Bradycardia were also pertinent to this visit.  . Performance deficits affecting function are weakness, impaired endurance, impaired self care skills, impaired functional mobility, gait instability, impaired balance, pain, decreased coordination, decreased lower extremity function, impaired coordination.     Continue OT POC. Progressing, decreased assist to min assist on some sit to stands this date.    Rehab Prognosis:  Good; patient would benefit from acute skilled OT services to address these deficits and reach maximum level of function.       Plan:     Patient to be seen 4 x/week to address the above listed problems via self-care/home management, therapeutic activities, therapeutic exercises  Plan of Care Expires: 01/28/25  Plan of Care Reviewed with: patient    Subjective     Chief Complaint: mild pain with standing  Patient/Family Comments/goals: eager to go to the skilled nursing facility  Pain/Comfort:  Pain Rating 1: 0/10  Pain Addressed 1: Reposition, Cessation of Activity, Nurse notified  Pain Rating Post-Intervention 1: other (see comments) (3/10 L groin/hip with activity; no reported pain at end of session)    Objective:     Communicated with: nursing prior to session.  Patient found HOB elevated with bed alarm, Other (comments) (waffle overlay) upon OT entry to  room.    General Precautions: Standard, fall    Orthopedic Precautions:LLE weight bearing as tolerated  Braces: N/A  Respiratory Status: Room air     Occupational Performance:     Bed Mobility:    Patient completed Supine to Sit with minimum assistance  Patient completed Sit to Supine with moderate assistance     Functional Mobility/Transfers:  Patient completed Sit <> Stand Transfer with minimal assist with rolling walker with seated rest break x2 trials,  with  moderate assist with rolling walker on last trial with increased fatigue   Functional Mobility: 2 trials standing duration x ~90 seconds with rolling walker with CGA; 2 trials lateral forward side steps alongside eob with minimal assist toward right/ moderate assist toward left.    Activities of Daily Living:  Grooming: set up  Lower Body Dressing:partial performance/verbal review; patient with initial perception for threading on RLE first - post re-education patient with recall for sequence of LLE first.       [facility] 6 Click ADL: 18    Treatment & Education:  Patient re-educated on role of OT.  Patient oriented x 4  ADL / functional mobility  training as above.   Re-ed for walker management techniques and adaptive LB strategies.  Multi cues to push through BUE on walker to decrease min LLE discomfort.  100% reciprocation for call light.  Answered inquires in scope.              Patient left HOB elevated with all lines intact, bed alarm on, and nursing notified    GOALS:   Multidisciplinary Problems       Occupational Therapy Goals          Problem: Occupational Therapy    Goal Priority Disciplines Outcome Interventions   Occupational Therapy Goal     OT, PT/OT Progressing    Description: Goals to be met by: 01/28/25     Patient will increase functional independence with ADLs by performing:    UE Dressing with Modified [place].  LE Dressing with Modified [place].  Grooming while standing at sink with Modified [place].  Toileting from toilet  with Modified [place] for hygiene and clothing management.   Toilet transfer to toilet with Modified [place].                         Time Tracking:     OT Date of Treatment: 01/01/25  OT Start Time: 0906  OT Stop Time: 0925  OT Total Time (min): 19 min    Billable Minutes:Self Care/Home Management 8 min  Therapeutic Activity 11 min    OT/RASHEED: OT          1/1/2025

## 2025-01-01 NOTE — PT/OT/SLP PROGRESS
Physical Therapy Treatment    Patient Name:  Taiwo Boyd   MRN:  [id number]    Recommendations:     Discharge Recommendations: Moderate Intensity Therapy  Discharge Equipment Recommendations: to be determined by next level of care  Barriers to discharge:  decreased mobility from baseline    Assessment:     Taiwo Boyd is a 89 y.o. female admitted with a medical diagnosis of Fracture of ramus of left pubis.  She presents with the following impairments/functional limitations: weakness, impaired endurance, impaired self care skills, impaired functional mobility, gait instability, impaired balance, pain Pt progressed to taking steps in room using RW.    Rehab Prognosis: Good; patient would benefit from acute skilled PT services to address these deficits and reach maximum level of function.    Recent Surgery: * No surgery found *      Plan:     During this hospitalization, patient to be seen 5 x/week to address the identified rehab impairments via gait training, therapeutic activities, therapeutic exercises, neuromuscular re-education and progress toward the following goals:    Plan of Care Expires:  01/10/25    Subjective     Chief Complaint: pt wanting the purewick back  Patient/Family Comments/goals: pleased she was able to get up today  Pain/Comfort:  Pain Rating 1: 0/10 (groin)  Pain Addressed 1: Pre-medicate for activity, Reposition, Cessation of Activity, Nurse notified, Distraction  Pain Rating Post-Intervention 1: other (see comments) (4/10 pain in L groin with weightbearing; decreased to 1/10 at end of session at rest)      Objective:     Communicated with nurse prior to session.  Patient found HOB elevated with bed alarm (waffle overlay) upon PT entry to room.     General Precautions: Standard, fall  Orthopedic Precautions: LLE weight bearing as tolerated  Braces: N/A  Respiratory Status: Room air     Functional Mobility:  Bed Mobility:     Supine to Sit: moderate assistance  Sit to Supine: moderate  assistance  Transfers:     Sit to Stand:  moderate assistance with rolling walker x 3 stands from EOB; VCs/visual cues for effective technique throughout  Gait: patient required step by step VCs and assistance for RW management; performed lateral stepping to HOB, forward and backward for each trial, totaling ~4ft, 10ft and 2 ft; pt able to clear floor with L foot, short steps    AM-PAC 6 CLICK MOBILITY  Total Score: 13     Treatment & Education:  -pt performed 20 APs, 10 QS, GS, 10 heel slides/hip abd RLE, 5 heel slides LLE  -performed mobility as above  -cont to encourage repositioning, ankle pumps/ex  intermittently throughout the day    Patient left HOB elevated with all lines intact, call button in reach, bed alarm on, and nurse notified..Daughters were present.    GOALS:   Multidisciplinary Problems       Physical Therapy Goals          Problem: Physical Therapy    Goal Priority Disciplines Outcome Interventions   Physical Therapy Goal     PT, PT/OT     Description: Goals to be met by: Discharge     Patient will increase functional independence with mobility by performin. Supine to sit with Moderate Assistance   WAITING FOR WB STATUS FROM ORTHO                         Time Tracking:     PT Received On: 25  PT Start Time: 1539     PT Stop Time: 1630  PT Total Time (min): 51 min     Billable Minutes: Gait Training 15, Therapeutic Activity 15, and Therapeutic Exercise 10    Treatment Type: Treatment  PT/PTA: PT     Number of PTA visits since last PT visit: 0     2025

## 2025-01-02 ENCOUNTER — TELEPHONE (OUTPATIENT)
Dept: ORTHOPEDICS | Facility: CLINIC | Age: 89
End: 2025-01-02
Payer: MEDICARE

## 2025-01-02 DIAGNOSIS — R10.2 PELVIC PAIN: Primary | ICD-10-CM

## 2025-01-02 LAB
ALBUMIN SERPL BCP-MCNC: 2.8 G/DL (ref 3.5–5.2)
ALP SERPL-CCNC: 58 U/L (ref 40–150)
ALT SERPL W/O P-5'-P-CCNC: 23 U/L (ref 10–44)
ANION GAP SERPL CALC-SCNC: 9 MMOL/L (ref 8–16)
AST SERPL-CCNC: 24 U/L (ref 10–40)
BASOPHILS # BLD AUTO: 0.02 K/UL (ref 0–0.2)
BASOPHILS NFR BLD: 0.4 % (ref 0–1.9)
BILIRUB SERPL-MCNC: 0.4 MG/DL (ref 0.1–1)
BUN SERPL-MCNC: 51 MG/DL (ref 8–23)
CALCIUM SERPL-MCNC: 9.2 MG/DL (ref 8.7–10.5)
CHLORIDE SERPL-SCNC: 107 MMOL/L (ref 95–110)
CO2 SERPL-SCNC: 22 MMOL/L (ref 23–29)
CREAT SERPL-MCNC: 0.9 MG/DL (ref 0.5–1.4)
DIFFERENTIAL METHOD BLD: ABNORMAL
EOSINOPHIL # BLD AUTO: 0.2 K/UL (ref 0–0.5)
EOSINOPHIL NFR BLD: 4.8 % (ref 0–8)
ERYTHROCYTE [DISTWIDTH] IN BLOOD BY AUTOMATED COUNT: 13.6 % (ref 11.5–14.5)
EST. GFR  (NO RACE VARIABLE): >60 ML/MIN/1.73 M^2
FERRITIN SERPL-MCNC: 383 NG/ML (ref 20–300)
FOLATE SERPL-MCNC: 8.6 NG/ML (ref 4–24)
GLUCOSE SERPL-MCNC: 88 MG/DL (ref 70–110)
HCT VFR BLD AUTO: 29.8 % (ref 37–48.5)
HGB BLD-MCNC: 9.7 G/DL (ref 12–16)
IMM GRANULOCYTES # BLD AUTO: 0.01 K/UL (ref 0–0.04)
IMM GRANULOCYTES NFR BLD AUTO: 0.2 % (ref 0–0.5)
IRON SERPL-MCNC: 55 UG/DL (ref 30–160)
LYMPHOCYTES # BLD AUTO: 1.4 K/UL (ref 1–4.8)
LYMPHOCYTES NFR BLD: 29.3 % (ref 18–48)
MAGNESIUM SERPL-MCNC: 2.2 MG/DL (ref 1.6–2.6)
MCH RBC QN AUTO: 29.8 PG (ref 27–31)
MCHC RBC AUTO-ENTMCNC: 32.6 G/DL (ref 32–36)
MCV RBC AUTO: 91 FL (ref 82–98)
MONOCYTES # BLD AUTO: 0.6 K/UL (ref 0.3–1)
MONOCYTES NFR BLD: 13.3 % (ref 4–15)
NEUTROPHILS # BLD AUTO: 2.5 K/UL (ref 1.8–7.7)
NEUTROPHILS NFR BLD: 52 % (ref 38–73)
NRBC BLD-RTO: 0 /100 WBC
PHOSPHATE SERPL-MCNC: 3.9 MG/DL (ref 2.7–4.5)
PLATELET # BLD AUTO: 191 K/UL (ref 150–450)
PMV BLD AUTO: 12.3 FL (ref 9.2–12.9)
POTASSIUM SERPL-SCNC: 5.1 MMOL/L (ref 3.5–5.1)
PROT SERPL-MCNC: 5.5 G/DL (ref 6–8.4)
RBC # BLD AUTO: 3.26 M/UL (ref 4–5.4)
SARS-COV-2 RDRP RESP QL NAA+PROBE: NEGATIVE
SATURATED IRON: 22 % (ref 20–50)
SODIUM SERPL-SCNC: 138 MMOL/L (ref 136–145)
TOTAL IRON BINDING CAPACITY: 250 UG/DL (ref 250–450)
TRANSFERRIN SERPL-MCNC: 169 MG/DL (ref 200–375)
VIT B12 SERPL-MCNC: 220 PG/ML (ref 210–950)
WBC # BLD AUTO: 4.82 K/UL (ref 3.9–12.7)

## 2025-01-02 PROCEDURE — 63600175 PHARM REV CODE 636 W HCPCS

## 2025-01-02 PROCEDURE — 97530 THERAPEUTIC ACTIVITIES: CPT

## 2025-01-02 PROCEDURE — 84466 ASSAY OF TRANSFERRIN: CPT

## 2025-01-02 PROCEDURE — 25000003 PHARM REV CODE 250

## 2025-01-02 PROCEDURE — 82607 VITAMIN B-12: CPT

## 2025-01-02 PROCEDURE — 36415 COLL VENOUS BLD VENIPUNCTURE: CPT

## 2025-01-02 PROCEDURE — 83735 ASSAY OF MAGNESIUM: CPT

## 2025-01-02 PROCEDURE — 11000001 HC ACUTE MED/SURG PRIVATE ROOM

## 2025-01-02 PROCEDURE — 82746 ASSAY OF FOLIC ACID SERUM: CPT

## 2025-01-02 PROCEDURE — 82728 ASSAY OF FERRITIN: CPT

## 2025-01-02 PROCEDURE — 87635 SARS-COV-2 COVID-19 AMP PRB: CPT | Performed by: INTERNAL MEDICINE

## 2025-01-02 PROCEDURE — 85025 COMPLETE CBC W/AUTO DIFF WBC: CPT

## 2025-01-02 PROCEDURE — 84100 ASSAY OF PHOSPHORUS: CPT

## 2025-01-02 PROCEDURE — 80053 COMPREHEN METABOLIC PANEL: CPT

## 2025-01-02 PROCEDURE — 97535 SELF CARE MNGMENT TRAINING: CPT

## 2025-01-02 RX ORDER — ERGOCALCIFEROL 1.25 MG/1
50000 CAPSULE ORAL
Status: DISCONTINUED | OUTPATIENT
Start: 2025-01-02 | End: 2025-01-03 | Stop reason: HOSPADM

## 2025-01-02 RX ORDER — ACETAMINOPHEN 500 MG
500 TABLET ORAL EVERY 6 HOURS
Status: ON HOLD
Start: 2025-01-02

## 2025-01-02 RX ADMIN — LOSARTAN POTASSIUM 100 MG: 50 TABLET, FILM COATED ORAL at 08:01

## 2025-01-02 RX ADMIN — ACETAMINOPHEN 500 MG: 500 TABLET ORAL at 11:01

## 2025-01-02 RX ADMIN — ENOXAPARIN SODIUM 40 MG: 40 INJECTION SUBCUTANEOUS at 05:01

## 2025-01-02 RX ADMIN — ERGOCALCIFEROL 50000 UNITS: 1.25 CAPSULE ORAL at 11:01

## 2025-01-02 RX ADMIN — ACETAMINOPHEN 500 MG: 500 TABLET ORAL at 05:01

## 2025-01-02 RX ADMIN — FERROUS SULFATE TAB 325 MG (65 MG ELEMENTAL FE) 1 EACH: 325 (65 FE) TAB at 09:01

## 2025-01-02 RX ADMIN — ACETAMINOPHEN 500 MG: 500 TABLET ORAL at 12:01

## 2025-01-02 RX ADMIN — ATORVASTATIN CALCIUM 40 MG: 40 TABLET, FILM COATED ORAL at 09:01

## 2025-01-02 RX ADMIN — ACETAMINOPHEN 500 MG: 500 TABLET ORAL at 06:01

## 2025-01-02 NOTE — PT/OT/SLP PROGRESS
Occupational Therapy   Treatment    Name: Taiwo Boyd  MRN: [id number]  Admitting Diagnosis:  Fracture of ramus of left pubis       Recommendations:     Discharge Recommendations: Moderate Intensity Therapy  Discharge Equipment Recommendations:  to be determined by next level of care  Barriers to discharge:  Other (Comment) (no barriers to d/c to next care level)    Assessment:     Taiwo Boyd is a 89 y.o. female with a medical diagnosis of Fracture of ramus of left pubis.  She presents with .The primary encounter diagnosis was Pubic ramus fracture, left, closed, initial encounter. Diagnoses of Closed fracture of ramus of left pubis, initial encounter, Bradycardia, and Normocytic anemia were also pertinent to this visit.  . Performance deficits affecting function are weakness, impaired self care skills, impaired functional mobility, gait instability, impaired balance, pain, decreased lower extremity function.     Progressing well. Improved confidence. Continue  OT POC.    Rehab Prognosis:  Good; patient would benefit from acute skilled OT services to address these deficits and reach maximum level of function.       Plan:     Patient to be seen 5 x/week to address the above listed problems via self-care/home management, therapeutic activities, therapeutic exercises  Plan of Care Expires: 01/28/25  Plan of Care Reviewed with: patient    Subjective     Chief Complaint: mild body discomfort  Patient/Family Comments/goals: reports that she feels good today; reports she understands post education on therapist recommendation to promote self initiation of using call light to use bathroom (bsc with nurse staff) to reduce dependence on purwick  Pain/Comfort:  Pain Rating 1: 0/10  Pain Addressed 1: Reposition, Cessation of Activity  Pain Rating Post-Intervention 1: other (see comments) (minimal L hip / groin pain)  Pain Addressed 2: Reposition    Objective:     Communicated with: nursing prior to session.  Patient found HOB  elevated with bed alarm, Other (comments) (waffle overlay) upon OT entry to room.    General Precautions: Standard, fall    Orthopedic Precautions:LLE weight bearing as tolerated  Braces: N/A  Respiratory Status: Room air     Occupational Performance:     Bed Mobility:    Patient completed Supine to Sit with minimum assistance  Patient completed Sit to Supine with moderate assistance     Functional Mobility/Transfers:  Patient completed Sit <> Stand Transfer with minimal assist x3 trials  with  rolling walker   Patient completed Toilet Transfer Step Transfer technique with minimum assistance with  rolling walker  Functional Mobility: in room mobility to / from true bathroom with rolling walker, minimal assist up to moderate verbal cues for step sequencing / turning. Seated rest break. Brief out of room mobility with rolling walker, minimal / CGA.    Activities of Daily Living:  Lower Body Dressing: minimal assist; LLE first  Toileting: moderate assistance ; true bathroom; min verbal cues for safety  in context of true bathroom; bowel movement      [facility] 6 Click ADL: 20    Treatment & Education:  Patient oriented x4.  Bed mobility instruction with cues for pushing through RLE to enable improved ease of moving LLE.  Transfer training / functional mobility training as above with rolling walker, minimal to moderate verbal cues for step sequencing.  ADL training via adaptive technique.  Returned to bed. Needs in reach.    Patient left HOB elevated with all lines intact, call button in reach, bed alarm on, and nursing notified    GOALS:   Multidisciplinary Problems       Occupational Therapy Goals          Problem: Occupational Therapy    Goal Priority Disciplines Outcome Interventions   Occupational Therapy Goal     OT, PT/OT Progressing    Description: Goals to be met by: 01/28/25     Patient will increase functional independence with ADLs by performing:    UE Dressing with Modified [place].  LE Dressing with  Modified [place].  Grooming while standing at sink with Modified [place].  Toileting from toilet with Modified [place] for hygiene and clothing management.   Toilet transfer to toilet with Modified [place].                         Time Tracking:     OT Date of Treatment: 01/02/25  OT Start Time: 1123  OT Stop Time: 1147  OT Total Time (min): 24 min    Billable Minutes:Self Care/Home Management 16 min  Therapeutic Activity 8 min    OT/RASHEED: OT          1/2/2025

## 2025-01-02 NOTE — TELEPHONE ENCOUNTER
----- Message from Alyson sent at 1/2/2025 10:02 AM CST -----  Regarding: FW: HFU  Good Morning! This patient needs a HFU.  See original message sent below.  Patient will be discharging to SNF this afternoon.  ----- Message -----  From: Ambika Larose MA  Sent: 1/2/2025   9:56 AM CST  To: Alyson Phillips  Subject: FW: HFU                                          Good Morning,    Dr. Medina do not see hip pain unless patient had hip surgery by him. He only specialized in knees.  ----- Message -----  From: Alyson Phillips  Sent: 1/2/2025   9:28 AM CST  To: [facility] Staff  Subject: HFU                                              Patient will be discharging from [facility] and a HFU was requested with Ortho by DC provider.  Please schedule and message me back so DC can relay appointment information to patient prior to discharge. A referral has been entered.      Patient discharging to SNF    Seen inpatient by Dr Thomas Mcneill    DX: Pubic ramus fracture, left    ThanksKaitlynn  Physician Referral Specialist/Discharge

## 2025-01-02 NOTE — PLAN OF CARE
Problem: Physical Therapy  Goal: Physical Therapy Goal  Description: Goals to be met by: 1/15/25     Patient will increase functional independence with mobility by performin. Supine to sit with Stand-by Assistance  2. Sit to supine with Stand-by Assistance  3. Sit to stand transfer with Stand-by Assistance with use of RW.   4. Bed to chair transfer with Stand-by Assistance using Rolling Walker  5. Gait  x 50 feet with Stand-by Assistance using Rolling Walker.       Outcome: Progressing     Pt participates in bed mobility, transfers to standing and few lateral steps along bedside with use of RW. Therapy will continue to progress pt as able.

## 2025-01-02 NOTE — PROGRESS NOTES
The sw spoke to Julia with [facility] and the pt's SNF auth is [id number]. The sw spoke to Manju with [facility] otilia richard may be able to accept the pt today. She has a meeting at 8:15am and will know after she attends on bed availability The pt has also been accepted to [facility] [phone] per Nathalia via a voice message. The pt states she will speak with her dtr Jailene to see which SNF she will go to b/c Nathalia reached out to Jailene to sign admit paperwork. She will call this sw back.

## 2025-01-02 NOTE — PLAN OF CARE
[facility]    FACILITY TRANSFER ORDERS      Patient Name: Taiwo Boyd  YOB: 1935    PCP: Fernando Lamas MD   PCP Address: [address] / [place] [address]  PCP Phone Number: [phone]  PCP Fax: [phone]    Encounter Date: 01/03/2025    Admit to: SNF    Vital Signs:  Routine    Diagnoses:   Active Hospital Problems    Diagnosis  POA    *Fracture of ramus of left pubis [S32.592A]  Yes      Resolved Hospital Problems   No resolved problems to display.       Allergies:  Review of patient's allergies indicates:   Allergen Reactions    Codeine phosphate Rash and Other (See Comments)       Diet: cardiac diet    Activities: Up with assistance and Weight bearing as tolerated    Goals of Care Treatment Preferences:  Code Status: Full Code      Nursing: Routine      Labs: CBC and CMP  weekly      CONSULTS:    Physical Therapy to evaluate and treat. , Occupational Therapy to evaluate and treat., and  to evaluate for community resources/long-range planning.    MISCELLANEOUS CARE:  Routine Skin for Bedridden Patients: Apply moisture barrier cream to all skin folds and wet areas in perineal area daily and after baths and all bowel movements.    WOUND CARE ORDERS  None    Medications: Review discharge medications with patient and family and provide education.         Medication List        START taking these medications      acetaminophen 500 MG tablet  Commonly known as: TYLENOL  Take 1 tablet (500 mg total) by mouth every 6 (six) hours.            CONTINUE taking these medications      atorvastatin 40 MG tablet  Commonly known as: LIPITOR  Take 1 tablet by mouth once daily     carvediloL 3.125 MG tablet  Commonly known as: COREG  Take 1 tablet by mouth twice daily     ergocalciferol 50,000 unit Cap  Commonly known as: ERGOCALCIFEROL  Take 1 capsule (50,000 Units total) by mouth every 30 days.     ferrous sulfate 325 mg (65 mg iron) Tab tablet  Commonly  known as: FEOSOL  Take 325 mg by mouth once daily.     hydroCHLOROthiazide 12.5 MG Tab  Commonly known as: HYDRODIURIL  TAKE 1 TABLET BY MOUTH ONCE DAILY IN THE MORNING     irbesartan 300 MG tablet  Commonly known as: AVAPRO  Take 1 tablet by mouth once daily                Immunizations Administered as of 1/3/2025       Name Date Dose VIS Date Route Exp Date    COVID-19, MRNA, LN-S, PF (Moderna) 11/22/2021 0.5 mL -- Intramuscular --    Site: Right arm     : [employer]     Lot: [id number]     Comment: Adminis     COVID-19, MRNA, LN-S, PF (Moderna) 4/21/2021 -- -- Intramuscular --    Site: Right arm     : [employer]     Lot: [id number]     Comment: Adminis     COVID-19, MRNA, LN-S, PF (Moderna) 3/26/2021 -- -- Intramuscular --    Site: Right arm     : Modern[employer]     Lot: [id number]     Comment: Adminis             Some patients may experience side effects after vaccination.  These may include fever, headache, muscle or joint aches.  Most symptoms resolve with 24-48 hours and do not require urgent medical evaluation unless they persist for more than 72 hours or symptoms are concerning for an unrelated medical condition.          _________________________________  Manjit Omalley DO  01/03/2025

## 2025-01-02 NOTE — PROGRESS NOTES
Refill request received. Copy(s) of last refill (s) of requested medication (s)  Below:  losartan (COZAAR) 50 MG tablet 90 tablet 0 3/20/2023     Sig: take 1 TABLET BY MOUTH EVERY DAY          ARB Angiotension Receptor Blocker - Angiotension II Receptor Antagonist Refill Protocol - 12 Month Protocol Passed    Patient was seen in office today 6/20/23    Refilled per protocol     The sw received a chat from [facility] stating they have 3 nurses out and a charge for this shift. Next shift they are short 1 nurse. They are asking their admits to come tomorrow unless this nurse picks up the shift for tonight. They are waiting for her to call them back. If she comes then I can take this one and the other pt also. Otherwise, it will be tomorrow.

## 2025-01-02 NOTE — PLAN OF CARE
Medicare Message     Important Message from Medicare regarding Discharge Appeal Rights Explained to patient/caregiver; Signed/date by patient/caregiver   Date IMM was signed 1/2/2025   Time IMM was signed [id number]

## 2025-01-02 NOTE — PT/OT/SLP PROGRESS
Physical Therapy Treatment    Patient Name:  Taiwo Boyd   MRN:  [id number]    Recommendations:     Discharge Recommendations: Moderate Intensity Therapy  Discharge Equipment Recommendations: to be determined by next level of care  Barriers to discharge:  limited functional endurance/independence    Assessment:     Taiwo Boyd is a 89 y.o. female admitted with a medical diagnosis of Fracture of ramus of left pubis.  She presents with the following impairments/functional limitations: weakness, impaired endurance, impaired self care skills, impaired functional mobility, gait instability, impaired balance, pain, decreased safety awareness, decreased lower extremity function, decreased ROM, orthopedic precautions.    Pt participates in bed mobility, transfers to standing and few lateral steps along bedside with use of RW. Therapy will continue to progress pt as able.    Rehab Prognosis: Good; patient would benefit from acute skilled PT services to address these deficits and reach maximum level of function.    Recent Surgery: * No surgery found *      Plan:     During this hospitalization, patient to be seen 5 x/week to address the identified rehab impairments via gait training, therapeutic activities, therapeutic exercises, neuromuscular re-education and progress toward the following goals:    Plan of Care Expires:  01/15/25    Subjective     Chief Complaint: pain  Patient/Family Comments/goals: to continue to progress functional mobility  Pain/Comfort:  Pain Rating 1: 2/10 (L hip/pelvis)  Pain Rating Post-Intervention 1:  (not rated)      Objective:     Communicated with Nurse prior to session.  Patient found HOB elevated with bed alarm (waffle overlay) upon PT entry to room.     General Precautions: Standard, fall  Orthopedic Precautions: LLE weight bearing as tolerated  Braces: N/A  Respiratory Status: Room air     Functional Mobility:  Bed Mobility:     Supine to Sit: minimum assistance  Sit to Supine: minimum  assistance  Transfers:     Sit to Stand:  minimum assistance with rolling walker  Gait: ~2-3 BLE lateral side steps along bedside with use of RW and CGA      AM-PAC 6 CLICK MOBILITY  Turning over in bed (including adjusting bedclothes, sheets and blankets)?: 3  Sitting down on and standing up from a chair with arms (e.g., wheelchair, bedside commode, etc.): 3  Moving from lying on back to sitting on the side of the bed?: 3  Moving to and from a bed to a chair (including a wheelchair)?: 3  Need to walk in hospital room?: 3  Climbing 3-5 steps with a railing?: 2  Basic Mobility Total Score: 17       Treatment & Education:  Upon PT entry pt with noted blood on sheets near LUE and small amounts of blood still coming from IV site; nursing notified and made present in room to assess.   Patient upset/stressed about possibility of having to be stuck again and declining OOB mobility at this time but eventually agreeable with PT encouragement/education to participate in modified session to sit EOB and perform transfers to standing/few steps to continue progressing functional mobility.   Pt educated on use of call button; pt understanding.     Patient left HOB elevated with all lines intact, call button in reach, bed alarm on, and Nurse notified.    GOALS:   Multidisciplinary Problems       Physical Therapy Goals          Problem: Physical Therapy    Goal Priority Disciplines Outcome Interventions   Physical Therapy Goal     PT, PT/OT Progressing    Description: Goals to be met by: 1/15/25     Patient will increase functional independence with mobility by performin. Supine to sit with Stand-by Assistance  2. Sit to supine with Stand-by Assistance  3. Sit to stand transfer with Stand-by Assistance with use of RW.   4. Bed to chair transfer with Stand-by Assistance using Rolling Walker  5. Gait  x 50 feet with Stand-by Assistance using Rolling Walker.                            Time Tracking:     PT Received On:  01/02/25  PT Start Time: 1459     PT Stop Time: 1532  PT Total Time (min): 33 min     Billable Minutes: Therapeutic Activity 25    Treatment Type: Treatment  PT/PTA: PT     Number of PTA visits since last PT visit: 0     01/02/2025

## 2025-01-02 NOTE — PROGRESS NOTES
[facility] Medicine Progress Note    Primary Team: [facility] Hospitalist Team B  Attending Physician: Darvin Cr MD   Resident: Keith Wright MD  Intern: Helen Encarnacion MD    Subjective/Interval History     No complaints this morning. Pain controlled w tylenol. Working with PT/OT pending SNF placement.      Objective   Last 24 Hour Vital Signs:  BP  Min: 99/54  Max: 149/62  Temp  Av.2 °F (36.8 °C)  Min: 97.9 °F (36.6 °C)  Max: 98.7 °F (37.1 °C)  Pulse  Av.4  Min: 54  Max: 71  Resp  Av.6  Min: 16  Max: 20  SpO2  Av.7 %  Min: 95 %  Max: 98 %  I/O last 3 completed shifts:  In: 236 [P.O.:236]  Out: 1590 [Urine:1590]    Physical Examination:  General: Sitting up in bed in NAD  CV: Normal rate. Regular rhythm.  Pulm: CTAB. Normal work of breathing on RA   Abd: Soft, non tender, non distended, normoactive bowel sounds.   Msk: L hip tenderness and decreased ROM   Extremities: atraumatic, no deformities, no edema.  Skin: Dry, warm, intact. No bruising or rashes.  Neuro: AAOx4, no focal deficits     Labs, Micro, and Imaging:  I have independently reviewed data.     Assessment & Plan     Taiwo Boyd is a 89 y.o. female admitted for L pubic rami fractures after a mechanical fall. Ortho rec conservative treatment and weight bearing to tolerance. Working with PT/OT and pain controlled w tylenol. Pending SNF placement.     L pubic rami fractures  - Imaging showed mildly displaced fracture of L superior and inferior pubic rami  - Ortho consulted - No operative intervention necessary  - Continue PT/OT, weight bearing to tolerance  - Pain control with tylenol and prn tramadol   - Outpatient ortho follow up  - Pending SNF placement     Hypertension  - Home meds: Coreg 3.125 mg BID, HCTZ 12.5 mg, Irbesartan 300 mg daily  - Stopped coreg and HCTZ  - Continue Losartan while inpatient (irbesartan not on formulary)     Normocytic anemia   - Hgb 9.7, MCV 91   - Ferritin, iron panel, B12, folate pending      Hyperlipidemia  - Continue Lipitor 40 mg nightly    Code Status: Full  DVT Prophylaxis: Lovenox   Diet: Cardiac   Disposition: Pending SNF placement    Case will be discussed with attending physician and attestation to follow.    Helen Encarnacion MD  [facility] Internal Medicine, PGY-I  [facility] Hospitalist Team B      [facility] Medicine Hospitalist Pager numbers:   [facility] Hospitalist Medicine Team A (Maury/Bart): [phone]  [facility] Hospitalist Medicine Team B (Kunal/Shaye):  [phone]

## 2025-01-03 ENCOUNTER — HOSPITAL ENCOUNTER (INPATIENT)
Facility: HOSPITAL | Age: 89
LOS: 14 days | Discharge: HOME HEALTH | DRG: 560 | End: 2025-01-17
Attending: HOSPITALIST | Admitting: HOSPITALIST
Payer: MEDICARE

## 2025-01-03 VITALS
OXYGEN SATURATION: 97 % | DIASTOLIC BLOOD PRESSURE: 64 MMHG | RESPIRATION RATE: 18 BRPM | HEART RATE: 54 BPM | WEIGHT: 148.81 LBS | SYSTOLIC BLOOD PRESSURE: 119 MMHG | BODY MASS INDEX: 26.37 KG/M2 | TEMPERATURE: 98 F | HEIGHT: 63 IN

## 2025-01-03 DIAGNOSIS — S32.592D CLOSED FRACTURE OF RAMUS OF LEFT PUBIS WITH ROUTINE HEALING, SUBSEQUENT ENCOUNTER: Primary | ICD-10-CM

## 2025-01-03 DIAGNOSIS — S32.592A: ICD-10-CM

## 2025-01-03 DIAGNOSIS — I10 ESSENTIAL HYPERTENSION: ICD-10-CM

## 2025-01-03 DIAGNOSIS — S32.592A CLOSED FRACTURE OF RAMUS OF LEFT PUBIS, INITIAL ENCOUNTER: ICD-10-CM

## 2025-01-03 LAB
ALBUMIN SERPL BCP-MCNC: 2.9 G/DL (ref 3.5–5.2)
ALP SERPL-CCNC: 58 U/L (ref 40–150)
ALT SERPL W/O P-5'-P-CCNC: 27 U/L (ref 10–44)
ANION GAP SERPL CALC-SCNC: 10 MMOL/L (ref 8–16)
AST SERPL-CCNC: 24 U/L (ref 10–40)
BASOPHILS # BLD AUTO: 0.02 K/UL (ref 0–0.2)
BASOPHILS NFR BLD: 0.4 % (ref 0–1.9)
BILIRUB SERPL-MCNC: 0.5 MG/DL (ref 0.1–1)
BUN SERPL-MCNC: 56 MG/DL (ref 8–23)
CALCIUM SERPL-MCNC: 9 MG/DL (ref 8.7–10.5)
CHLORIDE SERPL-SCNC: 107 MMOL/L (ref 95–110)
CO2 SERPL-SCNC: 23 MMOL/L (ref 23–29)
CREAT SERPL-MCNC: 1.2 MG/DL (ref 0.5–1.4)
DIFFERENTIAL METHOD BLD: ABNORMAL
EOSINOPHIL # BLD AUTO: 0.2 K/UL (ref 0–0.5)
EOSINOPHIL NFR BLD: 4.7 % (ref 0–8)
ERYTHROCYTE [DISTWIDTH] IN BLOOD BY AUTOMATED COUNT: 13.4 % (ref 11.5–14.5)
EST. GFR  (NO RACE VARIABLE): 44 ML/MIN/1.73 M^2
GLUCOSE SERPL-MCNC: 87 MG/DL (ref 70–110)
HCT VFR BLD AUTO: 28.2 % (ref 37–48.5)
HGB BLD-MCNC: 9.4 G/DL (ref 12–16)
IMM GRANULOCYTES # BLD AUTO: 0.01 K/UL (ref 0–0.04)
IMM GRANULOCYTES NFR BLD AUTO: 0.2 % (ref 0–0.5)
LYMPHOCYTES # BLD AUTO: 1.7 K/UL (ref 1–4.8)
LYMPHOCYTES NFR BLD: 33.9 % (ref 18–48)
MAGNESIUM SERPL-MCNC: 2.1 MG/DL (ref 1.6–2.6)
MCH RBC QN AUTO: 30.6 PG (ref 27–31)
MCHC RBC AUTO-ENTMCNC: 33.3 G/DL (ref 32–36)
MCV RBC AUTO: 92 FL (ref 82–98)
MONOCYTES # BLD AUTO: 0.6 K/UL (ref 0.3–1)
MONOCYTES NFR BLD: 11.9 % (ref 4–15)
NEUTROPHILS # BLD AUTO: 2.5 K/UL (ref 1.8–7.7)
NEUTROPHILS NFR BLD: 48.9 % (ref 38–73)
NRBC BLD-RTO: 0 /100 WBC
PHOSPHATE SERPL-MCNC: 3.9 MG/DL (ref 2.7–4.5)
PLATELET # BLD AUTO: 210 K/UL (ref 150–450)
PMV BLD AUTO: 11.8 FL (ref 9.2–12.9)
POTASSIUM SERPL-SCNC: 4.5 MMOL/L (ref 3.5–5.1)
PROT SERPL-MCNC: 5.5 G/DL (ref 6–8.4)
RBC # BLD AUTO: 3.07 M/UL (ref 4–5.4)
SODIUM SERPL-SCNC: 140 MMOL/L (ref 136–145)
WBC # BLD AUTO: 5.11 K/UL (ref 3.9–12.7)

## 2025-01-03 PROCEDURE — 97530 THERAPEUTIC ACTIVITIES: CPT | Mod: CO

## 2025-01-03 PROCEDURE — 36415 COLL VENOUS BLD VENIPUNCTURE: CPT

## 2025-01-03 PROCEDURE — 25000003 PHARM REV CODE 250

## 2025-01-03 PROCEDURE — 85025 COMPLETE CBC W/AUTO DIFF WBC: CPT

## 2025-01-03 PROCEDURE — 63600175 PHARM REV CODE 636 W HCPCS: Performed by: NURSE PRACTITIONER

## 2025-01-03 PROCEDURE — 25000003 PHARM REV CODE 250: Performed by: HOSPITALIST

## 2025-01-03 PROCEDURE — 83735 ASSAY OF MAGNESIUM: CPT

## 2025-01-03 PROCEDURE — 84100 ASSAY OF PHOSPHORUS: CPT

## 2025-01-03 PROCEDURE — 80053 COMPREHEN METABOLIC PANEL: CPT

## 2025-01-03 PROCEDURE — 11000004 HC SNF PRIVATE

## 2025-01-03 RX ORDER — CALCIUM CARBONATE 200(500)MG
500 TABLET,CHEWABLE ORAL 2 TIMES DAILY PRN
Status: DISCONTINUED | OUTPATIENT
Start: 2025-01-03 | End: 2025-01-17 | Stop reason: HOSPADM

## 2025-01-03 RX ORDER — TALC
6 POWDER (GRAM) TOPICAL NIGHTLY PRN
Status: DISCONTINUED | OUTPATIENT
Start: 2025-01-03 | End: 2025-01-17 | Stop reason: HOSPADM

## 2025-01-03 RX ORDER — HYDROCHLOROTHIAZIDE 12.5 MG/1
12.5 TABLET ORAL DAILY
Status: DISCONTINUED | OUTPATIENT
Start: 2025-01-04 | End: 2025-01-03

## 2025-01-03 RX ORDER — CARVEDILOL 3.12 MG/1
3.12 TABLET ORAL 2 TIMES DAILY
Status: DISCONTINUED | OUTPATIENT
Start: 2025-01-03 | End: 2025-01-03

## 2025-01-03 RX ORDER — ACETAMINOPHEN 500 MG
500 TABLET ORAL 4 TIMES DAILY
Status: DISCONTINUED | OUTPATIENT
Start: 2025-01-03 | End: 2025-01-04

## 2025-01-03 RX ORDER — ATORVASTATIN CALCIUM 40 MG/1
40 TABLET, FILM COATED ORAL DAILY
Status: DISCONTINUED | OUTPATIENT
Start: 2025-01-04 | End: 2025-01-06

## 2025-01-03 RX ORDER — LANOLIN ALCOHOL/MO/W.PET/CERES
1 CREAM (GRAM) TOPICAL DAILY
Status: DISCONTINUED | OUTPATIENT
Start: 2025-01-04 | End: 2025-01-17 | Stop reason: HOSPADM

## 2025-01-03 RX ORDER — LOSARTAN POTASSIUM 50 MG/1
100 TABLET ORAL DAILY
Status: DISCONTINUED | OUTPATIENT
Start: 2025-01-04 | End: 2025-01-06

## 2025-01-03 RX ORDER — ENOXAPARIN SODIUM 100 MG/ML
40 INJECTION SUBCUTANEOUS EVERY 24 HOURS
Status: DISCONTINUED | OUTPATIENT
Start: 2025-01-03 | End: 2025-01-17 | Stop reason: HOSPADM

## 2025-01-03 RX ORDER — ACETAMINOPHEN 325 MG/1
650 TABLET ORAL EVERY 6 HOURS PRN
Status: DISCONTINUED | OUTPATIENT
Start: 2025-01-03 | End: 2025-01-17 | Stop reason: HOSPADM

## 2025-01-03 RX ORDER — ERGOCALCIFEROL 1.25 MG/1
50000 CAPSULE ORAL
Status: DISCONTINUED | OUTPATIENT
Start: 2025-02-03 | End: 2025-01-17 | Stop reason: HOSPADM

## 2025-01-03 RX ORDER — AMOXICILLIN 250 MG
1 CAPSULE ORAL 2 TIMES DAILY
Status: DISCONTINUED | OUTPATIENT
Start: 2025-01-03 | End: 2025-01-17 | Stop reason: HOSPADM

## 2025-01-03 RX ADMIN — LOSARTAN POTASSIUM 100 MG: 50 TABLET, FILM COATED ORAL at 08:01

## 2025-01-03 RX ADMIN — ACETAMINOPHEN 500 MG: 500 TABLET ORAL at 05:01

## 2025-01-03 RX ADMIN — ACETAMINOPHEN 500 MG: 500 TABLET ORAL at 12:01

## 2025-01-03 RX ADMIN — ACETAMINOPHEN 500 MG: 500 TABLET ORAL at 08:01

## 2025-01-03 RX ADMIN — ENOXAPARIN SODIUM 40 MG: 40 INJECTION SUBCUTANEOUS at 08:01

## 2025-01-03 NOTE — PLAN OF CARE
Problem: Adult Inpatient Plan of Care  Goal: Plan of Care Review  Outcome: Progressing  Goal: Patient-Specific Goal (Individualized)  Outcome: Progressing  Goal: Absence of Hospital-Acquired Illness or Injury  Outcome: Progressing  Goal: Optimal Comfort and Wellbeing  Outcome: Progressing  Goal: Readiness for Transition of Care  Outcome: Progressing     Problem: Comorbidity Management  Goal: Blood Pressure in Desired Range  Outcome: Progressing     Problem: Orthopaedic Fracture  Goal: Absence of Bleeding  Outcome: Progressing  Goal: Bowel Elimination  Outcome: Progressing  Goal: Absence of Embolism Signs and Symptoms  Outcome: Progressing  Goal: Fracture Stability  Outcome: Progressing  Goal: Optimal Functional Ability  Outcome: Progressing  Goal: Absence of Infection Signs and Symptoms  Outcome: Progressing  Goal: Effective Tissue Perfusion  Outcome: Progressing  Goal: Optimal Pain Control and Function  Outcome: Progressing  Goal: Effective Oxygenation and Ventilation  Outcome: Progressing     Problem: Fall Injury Risk  Goal: Absence of Fall and Fall-Related Injury  Outcome: Progressing

## 2025-01-03 NOTE — NURSING
Attempted to call report to [facility]. Left number for nurse to call back when ready, states it is currently very busy there. IV removed. AVS placed in transfer packet.

## 2025-01-03 NOTE — PROGRESS NOTES
The sw met with the pt and she signed the Pt Choice form. The sw gave her a copy and placed the original in her chart. The pt's in agreement with the d/c plan to go to [facility] today. The pt has no further questions or Case Management needs. The pt's clear to d/c once transport arrives.     Future Appointments   Date Time Provider Department Center   1/14/2025 10:45 AM [facility] XR-A LIMIT 350 LBS [facility] XRAY OP [facility]   1/14/2025 11:30 AM Manny Rodriguez MD [facility] [id number]   3/10/2025 11:00 AM Poonam Alexis MD [facility]   4/14/2025 11:00 AM Fernando Lamas MD [facility] - Telemetry  Discharge Final Note    Primary Care Provider: Fernando Lamas MD    Expected Discharge Date: 1/3/2025    Final Discharge Note (most recent)       Final Note - 12/31/24 0946          Post-Acute Status    Post-Acute Authorization Placement     Post-Acute Placement Status Patient List Provided                     Important Message from Medicare  Important Message from Medicare regarding Discharge Appeal Rights: Explained to patient/caregiver, Signed/date by patient/caregiver     Date IMM was signed: 01/02/25  Time IMM was signed: 0954    Contact Info       *[facility]   Specialty: Skilled Nursing Facility    [address] [place] [address], 3RD FLOOR  [facility] [address]   Phone: [phone]       Next Steps: Follow up    Instructions: The pt will d/c to the facility listed above for therapy.    Manny Rodriguez MD   Specialty: Orthopedic Surgery    [address]  [address]  [facility] [address]   Phone: [phone]       Next Steps: Follow up on 1/14/2025    Instructions: 11:30am

## 2025-01-03 NOTE — DISCHARGE SUMMARY
[facility] Medicine Discharge Summary    Primary Team: [facility] Hospitalist Team B  Attending Physician: Darvin Cr MD  Resident: Lyssa  Intern: Samantha    Date of Admit: 12/28/2024  Date of Discharge: 1/3/2025    Discharge to: SNF  Condition: Stable    Discharge Diagnoses     Patient Active Problem List   Diagnosis    Vitamin D deficiency    High cholesterol    Essential hypertension    Secondary hyperparathyroidism of renal origin    Fracture of ramus of left pubis       Consultants and Procedures     Consultants:  Orthopedic surgery    Procedures:   N/a    Imaging:  Xray Hip:  Impression:     Mildly displaced fractures of the left superior and inferior pubic rami.    CT abdomen/pelvis:    1. Mildly displaced fractures of the left superior and inferior pubic rami.  2. Suspected nondisplaced S5 anterior cortex fracture.  3. Small amount of hemorrhage in the left anterior pelvis.      Brief History of Present Illness & Summary of Hospital Course      Taiwo Boyd is a 89 y.o.female with a PMHx of Iron Deficiency Anemia in stage 3 chronic kidney disease (4/19/2018), chronic kidney disease) stage 3, GFR 30-59 ml/min, Hypertension, Hyperlipidemia, and Secondary hyperparathyroidism who presented on 12/28/2024 for a fall on her left hip. In the ED, CT of left hip demonstrated mildly displaced fractures of the left superior and inferior pubic rami. Orthopedics was consulted and recommended no operative intervention at this time. PT/OT were consulted and recommended SNF placement. Throughout admission, pt was asymptomatically bradycardiac, with HRs in the 50s, and the decision was made to discontinue coreg and HCTZ. On the day of discharge, patient was afebrile with stable vital signs and will be discharged in stable condition to SNF with close follow up.     For the full HPI please refer to the History & Physical from this admission.    Objective:  Vitals:    01/03/25 0748   BP: 119/64   Pulse: (!) 54   Resp: 18    Temp: 97.9 °F (36.6 °C)     Physical Exam  Constitutional:       Appearance: Normal appearance.   Cardiovascular:      Rate and Rhythm: Normal rate and regular rhythm.      Pulses: Normal pulses.      Heart sounds: Normal heart sounds.   Pulmonary:      Effort: Pulmonary effort is normal. No respiratory distress.      Breath sounds: Normal breath sounds. No wheezing.   Abdominal:      General: Abdomen is flat. Bowel sounds are normal.      Palpations: Abdomen is soft.   Musculoskeletal:      Right lower leg: No edema.      Left lower leg: No edema.   Neurological:      General: No focal deficit present.      Mental Status: She is alert.           Hospital Course By Problem with Pertinent Findings     L pubic rami fractures  - Imaging showed mildly displaced fracture of L superior and inferior pubic rami  - Ortho consulted - No operative intervention necessary  - Continue PT/OT, weight bearing to tolerance  - Pain control with tylenol and prn tramadol   - Outpatient ortho follow up  - SNF placement      Hypertension  - Home meds: Coreg 3.125 mg BID, HCTZ 12.5 mg, Irbesartan 300 mg daily  - Stopped coreg and HCTZ  - Continue Losartan while inpatient (irbesartan not on formulary)      Normocytic anemia   - Hgb 9.7, MCV 91   - Ferritin, iron panel, B12, folate pending     Hyperlipidemia  - Continue Lipitor 40 mg nightly    Discharge Medications        Medication List        START taking these medications      acetaminophen 500 MG tablet  Commonly known as: TYLENOL  Take 1 tablet (500 mg total) by mouth every 6 (six) hours.            CONTINUE taking these medications      atorvastatin 40 MG tablet  Commonly known as: LIPITOR  Take 1 tablet by mouth once daily     carvediloL 3.125 MG tablet  Commonly known as: COREG  Take 1 tablet by mouth twice daily     ergocalciferol 50,000 unit Cap  Commonly known as: ERGOCALCIFEROL  Take 1 capsule (50,000 Units total) by mouth every 30 days.     ferrous sulfate 325 mg (65 mg iron)  Tab tablet  Commonly known as: FEOSOL     hydroCHLOROthiazide 12.5 MG Tab  Commonly known as: HYDRODIURIL  TAKE 1 TABLET BY MOUTH ONCE DAILY IN THE MORNING     irbesartan 300 MG tablet  Commonly known as: AVAPRO  Take 1 tablet by mouth once daily               Where to Get Your Medications        Information about where to get these medications is not yet available    Ask your nurse or doctor about these medications  acetaminophen 500 MG tablet          Discharge Information:   Diet:  Regular    Physical Activity:  As tolerated             Instructions:  1. Take all medications as prescribed  2. Keep all follow-up appointments  3. Return to the hospital or call your primary care physicians if any worsening symptoms such as fever, chest pain, shortness of breath, return of symptoms, or any other concerns.    Follow-Up Appointments:  PCP  Orthopedic surgery     Ledy Singh MD  [facility] Medicine PGY-1

## 2025-01-03 NOTE — PLAN OF CARE
VN note: VN completed AVS and attachments and notified bedside nurseJen. Will cont to be available and intervene prn.

## 2025-01-03 NOTE — PT/OT/SLP PROGRESS
Occupational Therapy   Treatment    Name: Taiwo Boyd  MRN: [id number]  Admitting Diagnosis:  Fracture of ramus of left pubis       Recommendations:     Discharge Recommendations: Moderate Intensity Therapy  Discharge Equipment Recommendations:  to be determined by next level of care  Barriers to discharge:  None    Assessment:     Taiwo Boyd is a 89 y.o. female with a medical diagnosis of Fracture of ramus of left pubis.  Performance deficits affecting function are weakness, impaired endurance, impaired self care skills, impaired functional mobility, gait instability, impaired balance, decreased coordination, decreased lower extremity function, decreased safety awareness, pain, decreased ROM, orthopedic precautions.     Rehab Prognosis:  Good; patient would benefit from acute skilled OT services to address these deficits and reach maximum level of function.       Plan:     Patient to be seen 5 x/week to address the above listed problems via self-care/home management, therapeutic activities, therapeutic exercises  Plan of Care Expires: 01/28/25  Plan of Care Reviewed with: patient    Subjective     Chief Complaint: pain with movement  Patient/Family Comments/goals: return to PLOF  Pain/Comfort:  Pain Rating 1:  (none at rest)  Location 1:  (groin area)  Pain Addressed 1: Reposition, Distraction  Pain Rating Post-Intervention 1:  (not rated)    Objective:     Communicated with: nurse prior to session.  Patient found HOB elevated with bed alarm upon OT entry to room.    General Precautions: Standard, fall    Orthopedic Precautions:LLE weight bearing as tolerated  Braces: N/A  Respiratory Status: Room air    Bed Mobility:    Patient completed Scooting/Bridging with contact guard assistance  Patient completed Supine to Sit with Elayne to manage LLE  Patient completed Sit to Supine with moderate assistance and with leg lift     Functional Mobility/Transfers:  Patient completed Sit <> Stand Transfer with minimum  assistance  with  rolling walker   Functional Mobility: ambulated using RW /c CGA; slow, antalgic gait noted; requires VCs for RW mgmt/proximity, as well as proper sequence    Activities of Daily Living:  Patient already dressed; denied need for toileting and G/H    AMPAC 6 Click ADL:      Treatment & Education:  Educated on purpose/role of OT  Increased time discussing post-acute recs and expectations  Increased time, effort and VCs for all tasks/transitions    Patient left HOB elevated with all lines intact, call button in reach, and bed alarm on    GOALS:   Multidisciplinary Problems       Occupational Therapy Goals          Problem: Occupational Therapy    Goal Priority Disciplines Outcome Interventions   Occupational Therapy Goal     OT, PT/OT Progressing    Description: Goals to be met by: 01/28/25     Patient will increase functional independence with ADLs by performing:    UE Dressing with Modified [place].  LE Dressing with Modified [place].  Grooming while standing at sink with Modified [place].  Toileting from toilet with Modified [place] for hygiene and clothing management.   Toilet transfer to toilet with Modified [place].                         Time Tracking:     OT Date of Treatment: 01/03/25  OT Start Time: 1101  OT Stop Time: 1134  OT Total Time (min): 33 min    Billable Minutes:Therapeutic Activity 33               1/3/2025

## 2025-01-04 PROCEDURE — 97165 OT EVAL LOW COMPLEX 30 MIN: CPT

## 2025-01-04 PROCEDURE — 11000004 HC SNF PRIVATE

## 2025-01-04 PROCEDURE — 25000003 PHARM REV CODE 250: Performed by: HOSPITALIST

## 2025-01-04 PROCEDURE — 97530 THERAPEUTIC ACTIVITIES: CPT

## 2025-01-04 PROCEDURE — 97110 THERAPEUTIC EXERCISES: CPT

## 2025-01-04 PROCEDURE — 63600175 PHARM REV CODE 636 W HCPCS: Performed by: NURSE PRACTITIONER

## 2025-01-04 PROCEDURE — 25000003 PHARM REV CODE 250: Performed by: NURSE PRACTITIONER

## 2025-01-04 PROCEDURE — 97162 PT EVAL MOD COMPLEX 30 MIN: CPT

## 2025-01-04 PROCEDURE — 97535 SELF CARE MNGMENT TRAINING: CPT

## 2025-01-04 RX ORDER — ACETAMINOPHEN 500 MG
1000 TABLET ORAL 4 TIMES DAILY
Status: DISCONTINUED | OUTPATIENT
Start: 2025-01-04 | End: 2025-01-06

## 2025-01-04 RX ORDER — TRAMADOL HYDROCHLORIDE 50 MG/1
50 TABLET ORAL EVERY 6 HOURS PRN
Status: DISCONTINUED | OUTPATIENT
Start: 2025-01-04 | End: 2025-01-17 | Stop reason: HOSPADM

## 2025-01-04 RX ORDER — TRAMADOL HYDROCHLORIDE 50 MG/1
100 TABLET ORAL EVERY 6 HOURS PRN
Status: DISCONTINUED | OUTPATIENT
Start: 2025-01-04 | End: 2025-01-09

## 2025-01-04 RX ADMIN — ACETAMINOPHEN 1000 MG: 500 TABLET ORAL at 01:01

## 2025-01-04 RX ADMIN — ACETAMINOPHEN 1000 MG: 500 TABLET ORAL at 08:01

## 2025-01-04 RX ADMIN — ACETAMINOPHEN 500 MG: 500 TABLET ORAL at 08:01

## 2025-01-04 RX ADMIN — ACETAMINOPHEN 1000 MG: 500 TABLET ORAL at 05:01

## 2025-01-04 RX ADMIN — SENNOSIDES AND DOCUSATE SODIUM 1 TABLET: 50; 8.6 TABLET ORAL at 08:01

## 2025-01-04 RX ADMIN — LOSARTAN POTASSIUM 100 MG: 50 TABLET, FILM COATED ORAL at 08:01

## 2025-01-04 RX ADMIN — ATORVASTATIN CALCIUM 40 MG: 40 TABLET, FILM COATED ORAL at 08:01

## 2025-01-04 RX ADMIN — FERROUS SULFATE TAB 325 MG (65 MG ELEMENTAL FE) 1 EACH: 325 (65 FE) TAB at 08:01

## 2025-01-04 RX ADMIN — ENOXAPARIN SODIUM 40 MG: 40 INJECTION SUBCUTANEOUS at 05:01

## 2025-01-04 NOTE — PT/OT/SLP EVAL
Occupational Therapy   Evaluation    Name: Taiwo Boyd  MRN: [id number]  Admit Date: 1/3/2025  Recent Surgeries: N/A     General Precautions: Standard, fall  Orthopedic Precautions:LLE weight bearing as tolerated   Braces: N/A    Recommendations:     Discharge Recommendations: home health OT  Level of Assistance Recommended: 24 hours light assistance  Discharge Equipment Recommendations:  3-in-1 commode, bath bench, hip kit  Barriers to discharge:  Decreased caregiver support    Assessment:     Taiwo Boyd is a 89 y.o. female with a medical diagnosis of  L pubis fx. .  She presents with pain, decreased standing balance and tolerance, limited activity tolerance, and decreased participation in ADLs and functional mobility tasks. Pt with good tolerance to session on this date and is completing ADLs with mod A to mod I level. Pt is pleasant and motivated to return to PLOF, and would benefit from skilled OT servives to improve ADL I and increase overall functional capacity. Performance deficits affecting function: weakness, impaired endurance, impaired self care skills, impaired functional mobility, gait instability, impaired balance, decreased coordination, decreased lower extremity function, decreased safety awareness, pain, impaired coordination, impaired cardiopulmonary response to activity.      Rehab Potential is good    Activity Tolerance: Fair    Plan:     Patient to be seen 5 x/week to address the above listed problems via self-care/home management, therapeutic activities, therapeutic exercises, neuromuscular re-education  Plan of Care Expires: 01/21/25  Plan of Care Reviewed with: patient    Subjective     Chief Complaint: leg weakness and pain  Patient/Family Comments/goals: to get legs stronger.     Occupational Profile:  Living Environment: [facility] house with 6 inch raised porch and threshold to get into house. Tub/shower combo with grab bars. Lives alone.  Previous level of function: Mod I with intermitt use  of quad cane  Roles and Routines: homemaker, mother, likes puzzle books. Does not drive  Equipment Used at Home: cane, quad, walker, rolling  Assistance upon Discharge: intermitt from daughter    Pain/Comfort:  Pain Rating 1: 0/10 (pt says no pain when not moving)    Patients cultural, spiritual, Scientologist conflicts given the current situation: no    Objective:     Communicated with: NSG prior to session.  Patient found HOB elevated with PureWick upon OT entry to room.    Occupational Performance:    01/04/25 0936   Prior Functioning: Everyday Activities   Self Care Independent   Indoor Mobility (Ambulation) Independent   Stairs Independent   Functional Cognition Independent   Prior Device Use None of the given options  (quad cane intermittantly)   Functional Limitation in Range of Motion   Upper Extremity No impairment   Lower Extremity Impairment on one side   Mobility Devices Walker;Wheelchair (manual or electric)   Eating   Was the activity attempted? Yes   Was the activity done independently? Yes   Was adaptive equipment used? Yes  (W/C)   CARE Score - Eating 6   Oral Hygiene   Was the activity attempted? Yes   Was the activity done independently? No   Assistance Needed Setup / clean-up  (opening items)   Was adaptive equipment used? Yes  (seated in W/C)   CARE Score - Oral Hygiene 5   Toileting Hygiene   Was the activity attempted? Yes   Was the activity done independently? No   Assistance Needed Touching assistance   Was adaptive equipment used? Yes  (grab bars)   CARE Score - Toileting Hygiene 4   Shower/Bathe Self   Was the activity attempted? Yes   Was the activity done independently? No   Assistance Needed Physical assistance   Physical Assistance Level Less than half  (min A for washing and drying feet)   Was adaptive equipment used? Yes  (seated in W/C)   CARE Score - Shower/Bathe Self 3   Upper Body Dressing   Was the activity attempted? Yes   Was the activity done independently? No   Assistance  Needed Setup / clean-up   Was adaptive equipment used? Yes  (seated in W/C)   CARE Score - Upper Body Dressing 5   Lower Body Dressing   Was the activity attempted? Yes   Was the activity done independently? No   Assistance Needed Touching assistance   Was adaptive equipment used? Yes  (W/C)   CARE Score - Lower Body Dressing 4   Putting On/Taking Off Footwear   Was the activity attempted? Yes   Was the activity done independently? No   Assistance Needed Physical assistance   Physical Assistance Level Less than half  (mod A for donning and doffing L sock)   Was adaptive equipment used? Yes  (W/C)   CARE Score - Putting On/Taking Off Footwear 3   Personal Hygiene   Was the activity attempted? Yes   Was the activity done independently? No   Assistance Needed Setup / clean-up   Was adaptive equipment used? Yes  (W/C)   CARE Score - Personal Hygiene 5   Lying to Sitting on Side of Bed   Was the activity attempted? Yes   Was the activity done independently? No   Assistance Needed Physical assistance   Physical Assistance Level Less than half  (mod  A for BLE)   Was adaptive equipment used? Yes  (handrails)   CARE Score - Lying to Sitting on Side of Bed 3   Sit to Stand   Was the activity attempted? Yes   Was the activity done independently? No   Assistance Needed Touching assistance   Was adaptive equipment used? Yes  (RW, W/C)   CARE Score - Sit to Stand 4   Chair/Bed-to-Chair Transfer   Was the activity attempted? Yes   Was the activity done independently? No   Assistance Needed Touching assistance   Was adaptive equipment used? Yes  (W/C and RW)   CARE Score - Chair/Bed-to-Chair Transfer 4   Toilet Transfer   Was the activity attempted? Yes   Was the activity done independently? No   Assistance Needed Touching assistance   Was adaptive equipment used? Yes  (W/C and grab bars)   CARE Score - Toilet Transfer 4   Tub/Shower Transfer   Was the activity attempted? No   Reason if not Attempted Safety concerns   CARE Score -  Tub/Shower Transfer 88         Visual Perceptual:  Cognitive/Psychosocial Skills:     -       Oriented to: Person, Place, Time, and Situation   -       Follows Commands/attention:Follows multistep  commands  -       Communication: clear/fluent  -       Memory: Poor immediate recall  -       Safety awareness/insight to disability: impaired   -       Mood/Affect/Coping skills/emotional control: Appropriate to situation  Visual/Perceptual:      -Intact      Physical Exam:  Balance:    -       SPV seated ,CGA standing  Postural examination/scapula alignment:    -       Rounded shoulders  -       Forward head  Skin integrity: Bruising of BUE  Edema:  None noted  Sensation:    -       Intact  Motor Planning:    -       WFL  Dominant hand:    -       RUE  Upper Extremity Range of Motion:   WNL  Upper Extremity Strength:  WNL   Strength:    -       Right Upper Extremity: WNL  -       Left Upper Extremity: WNL  Fine Motor Coordination:    -       Intact  Left hand, finger to nose, Right hand, finger to nose, Left hand thumb/finger opposition skills, Right hand thumb/finger opposition skills, Left hand, manipulation of objects, and Right hand, manipulation of objects  Gross motor coordination:WFL    AMPAC 6 Click ADL:  AMPAC Total Score: 20    Treatment & Education:  Role of OT  -OT Poc  -safety awareness and precautions  -Level of A required for ADLs and functional mobility  -AE used for ADL completion  -importance of OOB activity and energy conservation      Patient left up in chair with all lines intact, call button in reach, and NSG notified    GOALS:   Multidisciplinary Problems       Occupational Therapy Goals          Problem: Occupational Therapy    Goal Priority Disciplines Outcome Interventions   Occupational Therapy Goal     OT, PT/OT Progressing    Description: Goals to be met by: 2/3/2025     Patient will increase functional independence with ADLs by performing:    UE Dressing with Set up A.  LE Dressing with  Set-up Assistance.  Grooming while seated at sink with Modified [place].  Toileting from toilet with Mod I for hygiene and clothing management.   Bathing from  shower chair/bench with Supervision.  Toilet transfer to toilet with Modified [place].  Pt will tolerate standing for 10 minutes while completing dynamic task with SPV using LRAD.                          History:     Past Medical History:   Diagnosis Date    Anemia in stage 3 chronic kidney disease 4/19/2018    CKD (chronic kidney disease) stage 3, GFR 30-59 ml/min     Disorder of kidney and ureter     Hypertension     Hyponatremia     Secondary hyperparathyroidism          Past Surgical History:   Procedure Laterality Date    APPENDECTOMY      HYSTERECTOMY      TONSILLECTOMY         Time Tracking:     OT Date of Treatment: 01/04/25  OT Start Time: 0749  OT Stop Time: 0833  OT Total Time (min): 44 min    Billable Minutes:Evaluation 10 minutes  Self Care/Home Management 34 minutes    1/4/2025

## 2025-01-04 NOTE — PLAN OF CARE
Problem: Occupational Therapy  Goal: Occupational Therapy Goal  Description: Goals to be met by: 2/3/2025     Patient will increase functional independence with ADLs by performing:    UE Dressing with Set up A.  LE Dressing with Set-up Assistance.  Grooming while seated at sink with Modified [place].  Toileting from toilet with Mod I for hygiene and clothing management.   Bathing from  shower chair/bench with Supervision.  Toilet transfer to toilet with Modified [place].  Pt will tolerate standing for 10 minutes while completing dynamic task with SPV using LRAD.   FWM with mod I using AE as needed.     1/4/2025 1452 by Coty Melendez, OT  Outcome: Progressing  1/4/2025 1138 by Coty Melendez, OT  Outcome: Progressing

## 2025-01-04 NOTE — PLAN OF CARE
Problem: Occupational Therapy  Goal: Occupational Therapy Goal  Description: Goals to be met by: 2/3/2025     Patient will increase functional independence with ADLs by performing:    UE Dressing with Set up A.  LE Dressing with Set-up Assistance.  Grooming while seated at sink with Modified [place].  Toileting from toilet with Mod I for hygiene and clothing management.   Bathing from  shower chair/bench with Supervision.  Toilet transfer to toilet with Modified [place].  Pt will tolerate standing for 10 minutes while completing dynamic task with SPV using LRAD.     Outcome: Progressing

## 2025-01-04 NOTE — PLAN OF CARE
Ms Maravilla arrived shortly after 4pm today from [facility].   Alert and oriented.  Able to verbalize needs.  She is post a fall at home.  Skin assessment completed and noted with ecchymosis to bilat arms.  Denies pain to her right medial groin area post pubis fracture.  Tolerated meds without any difficulty.  Call bell within reach while in the room.

## 2025-01-04 NOTE — PLAN OF CARE
Problem: Adult Inpatient Plan of Care  Goal: Plan of Care Review  Outcome: Progressing  Flowsheets (Taken 1/4/2025 1057)  Plan of Care Reviewed With: patient  Goal: Patient-Specific Goal (Individualized)  Outcome: Progressing  Goal: Absence of Hospital-Acquired Illness or Injury  Outcome: Progressing  Goal: Optimal Comfort and Wellbeing  Outcome: Progressing  Goal: Readiness for Transition of Care  Outcome: Progressing     Problem: Fall Injury Risk  Goal: Absence of Fall and Fall-Related Injury  Outcome: Progressing     Problem: Skin Injury Risk Increased  Goal: Skin Health and Integrity  Outcome: Progressing

## 2025-01-04 NOTE — PLAN OF CARE
Problem: Physical Therapy  Goal: Physical Therapy Goal  Description: Goals to be met by: 2/4/25     Patient will increase functional independence with mobility by performing:    . Supine to sit with [place]  . Sit to supine with [place]  . Rolling to Left and Right with [place].  . Sit to stand transfer with [place]  . Bed to chair transfer with Modified [place] using LRAD  . Gait  x 150 feet with Supervision using LRAD  . Wheelchair propulsion x150 feet with Supervision using bilateral uppper extremities  . Ascend/descend 4 stair with bilateral Handrails Stand-by Assistance using No Assistive Device.   . Ascend/Descend 4 inch curb step with Stand-by Assistance using Rolling Walker.    Outcome: Progressing

## 2025-01-04 NOTE — PROGRESS NOTES
"                                                        [facility]                                  Skilled Nursing Facility                   Progress Note     Admit Date: 1/3/2025  PAWAN   [id number] A  Principal Problem:  Fracture of ramus of left pubis   HPI obtained from patient interview and chart review     Chief Complaint: Establish Care/ Initial Visit     HPI:   Taiwo Boyd is a 89 y.o.female with a PMHx of Anemia, CKD stage 3, HTN, HLD, and Secondary hyperparathyroidism who presents to SNF following hospital admission for mildly displaced left superior and inferior pubic rami fractures being treated with conservative management.  Admission to SNF for secondary weakness and debility.    Patient originally presented to [facility] ED on 12/28/2024 after suffering a fall on her left hip.  Per hospital notes,  In the ED, CT of left hip demonstrated mildly displaced fractures of the left superior and inferior pubic rami. Orthopedics was consulted and recommended no operative intervention at this time. PT/OT were consulted and recommended SNF placement. Throughout admission, pt was asymptomatically bradycardiac, with HRs in the 50s, and the decision was made to discontinue coreg and HCTZ. On the day of discharge, patient was afebrile with stable vital signs and will be discharged in stable condition to SNF with close follow up."    Patient was admitted with Coreg and hydrochlorothiazide despite hospital notes stating to discontinue.  Will discontinue these medications as this was likely done in error.  Patient states that her pain is well controlled at this time.  She is doing well with therapy at this time.      Patient will be treated at [facility] with PT and OT to improve functional status and ability to perform ADLs.     Past Medical History: Patient has a past medical history of Anemia in stage 3 chronic kidney disease (4/19/2018), CKD (chronic kidney disease) stage 3, GFR " "30-59 ml/min, Disorder of kidney and ureter, Hypertension, Hyponatremia, and Secondary hyperparathyroidism.    Past Surgical History: Patient has a past surgical history that includes Hysterectomy; Tonsillectomy; and Appendectomy.    Social History: Patient reports that she has never smoked. She has never used smokeless tobacco. She reports that she does not drink alcohol and does not use drugs.    Family History: Family history is unknown by patient.    Allergies: Patient is allergic to codeine phosphate.    ROS  Constitutional: Negative for fever   Eyes: Negative for blurred vision, double vision   Respiratory: Negative for cough, shortness of breath   Cardiovascular: Negative for chest pain, palpitations, and leg swelling.   Gastrointestinal: Negative for abdominal pain, constipation, diarrhea, nausea, vomiting.   Genitourinary: Negative for dysuria, frequency   Musculoskeletal:  + generalized weakness.  +mild intermittent hip pain  Skin: Negative for itching and rash.   Neurological: Negative for dizziness, headaches.   Psychiatric/Behavioral: Negative for depression. The patient is not nervous/anxious.      24 hour Vital Sign Range   Temp:  [97.4 °F (36.3 °C)-98.9 °F (37.2 °C)]   Pulse:  [57-63]   Resp:  [16-18]   BP: (116-135)/(55-65)   SpO2:  [96 %-99 %]     Current BMI: Body mass index is 26.36 kg/m².    PEx  Constitutional: Patient appears debilitated.  No distress noted  Cardiovascular: Normal rate, regular rhythm and normal heart sounds.    Pulmonary/Chest: Effort normal and breath sounds are clear  Abdominal: Soft. Bowel sounds are normal.   Musculoskeletal: Normal range of motion.   Neurological: Alert and oriented to person, place, and time.   Psychiatric: Normal mood and affect. Behavior is normal.   Skin: Skin is warm and dry.     No results for input(s): "GLUCOSE", "NA", "K", "CL", "CO2", "BUN", "CREATININE", "CALCIUM", "MG" in the last 24 hours.  No results for input(s): "WBC", "RBC", "HGB", "HCT", " ""PLT", "MCV", "MCH", "MCHC" in the last 24 hours.  No results for input(s): "POCTGLUCOSE" in the last 168 hours.     Assessment and Plan:    L superior pubic rami fracture  Left inferior pubic rami fracture   s/p ground level fall   - CT scan (12/28) Mildly displaced fractures of the left superior and inferior pubic rami. Suspected nondisplaced S5 anterior cortex fracture. Small amount of hemorrhage in the left anterior pelvis.  - Ortho consulted - "No operative intervention necessary"  - Continue PT/OT, WBAT  - continue acetaminophen 1000 mg QID, initiated tramadol 50 or 100 mg q.6 hours PRN     Hypertension  - Home regimen with  Carvedilol 3.125 mg BID, HCTZ 12.5 mg, Irbesartan 300 mg daily  - was bradycardic in acute setting, carvedilol and HCTZ were discontinued but then ordered on discharge orders  - discontinue Coreg and HCTZ, continue losartan mg daily in place of non formulary irbesartan    CKD stage 3  - follows as outpatient with Nephrology, Dr. Celestin,  last seen on 06/10/2024  - sCr baseline is normal, BUN mildly elevated chronically  - continue monitor twice weekly BMPs, avoid nephrotoxic agents, renally dose medications as appropriate     Normocytic anemia   - Ferritin- 383, iron- 55, B12- 220, folate 8.6  - transfuse for hemoglobin < 7  - continue monitor twice weekly CBC     Hyperlipidemia  - Continue atorvastatin 40 mg qHS    Debility   - Continue with PT/OT for gait training and strengthening and restoration of ADL's   - Encourage mobility, OOB in chair, and early ambulation as appropriate  - Fall precautions   - Monitor for bowel and bladder dysfunction  - Monitor for and prevent skin breakdown and pressure ulcers  - initiated DVT prophylaxis with Lovenox 40 mg daily           Psychiatric Assessment  Patient Health Questionnaire-9 (PHQ-9)    Date: 1/4/25    1. Little interest or pleasure in doing things?   no,  Not at all                       = 0    2. Feeling down, depressed, or hopeless? "   no, Not at all                       = 0    3. Trouble falling or staying asleep, or sleeping too much?   no, Not at all                       = 0    4. Feeling tired or having little energy?   no, Not at all                       = 0    5. Poor appetite or overeating?   no, Not at all                       = 0    6. Feeling bad about yourself- or that you are a failure or have let yourself or your family down?   no, Not at all                       = 0    7. Trouble concentrating on things, such as reading the newspaper or watching TV?   no, Not at all                       = 0    8. Moving or speaking so slowly that other people could have noticed? Or the opposite- being so fidgety or restless that you have been moving around a lot more than usual?   no, Not at all                       = 0    9. Thoughts that you would be better off dead or of hurting yourself in some way?   no, Not at all                       = 0    Total Score: 0     How difficult have these problems made it for you to do your work, take care of things at home, or get along with other people?   no      Screening is  Negative          Anticipate disposition:  Home with home health    IP [facility] RISK OF UNPLANNED READMISSION Model: MODERATE     Follow-up needed during SNF stay-orthopedics (1/14)- will need to go to clinic    Follow-up needed after discharge from SNF: PCP     Future Appointments   Date Time Provider Department Center   1/14/2025 10:45 AM [facility] ODC XR-A LIMIT 350 LBS [facility] XRAY OP [facility]   1/14/2025 11:30 AM Manny Rodriguez MD [facility] [id number] [facility]   3/10/2025 11:00 AM Poonam Alexis MD [facility]   4/14/2025 11:00 AM Fernando Lamas MD [facility]     Advance Care Planning   This was a voluntary visit in the option to decline counseling was given  Length of Conversation:  18 minutes  Topics Discussed: Disease process of recent fall with fracture  Overview of Materials/Resources given(if  applicable):  Discussed code status.  Discussed what it means to be DNR.  Discussed what resuscitative measures are in detail.  All topics recapped and questions answered.  Outcome of Discussion:  Patient would like to remain full code   Individuals present:  Myself and patient  Decision Maker: Taiwo Boyd, patient      I certify that SNF services are required to be given on an inpatient basis because Taiwo Boyd needs for skilled nursing care and/or skilled rehabilitation are required on a daily basis and such services can only practically be provided in a skilled nursing facility setting and are for an ongoing condition for which she received inpatient care in the hospital.     Total time of the visit 131 minutes (does not include ACP time)  Description of non physical exam/non charting time: counseling patient on clinical conditions and therapies provided.  Extensive chart review completed including all consultation notes.  All pertinent laboratory and radiographical images reviewed.  Care coordination with pharmacist.  Home medications and reason for holding discussed with patient as she was concerned      Lorri Bowie NP  Department of Hospital Medicine   [facility]     DOS: 1/4/2025       Patient note was created using MModal Dictation.  Any errors in syntax or even information may not have been identified and edited on initial review prior to signing this note.

## 2025-01-05 PROCEDURE — 25000003 PHARM REV CODE 250: Performed by: HOSPITALIST

## 2025-01-05 PROCEDURE — 25000003 PHARM REV CODE 250: Performed by: NURSE PRACTITIONER

## 2025-01-05 PROCEDURE — 11000004 HC SNF PRIVATE

## 2025-01-05 PROCEDURE — 63600175 PHARM REV CODE 636 W HCPCS: Performed by: NURSE PRACTITIONER

## 2025-01-05 RX ADMIN — ACETAMINOPHEN 1000 MG: 500 TABLET ORAL at 08:01

## 2025-01-05 RX ADMIN — ATORVASTATIN CALCIUM 40 MG: 40 TABLET, FILM COATED ORAL at 09:01

## 2025-01-05 RX ADMIN — ACETAMINOPHEN 1000 MG: 500 TABLET ORAL at 09:01

## 2025-01-05 RX ADMIN — FERROUS SULFATE TAB 325 MG (65 MG ELEMENTAL FE) 1 EACH: 325 (65 FE) TAB at 09:01

## 2025-01-05 RX ADMIN — Medication 6 MG: at 08:01

## 2025-01-05 RX ADMIN — ACETAMINOPHEN 1000 MG: 500 TABLET ORAL at 04:01

## 2025-01-05 RX ADMIN — ENOXAPARIN SODIUM 40 MG: 40 INJECTION SUBCUTANEOUS at 04:01

## 2025-01-05 RX ADMIN — LOSARTAN POTASSIUM 100 MG: 50 TABLET, FILM COATED ORAL at 09:01

## 2025-01-05 RX ADMIN — ACETAMINOPHEN 1000 MG: 500 TABLET ORAL at 01:01

## 2025-01-06 LAB
ALBUMIN SERPL BCP-MCNC: 2.8 G/DL (ref 3.5–5.2)
ALP SERPL-CCNC: 66 U/L (ref 40–150)
ALT SERPL W/O P-5'-P-CCNC: 86 U/L (ref 10–44)
ANION GAP SERPL CALC-SCNC: 6 MMOL/L (ref 8–16)
AST SERPL-CCNC: 70 U/L (ref 10–40)
BASOPHILS # BLD AUTO: 0 K/UL (ref 0–0.2)
BASOPHILS NFR BLD: 0 % (ref 0–1.9)
BILIRUB SERPL-MCNC: 0.4 MG/DL (ref 0.1–1)
BUN SERPL-MCNC: 44 MG/DL (ref 8–23)
CALCIUM SERPL-MCNC: 8.7 MG/DL (ref 8.7–10.5)
CHLORIDE SERPL-SCNC: 110 MMOL/L (ref 95–110)
CO2 SERPL-SCNC: 23 MMOL/L (ref 23–29)
CREAT SERPL-MCNC: 0.8 MG/DL (ref 0.5–1.4)
DIFFERENTIAL METHOD BLD: ABNORMAL
EOSINOPHIL # BLD AUTO: 0.2 K/UL (ref 0–0.5)
EOSINOPHIL NFR BLD: 5.3 % (ref 0–8)
ERYTHROCYTE [DISTWIDTH] IN BLOOD BY AUTOMATED COUNT: 13.9 % (ref 11.5–14.5)
EST. GFR  (NO RACE VARIABLE): >60 ML/MIN/1.73 M^2
GLUCOSE SERPL-MCNC: 87 MG/DL (ref 70–110)
HCT VFR BLD AUTO: 29 % (ref 37–48.5)
HGB BLD-MCNC: 9.3 G/DL (ref 12–16)
IMM GRANULOCYTES # BLD AUTO: 0.01 K/UL (ref 0–0.04)
IMM GRANULOCYTES NFR BLD AUTO: 0.2 % (ref 0–0.5)
LYMPHOCYTES # BLD AUTO: 1.6 K/UL (ref 1–4.8)
LYMPHOCYTES NFR BLD: 34.3 % (ref 18–48)
MAGNESIUM SERPL-MCNC: 2.2 MG/DL (ref 1.6–2.6)
MCH RBC QN AUTO: 30.6 PG (ref 27–31)
MCHC RBC AUTO-ENTMCNC: 32.1 G/DL (ref 32–36)
MCV RBC AUTO: 95 FL (ref 82–98)
MONOCYTES # BLD AUTO: 0.5 K/UL (ref 0.3–1)
MONOCYTES NFR BLD: 11.7 % (ref 4–15)
NEUTROPHILS # BLD AUTO: 2.2 K/UL (ref 1.8–7.7)
NEUTROPHILS NFR BLD: 48.5 % (ref 38–73)
NRBC BLD-RTO: 0 /100 WBC
PHOSPHATE SERPL-MCNC: 3.4 MG/DL (ref 2.7–4.5)
PLATELET # BLD AUTO: 239 K/UL (ref 150–450)
PMV BLD AUTO: 11.4 FL (ref 9.2–12.9)
POTASSIUM SERPL-SCNC: 4.3 MMOL/L (ref 3.5–5.1)
PROT SERPL-MCNC: 5.4 G/DL (ref 6–8.4)
RBC # BLD AUTO: 3.04 M/UL (ref 4–5.4)
SODIUM SERPL-SCNC: 139 MMOL/L (ref 136–145)
WBC # BLD AUTO: 4.52 K/UL (ref 3.9–12.7)

## 2025-01-06 PROCEDURE — 97116 GAIT TRAINING THERAPY: CPT

## 2025-01-06 PROCEDURE — 85025 COMPLETE CBC W/AUTO DIFF WBC: CPT | Performed by: HOSPITALIST

## 2025-01-06 PROCEDURE — 97530 THERAPEUTIC ACTIVITIES: CPT

## 2025-01-06 PROCEDURE — 25000003 PHARM REV CODE 250: Performed by: HOSPITALIST

## 2025-01-06 PROCEDURE — 83735 ASSAY OF MAGNESIUM: CPT | Performed by: HOSPITALIST

## 2025-01-06 PROCEDURE — 97110 THERAPEUTIC EXERCISES: CPT

## 2025-01-06 PROCEDURE — 25000003 PHARM REV CODE 250: Performed by: NURSE PRACTITIONER

## 2025-01-06 PROCEDURE — 63600175 PHARM REV CODE 636 W HCPCS: Performed by: NURSE PRACTITIONER

## 2025-01-06 PROCEDURE — 84100 ASSAY OF PHOSPHORUS: CPT | Performed by: HOSPITALIST

## 2025-01-06 PROCEDURE — 11000004 HC SNF PRIVATE

## 2025-01-06 PROCEDURE — 36415 COLL VENOUS BLD VENIPUNCTURE: CPT | Performed by: HOSPITALIST

## 2025-01-06 PROCEDURE — 80053 COMPREHEN METABOLIC PANEL: CPT | Performed by: HOSPITALIST

## 2025-01-06 RX ORDER — ACETAMINOPHEN 500 MG
500 TABLET ORAL EVERY 8 HOURS
Status: DISCONTINUED | OUTPATIENT
Start: 2025-01-06 | End: 2025-01-09

## 2025-01-06 RX ORDER — ACETAMINOPHEN 500 MG
1000 TABLET ORAL EVERY 8 HOURS
Status: DISCONTINUED | OUTPATIENT
Start: 2025-01-06 | End: 2025-01-06

## 2025-01-06 RX ORDER — LOSARTAN POTASSIUM 50 MG/1
50 TABLET ORAL DAILY
Status: DISCONTINUED | OUTPATIENT
Start: 2025-01-07 | End: 2025-01-07

## 2025-01-06 RX ADMIN — LOSARTAN POTASSIUM 100 MG: 50 TABLET, FILM COATED ORAL at 08:01

## 2025-01-06 RX ADMIN — ATORVASTATIN CALCIUM 40 MG: 40 TABLET, FILM COATED ORAL at 08:01

## 2025-01-06 RX ADMIN — ENOXAPARIN SODIUM 40 MG: 40 INJECTION SUBCUTANEOUS at 04:01

## 2025-01-06 RX ADMIN — ACETAMINOPHEN 1000 MG: 500 TABLET ORAL at 08:01

## 2025-01-06 RX ADMIN — ACETAMINOPHEN 500 MG: 500 TABLET ORAL at 09:01

## 2025-01-06 RX ADMIN — SENNOSIDES AND DOCUSATE SODIUM 1 TABLET: 50; 8.6 TABLET ORAL at 08:01

## 2025-01-06 RX ADMIN — FERROUS SULFATE TAB 325 MG (65 MG ELEMENTAL FE) 1 EACH: 325 (65 FE) TAB at 08:01

## 2025-01-06 NOTE — PLAN OF CARE
Continue regular diet, assist with meals, Recommend Ascorbic acid, RD followig  Goals: PO to meet 85% of EEN/EPN by next RD follow up  Nutrition Goal Status: new  Communication of RD Recs: other (comment) (POC)     Assessment and Plan  Self feeding difficulty related to weakness as evidenced by patient with debility and requiring assistance with all ADL's.     New        Plan  General healthful diet  Feeding assistance  Collaboration of nutrition professional with other providers  Vitamin supplement therapy-Vit D, recommend Vit C  Mineral supplement therapy- Fe

## 2025-01-06 NOTE — PROGRESS NOTES
[facility]                                  Skilled Nursing Facility                   Progress Note     Admit Date: 1/3/2025  PAWAN 1/21/2025  [id number] A  Principal Problem:  Fracture of ramus of left pubis   HPI obtained from patient interview and chart review     Chief Complaint: Re-evaluation of medical treatment and therapy status: Lab review    HPI:   Taiwo Boyd is a 89 y.o.female with a PMHx of Anemia, CKD stage 3, HTN, HLD, and Secondary hyperparathyroidism who presents to SNF following hospital admission for mildly displaced left superior and inferior pubic rami fractures being treated with conservative management.  Admission to SNF for secondary weakness and debility.    Interval history: All of today's labs reviewed and are listed below.  LFTs acutely elevated to 70/86, likely due to max dose acetaminophen, will reduce and continue to trend.  BUN improved to 44.  24 hr vital sign ranges reviewed and listed below.  BP's low to normal.  Heart rates low to normal.  Patient denies shortness of breath, abdominal discomfort, nausea, or vomiting.  Patient reports an adequate appetite.  Patient denies dysuria.  Patient reports having regular bowel movements.  Patient progessing with PT/OT- 19ft CGW with RW. pt with step to GT pattern, short step length and decrease jacky. Continuing to follow and treat all acute and chronic conditions.      Past Medical History: Patient has a past medical history of Anemia in stage 3 chronic kidney disease (4/19/2018), CKD (chronic kidney disease) stage 3, GFR 30-59 ml/min, Disorder of kidney and ureter, Hypertension, Hyponatremia, and Secondary hyperparathyroidism.    Past Surgical History: Patient has a past surgical history that includes Hysterectomy; Tonsillectomy; and Appendectomy.    Social History: Patient reports that she has never smoked. She has never used smokeless tobacco. She reports  "that she does not drink alcohol and does not use drugs.    Family History: Family history is unknown by patient.    Allergies: Patient is allergic to codeine phosphate.    ROS  Constitutional: Negative for fever   Eyes: Negative for blurred vision, double vision   Respiratory: Negative for cough, shortness of breath   Cardiovascular: Negative for chest pain, palpitations, and leg swelling.   Gastrointestinal: Negative for abdominal pain, constipation, diarrhea, nausea, vomiting.   Genitourinary: Negative for dysuria, frequency   Musculoskeletal:  + generalized weakness.  +mild intermittent hip pain  Skin: Negative for itching and rash.   Neurological: Negative for dizziness, headaches.   Psychiatric/Behavioral: Negative for depression. The patient is not nervous/anxious.      24 hour Vital Sign Range   Temp:  [98.1 °F (36.7 °C)-98.2 °F (36.8 °C)]   Pulse:  [60-69]   Resp:  [14-16]   BP: (102-122)/(54-60)   SpO2:  [96 %-98 %]     Current BMI: Body mass index is 26.17 kg/m².    PEx  Constitutional: Patient appears debilitated.  No distress noted  Cardiovascular: Normal rate, regular rhythm and normal heart sounds.    Pulmonary/Chest: Effort normal and breath sounds are clear  Abdominal: Soft. Bowel sounds are normal.   Musculoskeletal: Normal range of motion.   Neurological: Alert and oriented to person, place, and time.   Psychiatric: Normal mood and affect. Behavior is normal.   Skin: Skin is warm and dry.     Recent Labs   Lab 01/06/25  0453      K 4.3      CO2 23   BUN 44*   CREATININE 0.8   CALCIUM 8.7   MG 2.2     Recent Labs   Lab 01/06/25  0453   WBC 4.52   RBC 3.04*   HGB 9.3*   HCT 29.0*      MCV 95   MCH 30.6   MCHC 32.1     No results for input(s): "POCTGLUCOSE" in the last 168 hours.     Assessment and Plan:    L superior pubic rami fracture  Left inferior pubic rami fracture   s/p ground level fall   - CT scan (12/28) Mildly displaced fractures of the left superior and inferior pubic " "rami. Suspected nondisplaced S5 anterior cortex fracture. Small amount of hemorrhage in the left anterior pelvis.  - Ortho consulted - "No operative intervention necessary"  - Continue PT/OT, WBAT  - continue acetaminophen 1000 mg QID, initiated tramadol 50 or 100 mg q.6 hours PRN  - follow-up with orthopedics as outpatient, scheduled for 1/14 with x-rays     Hypertension  - Home regimen with  Carvedilol 3.125 mg BID, HCTZ 12.5 mg, Irbesartan 300 mg daily  - was bradycardic in acute setting, carvedilol and HCTZ were discontinued but then ordered on discharge orders  - discontinue Coreg and HCTZ, continue losartan mg daily in place of non formulary irbesartan  - 1/6 BP's soft, reduce losartan to 50 mg daily and add holding parameter    CKD stage 3  - follows as outpatient with Nephrology, Dr. Celestin,  last seen on 06/10/2024  - sCr baseline is normal, BUN mildly elevated chronically  - continue monitor twice weekly BMPs, avoid nephrotoxic agents, renally dose medications as appropriate      Normocytic anemia   - Ferritin- 383, iron- 55, B12- 220, folate 8.6  - transfuse for hemoglobin < 7  - stable, continue monitor twice weekly CBC    Transaminitis  - reduce acetaminophen to 500 mg q.8 hours  - hold atorvastatin  - continue monitor twice weekly CMPs     Hyperlipidemia  - hold atorvastatin 40 mg qHS    Secondary hyperparathyroidism of renal origin   - follows as outpatient with Nephrology    Debility   - Continue with PT/OT for gait training and strengthening and restoration of ADL's   - Encourage mobility, OOB in chair, and early ambulation as appropriate  - Fall precautions   - Monitor for bowel and bladder dysfunction  - Monitor for and prevent skin breakdown and pressure ulcers  - continue DVT prophylaxis with Lovenox 40 mg daily      Anticipate disposition:  Home with home health    IP [facility] RISK OF UNPLANNED READMISSION Model: MODERATE     Follow-up needed during SNF stay-orthopedics (1/14)- will need to go to " clinic    Follow-up needed after discharge from SNF: PCP     Future Appointments   Date Time Provider Department Center   1/14/2025 10:45 AM [facility] XR-A LIMIT 350 LBS [facility] XRAY OP [facility] [facility]   1/14/2025 11:30 AM Manny Rodriguez MD [facility] [id number] [place] [facility]   3/10/2025 11:00 AM Poonam Alexis MD [facility]   4/14/2025 11:00 AM Fernando Lamas MD [facility]     I certify that SNF services are required to be given on an inpatient basis because Taiwo Boyd needs for skilled nursing care and/or skilled rehabilitation are required on a daily basis and such services can only practically be provided in a skilled nursing facility setting and are for an ongoing condition for which she received inpatient care in the hospital.       I spent 46 minutes reviewing patient records, examining, and counseling the patient/ patient's family with greater than 50% of the time spent in direct patient care and coordination.  Care coordination with nursing.  Daughter updated at bedside      Lorri Bowie NP  Department of Hospital Medicine   [facility]     DOS: 1/6/2025       Patient note was created using MModal Dictation.  Any errors in syntax or even information may not have been identified and edited on initial review prior to signing this note.

## 2025-01-06 NOTE — CONSULTS
"  [facility] - Skilled Nursing  Adult Nutrition  Consult Note    SUMMARY   Recommendations  Continue regular diet, assist with meals, Recommend Ascorbic acid, RD followig  Goals: PO to meet 85% of EEN/EPN by next RD follow up  Nutrition Goal Status: new  Communication of RD Recs: other (comment) (POC)    Assessment and Plan  Self feeding difficulty related to weakness as evidenced by patient with debility and requiring assistance with all ADL's.    New      Plan  General healthful diet  Feeding assistance  Collaboration of nutrition professional with other providers  Vitamin supplement therapy-Vit D, recommend Vit C  Mineral supplement therapy- Fe        Malnutrition Assessment pending assessment completed remotely                                       Reason for Assessment    Reason For Assessment: consult  Diagnosis:  (Fracture of ramus of left pubis)  General Information Comments: Lives alone, debility, needs assisrance with meals, Good PO, weightl loss of 5% in 8 months. Medical Hx: CKD 3, HTN, HLD, anemia, hyperparathyroidism, Children live close to her.    Nutrition Discharge Planning: DC on regular diet, ONS of choice    Nutrition/Diet History    Patient Reported Diet/Restrictions/Preferences: general  Spiritual, Cultural Beliefs, Scientology Practices, Values that Affect Care: no  Food Allergies: NKFA  Factors Affecting Nutritional Intake: None identified at this time  Food Insecurity: Patient Unable To Answer (12/30/2024)    Hunger Vital Sign     Worried About Running Out of Food in the Last Year: Patient unable to answer     Ran Out of Food in the Last Year: Patient unable to answer      Anthropometrics    Temp: 98.1 °F (36.7 °C)  Height Method: Stated  Height: 5' 2" (157.5 cm)  Height (inches): 62 in  Weight Method: Standard Scale  Weight: 64.9 kg (143 lb 1.3 oz)  Weight (lb): 143.08 lb  Ideal Body Weight (IBW), Female: 110 lb  % Ideal Body Weight, Female (lb): 130.07 %  BMI (Calculated): 26.2  BMI Grade: " 25 - 29.9 - overweight  Usual Body Weight (UBW), k.6 kg  Weight Change Amount:  (weight loss over eight months)  % Usual Body Weight: 94.8  % Weight Change From Usual Weight: -5.39 %       Lab/Procedures/Meds    Pertinent Labs Reviewed: reviewed  Pertinent Labs Comments: Hg 9.3, Hct 29.0, BUN 44, elevated LFT's, HgA!C  5.4,  Pertinent Medications Reviewed: reviewed  Pertinent Medications Comments: Vit D, Lovenox, senna-docusate, FE    Estimated/Assessed Needs    Weight Used For Calorie Calculations: 64.9 kg (143 lb 1.3 oz)  Energy Calorie Requirements (kcal): 1348  Energy Need Method: McDowell-St Jeor (x 1.3(PAL))  Protein Requirements: 65-53  Weight Used For Protein Calculations: 64.9 kg (143 lb 1.3 oz) (x 1.0-.8gm/kg)  Fluid Requirements (mL): 1348 or per MD  Estimated Fluid Requirement Method: RDA Method  RDA Method (mL): 1348  CHO Requirement: -      Nutrition Prescription Ordered    Current Diet Order: Regular  Nutrition Order Comments: %    Evaluation of Received Nutrient/Fluid Intake    I/O: -1390  Energy Calories Required: meeting needs  Protein Required: meeting needs  Fluid Required: meeting needs  Comments: lbm 1/5  Tolerance: tolerating  % Intake of Estimated Energy Needs: 75 - 100 %  % Meal Intake: 75 - 100 %    Nutrition Risk    Level of Risk/Frequency of Follow-up: low (ONE TIME PER WEEK)       Monitor and Evaluation    Food and Nutrient Intake: food and beverage intake  Food and Nutrient Adminstration: diet order  Physical Activity and Function: nutrition-related ADLs and IADLs  Anthropometric Measurements: weight change  Biochemical Data, Medical Tests and Procedures: gastrointestinal profile, electrolyte and renal panel  Nutrition-Focused Physical Findings: overall appearance, skin       Nutrition Follow-Up    RD Follow-up?: Yes

## 2025-01-06 NOTE — PLAN OF CARE
Problem: Adult Inpatient Plan of Care  Goal: Plan of Care Review  Outcome: Progressing     Problem: Adult Inpatient Plan of Care  Goal: Patient-Specific Goal (Individualized)  Outcome: Progressing     Problem: Adult Inpatient Plan of Care  Goal: Absence of Hospital-Acquired Illness or Injury  Outcome: Progressing     Problem: Fall Injury Risk  Goal: Absence of Fall and Fall-Related Injury  Outcome: Progressing

## 2025-01-06 NOTE — PT/OT/SLP PROGRESS
Occupational Therapy   Treatment    Name: Taiwo Boyd  MRN: [id number]  Admit Date: 1/3/2025  Admitting Diagnosis:  Fracture of ramus of left pubis    General Precautions: Standard, fall   Orthopedic Precautions: LLE weight bearing as tolerated   Braces: N/A    Recommendations:     Discharge Recommendations:  home health OT  Level of Assistance Recommended at Discharge: 24 hours light assistance for ADL's and homemaking tasks  Discharge Equipment Recommendations: 3-in-1 commode, bath bench, hip kit  Barriers to discharge:  Decreased caregiver support    Assessment:     Taiwo Boyd is a 89 y.o. female with a medical diagnosis of Fracture of ramus of left pubis. Pt tolerated session well and without incident and shows excellent motivation and potential to improve, but the pt continues to require assistance to perform self-care tasks and mobility. Pt strengths include Functional cognition, Following multi-step tasks, and Attention to task and PLOF, Family support, Motivation, and Willingness to participate. Pt improved Footwear. However, pt would continue to benefit from cont'd OT services in the SNF setting to improve safety and independence /c functional tasks and ADLs upon discharge. Performance deficits affecting function are weakness, impaired endurance, impaired self care skills, impaired functional mobility, gait instability, impaired balance, decreased coordination, decreased lower extremity function, decreased safety awareness, pain, impaired coordination, impaired cardiopulmonary response to activity.     Rehab Potential is good    Activity tolerance:  Good    Plan:     Patient to be seen 5 x/week to address the above listed problems via self-care/home management, therapeutic activities, therapeutic exercises, neuromuscular re-education    Plan of Care Expires: 01/21/25  Plan of Care Reviewed with: patient    Subjective     Communicated with: JOHN prior to session.     Pain/Comfort:  Pain Rating 1:  0/10  Pain Rating Post-Intervention 1: 0/10    Patient's cultural, spiritual, Islam conflicts given the current situation:  no    Objective:     Patient found up in chair with Other (comments) (no active lines) upon OT entry to room. Pt alert and agreeable to therapy.         Functional Mobility/Transfers:  Patient completed Sit <> Stand Transfer with contact guard assistance  with  rolling walker   Patient completed Chair Transfer using Step Transfer technique with contact guard assistance with rolling walker      Activities of Daily Living:  Footwear: Mod I to doff/don socks    [facility] 6 Click ADL: 20    OT Exercises:   ~Pt performed 1x12 reps of the following exercises with 3 lb dowel while seated using BUE.     Chest presses, Shoulder presses, Biceps curls, Shoulder flexion, and Shoulder extension    ~Pt performed 1x12 reps of the following exercises with 2 lb dumbbells while seated using BUE.     Shoulder adduction and Shoulder abduction    Pt req short rest break between sets.     ~Pt performed 2 trials task to improve hip flexion and trunk mobility. Pt asked to retrieve objects placed on floor while seated in WC. 2nd trial graded for increased difficulty by increasing distance from objects. Pt able to complete /c no errors.     ~Pt participated in standing tolerance, functional reach, and integration of BUE functional task. Pt asked to match cards to appropriate target on counter while standing. Pt able to complete task /c SBA and RW in place for safety.     ~Pt participated in 12 minute UBE activity seated in WC at moderate resistance to address endurance and strength of UE to improve performance of ADLs and other functional tasks.       Treatment & Education:  Pt educated on POC, role of OT, and safety. Pt performed tasks to improve safety and independence in functional tasks and ADLs as mentioned above.       Patient left up in chair with  tech present and all needs met, returning to room from rehab  gym    GOALS:   Multidisciplinary Problems       Occupational Therapy Goals          Problem: Occupational Therapy    Goal Priority Disciplines Outcome Interventions   Occupational Therapy Goal     OT, PT/OT Progressing    Description: Goals to be met by: 2/3/2025     Patient will increase functional independence with ADLs by performing:    UE Dressing with Set up A.  LE Dressing with Set-up Assistance.  Grooming while seated at sink with Modified [place].  Toileting from toilet with Mod I for hygiene and clothing management.   Bathing from  shower chair/bench with Supervision.  Toilet transfer to toilet with Modified [place].  Pt will tolerate standing for 10 minutes while completing dynamic task with SPV using LRAD.   FWM with mod I using AE as needed- Met                          Time Tracking:     OT Date of Treatment: 01/06/25  OT Start Time: 1130    OT Stop Time: 1208  OT Total Time (min): 38 min    Billable Minutes:Therapeutic Activity 15  Therapeutic Exercise 23    1/6/2025

## 2025-01-06 NOTE — PT/OT/SLP PROGRESS
Physical Therapy Treatment    Patient Name:  Taiwo Boyd   MRN:  [id number]  Admit Date: 1/3/2025  Admitting Diagnosis: Fracture of ramus of left pubis  Recent Surgeries: N/A    General Precautions: Standard, fall  Orthopedic Precautions: LLE weight bearing as tolerated, RLE weight bearing as tolerated  Braces: N/A    Recommendations:     Discharge Recommendations: home health PT  Level of Assistance Recommended at Discharge: 24 hours light assistance  Discharge Equipment Recommendations: wheelchair, walker, rolling, bath bench  Barriers to discharge: Decreased caregiver support    Assessment:     Taiwo Boyd is a 89 y.o. female admitted with a medical diagnosis of Fracture of ramus of left pubis .Performance deficits affecting function: weakness, impaired endurance, impaired self care skills, impaired functional mobility, gait instability, impaired balance, decreased upper extremity function, decreased lower extremity function, decreased ROM, impaired cardiopulmonary response to activity, orthopedic precautions. Pt will continue to benefit from skilled PT services during this hospital admit to continue to improve transfer ability and efficiency as well as continue to progress pt's ambulation distance and cardiopulmonary endurance to maximize pt's functional independence and return to PLOF.         Rehab Potential is good    Activity Tolerance: Good    Plan:     Patient to be seen 5 x/week to address the above listed problems via gait training, therapeutic activities, therapeutic exercises, neuromuscular re-education, wheelchair management/training    Plan of Care Expires: 02/03/25  Plan of Care Reviewed with: patient    Subjective     Pt agreeable to work with PT.     Pain/Comfort:  Pain Rating 1: 0/10  Pain Rating Post-Intervention 1: 0/10    Patient's cultural, spiritual, Alevism conflicts given the current situation:  no    Objective:     Communicated with pt prior to session.  Patient found up in chair  with   upon PT entry to room.     Therapeutic Activities and Exercises: LBEx 15mins to improve ROM, MMT and endurance.    Functional Mobility:  Transfers:     Sit to Stand:  contact guard assistance with rolling walker  Gait: 52ft +53ft+77ft with RW and CGA/SBA for safety. Pt with short step to GT pattern and decrease jacky. Pt requested seated rest breaks between GT trials d.t BU/E fatigue which she attributed to her leaning on the walker for balance.    AM-PAC 6 CLICK MOBILITY  17    Patient left up in chair with call button in reach.    GOALS:   Multidisciplinary Problems       Physical Therapy Goals          Problem: Physical Therapy    Goal Priority Disciplines Outcome Interventions   Physical Therapy Goal     PT, PT/OT Progressing    Description: Goals to be met by: 2/4/25     Patient will increase functional independence with mobility by performing:    . Supine to sit with [place]  . Sit to supine with [place]  . Rolling to Left and Right with [place].  . Sit to stand transfer with [place]  . Bed to chair transfer with Modified [place] using LRAD  . Gait  x 150 feet with Supervision using LRAD  . Wheelchair propulsion x150 feet with Supervision using bilateral uppper extremities  . Ascend/descend 4 stair with bilateral Handrails Stand-by Assistance using No Assistive Device.   . Ascend/Descend 4 inch curb step with Stand-by Assistance using Rolling Walker.                         Time Tracking:     PT Received On: 01/06/25  PT Start Time: 1024  PT Stop Time: 1102  PT Total Time (min): 38 min    Billable Minutes: Gait Training 23 and Therapeutic Exercise 15    Treatment Type: Treatment  PT/PTA: PT     Number of PTA visits since last PT visit: 0     01/06/2025

## 2025-01-06 NOTE — PLAN OF CARE
Problem: Occupational Therapy  Goal: Occupational Therapy Goal  Description: Goals to be met by: 2/3/2025     Patient will increase functional independence with ADLs by performing:    UE Dressing with Set up A.  LE Dressing with Set-up Assistance.  Grooming while seated at sink with Modified [place].  Toileting from toilet with Mod I for hygiene and clothing management.   Bathing from  shower chair/bench with Supervision.  Toilet transfer to toilet with Modified [place].  Pt will tolerate standing for 10 minutes while completing dynamic task with SPV using LRAD.   FWM with mod I using AE as needed- Met     Outcome: Progressing

## 2025-01-07 PROCEDURE — 25000003 PHARM REV CODE 250: Performed by: NURSE PRACTITIONER

## 2025-01-07 PROCEDURE — 97110 THERAPEUTIC EXERCISES: CPT

## 2025-01-07 PROCEDURE — 97116 GAIT TRAINING THERAPY: CPT

## 2025-01-07 PROCEDURE — 11000004 HC SNF PRIVATE

## 2025-01-07 PROCEDURE — 25000003 PHARM REV CODE 250: Performed by: HOSPITALIST

## 2025-01-07 PROCEDURE — 63600175 PHARM REV CODE 636 W HCPCS: Performed by: NURSE PRACTITIONER

## 2025-01-07 PROCEDURE — 97530 THERAPEUTIC ACTIVITIES: CPT

## 2025-01-07 RX ADMIN — FERROUS SULFATE TAB 325 MG (65 MG ELEMENTAL FE) 1 EACH: 325 (65 FE) TAB at 09:01

## 2025-01-07 RX ADMIN — ENOXAPARIN SODIUM 40 MG: 40 INJECTION SUBCUTANEOUS at 04:01

## 2025-01-07 RX ADMIN — ACETAMINOPHEN 500 MG: 500 TABLET ORAL at 05:01

## 2025-01-07 RX ADMIN — LOSARTAN POTASSIUM 50 MG: 50 TABLET, FILM COATED ORAL at 09:01

## 2025-01-07 RX ADMIN — ACETAMINOPHEN 500 MG: 500 TABLET ORAL at 11:01

## 2025-01-07 RX ADMIN — SENNOSIDES AND DOCUSATE SODIUM 1 TABLET: 50; 8.6 TABLET ORAL at 09:01

## 2025-01-07 NOTE — PT/OT/SLP PROGRESS
Physical Therapy Treatment    Patient Name:  Taiwo Boyd   MRN:  [id number]  Admit Date: 1/3/2025  Admitting Diagnosis: Fracture of ramus of left pubis  Recent Surgeries: N/A    General Precautions: Standard, fall  Orthopedic Precautions: LLE weight bearing as tolerated, RLE weight bearing as tolerated  Braces: N/A    Recommendations:     Discharge Recommendations: home health PT  Level of Assistance Recommended at Discharge: 24 hours light assistance  Discharge Equipment Recommendations: wheelchair, walker, rolling, bath bench  Barriers to discharge: Decreased caregiver support    Assessment:     Taiwo Boyd is a 89 y.o. female admitted with a medical diagnosis of Fracture of ramus of left pubis . Performance deficits affecting function: weakness, impaired endurance, impaired self care skills, impaired functional mobility, gait instability, impaired balance, decreased upper extremity function, decreased lower extremity function, decreased ROM, impaired cardiopulmonary response to activity, orthopedic precautions. Pt will continue to benefit from skilled PT services during this hospital admit to continue to improve transfer ability and efficiency as well as continue to progress pt's ambulation distance and cardiopulmonary endurance to maximize pt's functional independence and return to PLOF.       Rehab Potential is good    Activity Tolerance: Good    Plan:     Patient to be seen 5 x/week to address the above listed problems via gait training, therapeutic activities, therapeutic exercises, neuromuscular re-education, wheelchair management/training    Plan of Care Expires: 02/03/25  Plan of Care Reviewed with: patient    Subjective     Pt agreeable to work with PT.     Pain/Comfort:  Pain Rating 1: 0/10  Pain Rating Post-Intervention 1: 0/10    Patient's cultural, spiritual, Baptist conflicts given the current situation:  no    Objective:     Communicated with pt prior to session.  Patient found up in chair with  "  upon PT entry to room.     Therapeutic Activities and Exercises: LBEx 10mins to improve MMT and endurance    Functional Mobility:  Bed Mobility (on gym mat with no rails):     Supine to Sit: supervision  Sit to Supine: supervision  Transfers:     Sit to Stand:  stand by assistance with rolling walker  Bed to Chair: stand by assistance with  no AD  using  Stand Pivot  Gait: ~100ft x 2 trials with RW and SBA/(S) for safety. Pt with decrease step length and jacky.  Stairs:  Pt ascended/descended 4 stair(s) and 4" curb step with Rolling Walker with bilateral handrails with Stand-by Assistance and Contact Guard Assistance.     AM-PAC 6 CLICK MOBILITY  18    Patient left up in chair with call button in reach.    GOALS:   Multidisciplinary Problems       Physical Therapy Goals          Problem: Physical Therapy    Goal Priority Disciplines Outcome Interventions   Physical Therapy Goal     PT, PT/OT Progressing    Description: Goals to be met by: 2/4/25     Patient will increase functional independence with mobility by performing:    . Supine to sit with [place]  . Sit to supine with [place]  . Rolling to Left and Right with [place].  . Sit to stand transfer with [place]  . Bed to chair transfer with Modified [place] using LRAD  . Gait  x 150 feet with Supervision using LRAD  . Wheelchair propulsion x150 feet with Supervision using bilateral uppper extremities  . Ascend/descend 4 stair with bilateral Handrails Stand-by Assistance using No Assistive Device.   . Ascend/Descend 4 inch curb step with Stand-by Assistance using Rolling Walker.                         Time Tracking:     PT Received On: 01/07/25  PT Start Time: 0940  PT Stop Time: 1022  PT Total Time (min): 42 min    Billable Minutes: Gait Training 25 and Therapeutic Exercise 17    Treatment Type: Treatment  PT/PTA: PT     Number of PTA visits since last PT visit: 0     01/07/2025  "

## 2025-01-07 NOTE — PLAN OF CARE
Interdisciplinary team, Marilyn Remy, RN Charge Nurse, Kylee Denney LPN, , Elvira Hobbs, PT, Rehab, Ariana Song, Activities, and Melly Chahal, Dietician, spoke to patient for care plan conference, weekly status update, and therapy progress update. Tentative discharge date set for 1/21/25. Declined for IDT to call family.    Preferred Home Health: [facility]  Preferred Pharmacy: [facility]  Discharge Disposition: Home alone

## 2025-01-07 NOTE — PLAN OF CARE
Problem: Occupational Therapy  Goal: Occupational Therapy Goal  Description: Goals to be met by: 2/3/2025     Patient will increase functional independence with ADLs by performing:    UE Dressing with Set up A.  LE Dressing with Set-up Assistance.  Grooming while seated at sink with Modified [place].  Toileting from toilet with Mod I for hygiene and clothing management- Ongoing  Bathing from  shower chair/bench with Supervision.  Toilet transfer to toilet with Modified [place]- Ongoing  Pt will tolerate standing for 10 minutes while completing dynamic task with SPV using LRAD- Ongoing  FWM with mod I using AE as needed- Met     Outcome: Progressing

## 2025-01-07 NOTE — PT/OT/SLP PROGRESS
Occupational Therapy   Treatment    Name: Taiwo Boyd  MRN: [id number]  Admit Date: 1/3/2025  Admitting Diagnosis:  Fracture of ramus of left pubis    General Precautions: Standard, fall   Orthopedic Precautions: LLE weight bearing as tolerated   Braces: N/A    Recommendations:     Discharge Recommendations:  home health OT  Level of Assistance Recommended at Discharge: 24 hours light assistance for ADL's and homemaking tasks  Discharge Equipment Recommendations: 3-in-1 commode, bath bench, hip kit  Barriers to discharge:  Decreased caregiver support    Assessment:     Taiwo Boyd is a 89 y.o. female with a medical diagnosis of Fracture of ramus of left pubis. Pt tolerated session well and without incident and shows excellent motivation and potential to improve, but the pt continues to require assistance to perform self-care tasks and mobility. Pt strengths include Functional cognition, Following multi-step tasks, and Attention to task and PLOF, Family support, Motivation, and Willingness to participate. Pt improved Toileting and Toilet transfers. However, pt would continue to benefit from cont'd OT services in the SNF setting to improve safety and independence /c functional tasks and ADLs upon discharge.  Performance deficits affecting function are weakness, impaired endurance, impaired self care skills, impaired functional mobility, gait instability, impaired balance, decreased coordination, decreased lower extremity function, decreased safety awareness, pain, impaired coordination, impaired cardiopulmonary response to activity.     Rehab Potential is good    Activity tolerance:  Good    Plan:     Patient to be seen 5 x/week to address the above listed problems via self-care/home management, therapeutic activities, therapeutic exercises, neuromuscular re-education    Plan of Care Expires: 01/21/25  Plan of Care Reviewed with: patient    Subjective     Communicated with: JOHN prior to session.  "    Pain/Comfort:  Pain Rating 1: 3/10  Location - Side 1: Right  Location - Orientation 1: generalized  Location 1: leg  Pain Addressed 1: Distraction  Pain Rating Post-Intervention 1: 3/10    Patient's cultural, spiritual, Faith conflicts given the current situation:  no    Objective:     Patient found  on toilet  with  (no active lines) upon OT entry to room. Pt alert and agreeable to therapy.       Functional Mobility/Transfers:  Patient completed Sit <> Stand Transfer with stand by assistance  with  rolling walker   Patient completed Chair Transfer using Step Transfer technique with stand by assistance with hand-held assist.   Patient completed Toilet Transfer from raised toilet seat Step Transfer technique with stand by assistance with  rolling walker. Vcs for completion of pivot /c RW prior to sitting  Functional Mobility: Pt ambulated for functional tasks /c SBA/CGA and RW    Activities of Daily Living:  Toileting: stand by assistance for seated perirectal hygiene and standing /c RW for clothing mgmt    [facility] 6 Click ADL: 20    OT Exercises:     -Pt performed IADL task to practice kitchen safety and food retrieval while using RW. Pt educated on proper use of RW for food retrieval, proper use of RW in kitchen environment, safe storage, and proper technique for safety placing food in cabinets. Pt asked to retrieve and replace food from overhead cabinets and waist level drawers. Pt able to complete task /c SBA/CGA and RW.     -Pt participated in standing "clothes folding" activity to improve standing tolerance, bimanual integration without UE support for standing, and preformance of IADLs. Pt asked to fold and sort various items of clothing while standing. Pt able to complete /c SBA and no AD.     -Pt participated in 12 minute UBE activity seated in WC at moderate resistance to address endurance and strength of UE to improve performance of ADLs and other functional tasks.       Treatment & Education:  Pt " educated on POC, role of OT, and safety. Pt performed tasks to improve safety and independence in functional tasks and ADLs as mentioned above.       Patient left up in chair with  tech present and all needs met    GOALS:   Multidisciplinary Problems       Occupational Therapy Goals          Problem: Occupational Therapy    Goal Priority Disciplines Outcome Interventions   Occupational Therapy Goal     OT, PT/OT Progressing    Description: Goals to be met by: 2/3/2025     Patient will increase functional independence with ADLs by performing:    UE Dressing with Set up A.  LE Dressing with Set-up Assistance.  Grooming while seated at sink with Modified [place].  Toileting from toilet with Mod I for hygiene and clothing management- Ongoing  Bathing from  shower chair/bench with Supervision.  Toilet transfer to toilet with Modified [place]- Ongoing  Pt will tolerate standing for 10 minutes while completing dynamic task with SPV using LRAD- Ongoing  FWM with mod I using AE as needed- Met                          Time Tracking:     OT Date of Treatment: 01/07/25  OT Start Time: 0857    OT Stop Time: 0937  OT Total Time (min): 40 min    Billable Minutes:Therapeutic Activity 25  Therapeutic Exercise 15    1/7/2025

## 2025-01-07 NOTE — CLINICAL REVIEW
Clinical Pharmacy Chart Review Note      Admit Date: 1/3/2025   LOS: 4 days       Taiwo Boyd is a 89 y.o. female admitted to SNF for PT/OT after hospitalization for fracture of ramus of left pubis.    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     Review of patient's allergies indicates:   Allergen Reactions    Codeine phosphate Rash and Other (See Comments)     Patient Active Problem List    Diagnosis Date Noted    Primary hypertension 01/03/2025    Fracture of ramus of left pubis 12/29/2024    Essential hypertension 04/05/2022    Secondary hyperparathyroidism of renal origin 04/05/2022    High cholesterol 05/27/2021    Vitamin D deficiency 03/03/2020       Scheduled Meds:    acetaminophen  500 mg Oral Q8H    enoxparin  40 mg Subcutaneous Daily    [START ON 2/3/2025] ergocalciferol  50,000 Units Oral Q30 Days    ferrous sulfate  1 tablet Oral Daily    losartan  50 mg Oral Daily    senna-docusate 8.6-50 mg  1 tablet Oral BID     Continuous Infusions:   PRN Meds:   Current Facility-Administered Medications:     acetaminophen, 650 mg, Oral, Q6H PRN    calcium carbonate, 500 mg, Oral, BID PRN    melatonin, 6 mg, Oral, Nightly PRN    traMADoL, 100 mg, Oral, Q6H PRN    traMADoL, 50 mg, Oral, Q6H PRN    OBJECTIVE:     Vital Signs (Last 24H)  Temp:  [97.3 °F (36.3 °C)-97.5 °F (36.4 °C)]   Pulse:  [68-71]   Resp:  [16-17]   BP: ()/(55-63)   SpO2:  [95 %-96 %]     Laboratory:  CBC:   Recent Labs   Lab 01/02/25  0211 01/03/25  0219 01/06/25  0453   WBC 4.82 5.11 4.52   RBC 3.26* 3.07* 3.04*   HGB 9.7* 9.4* 9.3*   HCT 29.8* 28.2* 29.0*    210 239   MCV 91 92 95   MCH 29.8 30.6 30.6   MCHC 32.6 33.3  32.1     BMP:   Recent Labs   Lab 01/02/25  0211 01/03/25  0219 01/06/25  0453   GLU 88 87 87    140 139   K 5.1 4.5 4.3    107 110   CO2 22* 23 23   BUN 51* 56* 44*   CREATININE 0.9 1.2 0.8   CALCIUM 9.2 9.0 8.7   MG 2.2 2.1 2.2     CMP:   Recent Labs   Lab 01/02/25  0211 01/03/25  0219 01/06/25  0453   GLU 88 87 87   CALCIUM 9.2 9.0 8.7   ALBUMIN 2.8* 2.9* 2.8*   PROT 5.5* 5.5* 5.4*    140 139   K 5.1 4.5 4.3   CO2 22* 23 23    107 110   BUN 51* 56* 44*   CREATININE 0.9 1.2 0.8   ALKPHOS 58 58 66   ALT 23 27 86*   AST 24 24 70*   BILITOT 0.4 0.5 0.4     LFTs:   Recent Labs   Lab 01/02/25  0211 01/03/25 0219 01/06/25  0453   ALT 23 27 86*   AST 24 24 70*   ALKPHOS 58 58 66   BILITOT 0.4 0.5 0.4   PROT 5.5* 5.5* 5.4*   ALBUMIN 2.8* 2.9* 2.8*         ASSESSMENT/PLAN:     I have reviewed the medications in compliance with CMS Regulation F756 of the [place]. No irregularities were noted at this time.    **According to PMH and home medication list, patient takes the following medications at home. These medications are not currently ordered at [facility]:  Atorvastatin 40 mg daily (on hold for elevated LFTs)  Carvedilol 3.125 mg twice daily (on hold for hypotension/bradycardia)  HCTZ 12.5 mg daily (on hold for hypotension)      Medications reviewed by PharmD, please re-consult if needed.      Ines Aceves, Pharm. D.  Clinical Pharmacist  [facility] term

## 2025-01-07 NOTE — PROGRESS NOTES
[facility]                                  Skilled Nursing Facility                   Progress Note     Admit Date: 1/3/2025  PAWAN 1/21/2025  [id number]  Principal Problem:  Fracture of ramus of left pubis   HPI obtained from patient interview and chart review     Chief Complaint: Re-evaluation of medical treatment and therapy status, hypotension    HPI:   Taiwo Boyd is a 89 y.o.female with a PMHx of Anemia, CKD stage 3, HTN, HLD, and Secondary hyperparathyroidism who presents to SNF following hospital admission for mildly displaced left superior and inferior pubic rami fractures being treated with conservative management.  Admission to SNF for secondary weakness and debility.    Interval history: 24 hr vital sign ranges reviewed and listed below.  BP's remain low to normal.  Losartan was given this morning despite holding parameter, will hold medication at this time.  Heart rates low to normal.  Patient denies shortness of breath, abdominal discomfort, nausea, or vomiting.  Patient reports an adequate appetite.  Patient denies dysuria.  Patient reports having regular bowel movements.  Patient progessing with PT/OT- patient ambulated 52ft +53ft+77ft with RW and CGA/SBA for safety. Pt with short step to GT pattern and decrease jacky. Pt requested seated rest breaks between GT trials d.t BU/E fatigue which she attributed to her leaning on the walker for balance. Continuing to follow and treat all acute and chronic conditions.      Past Medical History: Patient has a past medical history of Anemia in stage 3 chronic kidney disease (4/19/2018), CKD (chronic kidney disease) stage 3, GFR 30-59 ml/min, Disorder of kidney and ureter, Hypertension, Hyponatremia, and Secondary hyperparathyroidism.    Past Surgical History: Patient has a past surgical history that includes Hysterectomy; Tonsillectomy; and Appendectomy.    Social History:  "Patient reports that she has never smoked. She has never used smokeless tobacco. She reports that she does not drink alcohol and does not use drugs.    Family History: Family history is unknown by patient.    Allergies: Patient is allergic to codeine phosphate.    ROS  Constitutional: Negative for fever   Eyes: Negative for blurred vision, double vision   Respiratory: Negative for cough, shortness of breath   Cardiovascular: Negative for chest pain, palpitations, and leg swelling.   Gastrointestinal: Negative for abdominal pain, constipation, diarrhea, nausea, vomiting.   Genitourinary: Negative for dysuria, frequency   Musculoskeletal:  + generalized weakness.  +mild intermittent hip pain  Skin: Negative for itching and rash.   Neurological: Negative for dizziness, headaches.   Psychiatric/Behavioral: Negative for depression. The patient is not nervous/anxious.      24 hour Vital Sign Range   Temp:  [97.3 °F (36.3 °C)-97.5 °F (36.4 °C)]   Pulse:  [68-71]   Resp:  [16-17]   BP: ()/(55-63)   SpO2:  [95 %-96 %]     Current BMI: Body mass index is 26.17 kg/m².    PEx  Constitutional: Patient appears debilitated.  No distress noted  Cardiovascular: Normal rate, regular rhythm and normal heart sounds.    Pulmonary/Chest: Effort normal and breath sounds are clear  Abdominal: Soft. Bowel sounds are normal.   Musculoskeletal: Normal range of motion.   Neurological: Alert and oriented to person, place, and time.   Psychiatric: Normal mood and affect. Behavior is normal.   Skin: Skin is warm and dry.     No results for input(s): "GLUCOSE", "NA", "K", "CL", "CO2", "BUN", "CREATININE", "CALCIUM", "MG" in the last 24 hours.    No results for input(s): "WBC", "RBC", "HGB", "HCT", "PLT", "MCV", "MCH", "MCHC" in the last 24 hours.    No results for input(s): "POCTGLUCOSE" in the last 168 hours.     Assessment and Plan:    L superior pubic rami fracture  Left inferior pubic rami fracture   s/p ground level fall   - CT scan " "(12/28) Mildly displaced fractures of the left superior and inferior pubic rami. Suspected nondisplaced S5 anterior cortex fracture. Small amount of hemorrhage in the left anterior pelvis.  - Ortho consulted - "No operative intervention necessary"  - Continue PT/OT, WBAT  - continue acetaminophen 1000 mg QID, tramadol 50 or 100 mg q.6 hours PRN  - follow-up with orthopedics as outpatient, scheduled for 1/14 with x-rays     Hypertension  - Home regimen with  Carvedilol 3.125 mg BID, HCTZ 12.5 mg, Irbesartan 300 mg daily  - was bradycardic in acute setting, carvedilol and HCTZ were discontinued but then ordered on discharge orders  - discontinue Coreg and HCTZ, continue losartan mg daily in place of non formulary irbesartan  - 1/7 BP remains soft, holding losartan    CKD stage 3  - follows as outpatient with Nephrology, Dr. Celestin,  last seen on 06/10/2024  - sCr baseline is normal, BUN mildly elevated chronically  - stable, continue monitor twice weekly BMPs, avoid nephrotoxic agents, renally dose medications as appropriate      Normocytic anemia   - Ferritin- 383, iron- 55, B12- 220, folate 8.6  - transfuse for hemoglobin < 7  - stable, continue monitor twice weekly CBC    Transaminitis  - reduce acetaminophen to 500 mg q.8 hours  - hold atorvastatin  - continue monitor twice weekly CMPs     Hyperlipidemia  - hold atorvastatin 40 mg qHS    Secondary hyperparathyroidism of renal origin   - follows as outpatient with Nephrology    Debility   - Continue with PT/OT for gait training and strengthening and restoration of ADL's   - Encourage mobility, OOB in chair, and early ambulation as appropriate  - Fall precautions   - Monitor for bowel and bladder dysfunction  - Monitor for and prevent skin breakdown and pressure ulcers  - continue DVT prophylaxis with Lovenox 40 mg daily      Anticipate disposition:  Home with home health    IP [facility] RISK OF UNPLANNED READMISSION Model: MODERATE     Follow-up needed during SNF " stay-orthopedics (1/14)- will need to go to clinic    Follow-up needed after discharge from SNF: PCP     Future Appointments   Date Time Provider Department Center   1/14/2025 10:45 AM [facility] XR-A LIMIT 350 LBS [facility] XRAY OP [facility]   1/14/2025 11:30 AM Manny Rodriguez MD [facility] [id number] [facility]   3/10/2025 11:00 AM Poonam Alexis MD [facility]   4/14/2025 11:00 AM Fernando Lamas MD [facility]     I certify that SNF services are required to be given on an inpatient basis because Taiwo Boyd needs for skilled nursing care and/or skilled rehabilitation are required on a daily basis and such services can only practically be provided in a skilled nursing facility setting and are for an ongoing condition for which she received inpatient care in the hospital.     I spent  45 minutes reviewing patient records, examining, and counseling the patient/ patient's family with greater than 50% of the time spent in direct patient care and coordination.  Care coordination with nursing      Lorri Bowie NP  Department of Hospital Medicine   [facility]- Skilled Nursing Facility     DOS: 1/7/2025       Patient note was created using MModal Dictation.  Any errors in syntax or even information may not have been identified and edited on initial review prior to signing this note.

## 2025-01-08 PROCEDURE — 11000004 HC SNF PRIVATE

## 2025-01-08 PROCEDURE — 97116 GAIT TRAINING THERAPY: CPT

## 2025-01-08 PROCEDURE — 99306 1ST NF CARE HIGH MDM 50: CPT | Mod: ,,, | Performed by: HOSPITALIST

## 2025-01-08 PROCEDURE — 25000003 PHARM REV CODE 250: Performed by: HOSPITALIST

## 2025-01-08 PROCEDURE — 97110 THERAPEUTIC EXERCISES: CPT

## 2025-01-08 PROCEDURE — 63600175 PHARM REV CODE 636 W HCPCS: Performed by: NURSE PRACTITIONER

## 2025-01-08 PROCEDURE — 25000003 PHARM REV CODE 250: Performed by: NURSE PRACTITIONER

## 2025-01-08 PROCEDURE — 97530 THERAPEUTIC ACTIVITIES: CPT

## 2025-01-08 RX ADMIN — SENNOSIDES AND DOCUSATE SODIUM 1 TABLET: 50; 8.6 TABLET ORAL at 08:01

## 2025-01-08 RX ADMIN — ACETAMINOPHEN 500 MG: 500 TABLET ORAL at 06:01

## 2025-01-08 RX ADMIN — ENOXAPARIN SODIUM 40 MG: 40 INJECTION SUBCUTANEOUS at 05:01

## 2025-01-08 RX ADMIN — ACETAMINOPHEN 500 MG: 500 TABLET ORAL at 02:01

## 2025-01-08 RX ADMIN — TRAMADOL HYDROCHLORIDE 100 MG: 50 TABLET, COATED ORAL at 08:01

## 2025-01-08 RX ADMIN — FERROUS SULFATE TAB 325 MG (65 MG ELEMENTAL FE) 1 EACH: 325 (65 FE) TAB at 08:01

## 2025-01-08 RX ADMIN — ACETAMINOPHEN 500 MG: 500 TABLET ORAL at 09:01

## 2025-01-08 NOTE — H&P
"Hospital Medicine  Skilled Nursing Facility   History and Physical Exam      Date of Service: 01/08/2025      Patient Name: Taiwo Boyd  MRN: [id number]  Admission Date: 1/3/2025  Attending Physician: Gustabo Olivarez MD  Primary Care Provider: Fernando Lamas MD  Code Status: Full Code      Principal problem:  Fracture of ramus of left pubis      Chief Complaint:   Chief Complaint   Patient presents with    Fall     Admitted to [facility] for rehabilitation following hospital stay for a fall resulting in left superior and inferior pubic rami fractures.           HPI:   "Tiawo Boyd is a 89 y.o.female with a PMHx of Anemia, CKD stage 3, HTN, HLD, and Secondary hyperparathyroidism who presents to SNF following hospital admission for mildly displaced left superior and inferior pubic rami fractures being treated with conservative management.  Admission to SNF for secondary weakness and debility.     Patient originally presented to [facility] ED on 12/28/2024 after suffering a fall on her left hip.  Per hospital notes,  In the ED, CT of left hip demonstrated mildly displaced fractures of the left superior and inferior pubic rami. Orthopedics was consulted and recommended no operative intervention at this time. PT/OT were consulted and recommended SNF placement. Throughout admission, pt was asymptomatically bradycardiac, with HRs in the 50s, and the decision was made to discontinue coreg and HCTZ. On the day of discharge, patient was afebrile with stable vital signs and will be discharged in stable condition to SNF with close follow up."     Patient was admitted with Coreg and hydrochlorothiazide despite hospital notes stating to discontinue.  Will discontinue these medications as this was likely done in error.  Patient states that her pain is well controlled at this time.  She is doing well with therapy at this time." Per Lorri Bowie NP on 1/4/25.     She is doing well today. Says that her pain is " controlled with the current medication. She does have increased pain when she stands and walks but it improves as she gets moving and with the medication. She has a good appetite and denies any nausea or abdominal pain. She is having regular BMs. She does report some sinus congestion and a runny nose which is chronic for her.     Patient admitted with skilled services with PT and OT to improve functional status and ability to perform ADLs.       Past Medical History:   Past Medical History:   Diagnosis Date    Anemia in stage 3 chronic kidney disease 4/19/2018    CKD (chronic kidney disease) stage 3, GFR 30-59 ml/min     Disorder of kidney and ureter     Hypertension     Hyponatremia     Secondary hyperparathyroidism        Past Surgical History:   Past Surgical History:   Procedure Laterality Date    APPENDECTOMY      HYSTERECTOMY      TONSILLECTOMY         Social History:   Tobacco Use    Smoking status: Never    Smokeless tobacco: Never   Substance and Sexual Activity    Alcohol use: No    Drug use: No    Sexual activity: Yes     Comment:  lives with spouse       Family History:   Family History    Family history is unknown by patient.         No current facility-administered medications on file prior to encounter.     Current Outpatient Medications on File Prior to Encounter   Medication Sig    acetaminophen (TYLENOL) 500 MG tablet Take 1 tablet (500 mg total) by mouth every 6 (six) hours.    atorvastatin (LIPITOR) 40 MG tablet Take 1 tablet by mouth once daily    ergocalciferol (ERGOCALCIFEROL) 50,000 unit Cap Take 1 capsule (50,000 Units total) by mouth every 30 days.    ferrous sulfate 325 mg (65 mg iron) Tab tablet Take 325 mg by mouth once daily.    irbesartan (AVAPRO) 300 MG tablet Take 1 tablet by mouth once daily       Allergies:   Review of patient's allergies indicates:   Allergen Reactions    Codeine phosphate Rash and Other (See Comments)       ROS:  Review of Systems   Constitutional:   Negative for appetite change and fever.   HENT:  Positive for congestion and rhinorrhea.    Respiratory:  Negative for cough and shortness of breath.    Cardiovascular:  Negative for chest pain and palpitations.   Gastrointestinal:  Negative for abdominal pain, nausea and vomiting.   Genitourinary:  Negative for difficulty urinating and dysuria.   Musculoskeletal:  Positive for gait problem. Negative for arthralgias and back pain.   Neurological:  Positive for weakness. Negative for dizziness and light-headedness.   Psychiatric/Behavioral:  Negative for sleep disturbance. The patient is not nervous/anxious.      Objective:  Temp:  [98 °F (36.7 °C)-98.3 °F (36.8 °C)]   Pulse:  [55-70]   Resp:  [16-18]   BP: (121-142)/(59-65)   SpO2:  [96 %-99 %]     Body mass index is 26.17 kg/m².    Physical Exam  Vitals and nursing note reviewed.   Constitutional:       General: She is not in acute distress.     Appearance: She is well-developed.   Cardiovascular:      Rate and Rhythm: Normal rate and regular rhythm.      Heart sounds: S1 normal and S2 normal. No murmur heard.  Pulmonary:      Effort: Pulmonary effort is normal. No respiratory distress.      Breath sounds: Normal breath sounds. No wheezing or rales.   Abdominal:      General: Bowel sounds are normal. There is no distension.      Palpations: Abdomen is soft.      Tenderness: There is no abdominal tenderness.   Musculoskeletal:         General: No tenderness.      Right lower leg: No edema.      Left lower leg: No edema.   Skin:     General: Skin is warm and dry.      Findings: Bruising present.   Neurological:      Mental Status: She is alert and oriented to person, place, and time.   Psychiatric:         Mood and Affect: Mood normal.         Behavior: Behavior normal. Behavior is cooperative.         Thought Content: Thought content normal.         Significant Labs:   CBC:  Recent Labs   Lab 01/06/25  0453   WBC 4.52   HGB 9.3*   HCT 29.0*      MCV 95  "    BMP:  Recent Labs   Lab 01/06/25  0453   GLU 87      K 4.3      CO2 23   BUN 44*   CREATININE 0.8   CALCIUM 8.7   MG 2.2   PHOS 3.4     LFTs:  Recent Labs   Lab 01/06/25  0453   ALT 86*   AST 70*   ALKPHOS 66   BILITOT 0.4   PROT 5.4*   ALBUMIN 2.8*       Significant Imaging: I have reviewed all pertinent imaging results/findings completed during prior hospitalization.      Assessment and Plan:  Active Diagnoses:    Diagnosis Date Noted POA    PRINCIPAL PROBLEM:  Fracture of ramus of left pubis [S32.592A] 12/29/2024 Yes    Primary hypertension [I10] 01/03/2025 Yes    Essential hypertension [I10] 04/05/2022 Yes     Chronic    Secondary hyperparathyroidism of renal origin [N25.81] 04/05/2022 Yes    High cholesterol [E78.00] 05/27/2021 Yes     Chronic    Vitamin D deficiency [E55.9] 03/03/2020 Yes    Anemia due to stage 3a chronic kidney disease [N18.31, D63.1] 04/19/2018 Yes      Problems Resolved During this Admission:       L superior pubic rami fracture  Left inferior pubic rami fracture   s/p ground level fall   - CT scan (12/28) Mildly displaced fractures of the left superior and inferior pubic rami. Suspected nondisplaced S5 anterior cortex fracture. Small amount of hemorrhage in the left anterior pelvis.  - Ortho consulted - "No operative intervention necessary"  - PT/OT  - WBAT  - continue acetaminophen 500 mg q8 hrs and tramadol 50 or 100 mg q.6 hours PRN  - Follow-up with Orthopedics as outpatient which is scheduled for 1/14 with x-rays     Primary Hypertension  - Home regimen with  Carvedilol 3.125 mg BID, HCTZ 12.5 mg, Irbesartan 300 mg daily  - Had bradycardia in acute setting, carvedilol and HCTZ were discontinued   - Holding losartan for now with low normal BP.      CKD stage 3  - follows as outpatient with Nephrology, Dr. Alexis, last seen on 6/10/2024  - sCr baseline is normal, BUN mildly elevated chronically  - Continue monitor twice weekly BMPs  - avoid nephrotoxic agents  - " renally dose medications as appropriate      Normocytic anemia   - Ferritin- 383, iron- 55, B12- 220, folate 8.6  - transfuse for hemoglobin < 7  - Continue monitor twice weekly CBC     Elevated liver enzymes  - Acetaminophen dose reduced from 1000 mg q6 hrs to 500 mg q.8 hours  - Holding atorvastatin  - continue monitor twice weekly CMPs     Hyperlipidemia  - holding atorvastatin 40 mg qHS     Secondary hyperparathyroidism of renal origin   - follows as outpatient with Nephrology     Debility   - Continue with PT/OT for gait training and strengthening and restoration of ADL's   - Encourage mobility, OOB in chair, and early ambulation as appropriate  - Fall precautions   - Monitor for bowel and bladder dysfunction  - Monitor for and prevent skin breakdown and pressure ulcers  - continue DVT prophylaxis with enoxaparin 40 mg daily       IP [facility] RISK OF UNPLANNED READMISSION Model: Moderate      Anticipated Disposition:  Home with home health      Future Appointments   Date Time Provider Department Center   1/14/2025 10:45 AM [facility]DC XR-A LIMIT 350 LBS [facility] XRAY OP [facility]   1/14/2025 11:30 AM Manny Rodriguez MD [facility] [id number] [facility]   3/10/2025 11:00 AM Poonam Alexis MD [facility]   4/14/2025 11:00 AM Fernando Lamas MD [facility]       I certify that SNF services are required to be given on an inpatient basis because Taiwo Boyd needs for skilled nursing care and/or skilled rehabilitation are required on a daily basis and such services can only practically be provided in a skilled nursing facility setting and are for an ongoing condition for which she received inpatient care in the hospital.       Gustabo Olivarez MD  Department of Hospital Medicine   [facility] - Skilled Nursing

## 2025-01-08 NOTE — PLAN OF CARE
Problem: Adult Inpatient Plan of Care  Goal: Plan of Care Review  Outcome: Progressing  Flowsheets (Taken 1/8/2025 0149)  Plan of Care Reviewed With: patient  Goal: Absence of Hospital-Acquired Illness or Injury  Outcome: Progressing  Intervention: Identify and Manage Fall Risk  Flowsheets (Taken 1/8/2025 0149)  Safety Promotion/Fall Prevention:   assistive device/personal item within reach   instructed to call staff for mobility   room near unit station  Goal: Optimal Comfort and Wellbeing  Outcome: Progressing  Intervention: Monitor Pain and Promote Comfort  Flowsheets (Taken 1/8/2025 0149)  Pain Management Interventions: pain management plan reviewed with patient/caregiver     Problem: Fall Injury Risk  Goal: Absence of Fall and Fall-Related Injury  Outcome: Progressing  Intervention: Identify and Manage Contributors  Flowsheets (Taken 1/8/2025 0149)  Medication Review/Management: medications reviewed     Problem: Skin Injury Risk Increased  Goal: Skin Health and Integrity  Outcome: Progressing

## 2025-01-08 NOTE — PLAN OF CARE
Problem: Physical Therapy  Goal: Physical Therapy Goal  Description: Goals to be met by: 2/4/25     Patient will increase functional independence with mobility by performing:    . Supine to sit with Republic  . Sit to supine with Republic  . Rolling to Left and Right with Republic.  . Sit to stand transfer with Republic  . Bed to chair transfer with Modified Republic using LRAD  . Gait  x 150 feet with Supervision using LRAD  . Wheelchair propulsion x150 feet with Supervision using bilateral uppper extremities  . Ascend/descend 4 stair with bilateral Handrails Stand-by Assistance using No Assistive Device.   . Ascend/Descend 4 inch curb step with Stand-by Assistance using Rolling Walker.    Outcome: Progressing

## 2025-01-08 NOTE — PT/OT/SLP PROGRESS
Physical Therapy Treatment    Patient Name:  Taiwo Boyd   MRN:  [id number]  Admit Date: 1/3/2025  Admitting Diagnosis: Fracture of ramus of left pubis  Recent Surgeries: N/A    General Precautions: Standard, fall  Orthopedic Precautions: LLE weight bearing as tolerated, RLE weight bearing as tolerated  Braces: N/A    Recommendations:     Discharge Recommendations: home health PT  Level of Assistance Recommended at Discharge: 24 hours light assistance  Discharge Equipment Recommendations: wheelchair, walker, rolling, bath bench  Barriers to discharge: Decreased caregiver support    Assessment:     Taiwo Boyd is a 89 y.o. female admitted with a medical diagnosis of Fracture of ramus of left pubis . Pt with improved GT distance needing (S) and CGA to complete 4 inch curb step and 4 steps with B rails. Pt remains motivated and still plans to return home with limited assistance during the day. Pt will therefore benefit from continued SNF rehab.    Performance deficits affecting function: weakness, impaired endurance, impaired self care skills, impaired functional mobility, gait instability, impaired balance, decreased upper extremity function, decreased lower extremity function, decreased ROM, impaired cardiopulmonary response to activity, orthopedic precautions.    Rehab Potential is good    Activity Tolerance: Good    Plan:     Patient to be seen 5 x/week to address the above listed problems via gait training, therapeutic activities, therapeutic exercises, neuromuscular re-education, wheelchair management/training    Plan of Care Expires: 02/03/25  Plan of Care Reviewed with: patient    Subjective     Pt agreeable to work with PT.     Pain/Comfort:  Pain Rating 1: 0/10  Pain Rating Post-Intervention 1: 0/10    Patient's cultural, spiritual, Hoahaoism conflicts given the current situation:  no    Objective:     Communicated with pt prior to session.  Patient found up in chair with   upon PT entry to room.  "    Therapeutic Activities and Exercises: B L/E seated therex x 30reps: AP, LAQ, Hip flex, GS    Functional Mobility:  Transfers:     Sit to Stand:  supervision with rolling walker  Gait: 137ft+37ft+74ft with RW and (S) for safety. Pt still with step to GT pattern and needing seated rest breaks between GT trials d.t B U/E fatigue from pushing down on walker  Stairs:  Pt ascended/descended 4 stair(s) and 4" curb step with Rolling Walker with bilateral handrails with Contact Guard Assistance.     AM-PAC 6 CLICK MOBILITY  18    Patient left up in chair with call button in reach.    GOALS:   Multidisciplinary Problems       Physical Therapy Goals          Problem: Physical Therapy    Goal Priority Disciplines Outcome Interventions   Physical Therapy Goal     PT, PT/OT Progressing    Description: Goals to be met by: 2/4/25     Patient will increase functional independence with mobility by performing:    . Supine to sit with Lamar  . Sit to supine with Lamar  . Rolling to Left and Right with Lamar.  . Sit to stand transfer with Lamar  . Bed to chair transfer with Modified Lamar using LRAD  . Gait  x 150 feet with Supervision using LRAD  . Wheelchair propulsion x150 feet with Supervision using bilateral uppper extremities  . Ascend/descend 4 stair with bilateral Handrails Stand-by Assistance using No Assistive Device.   . Ascend/Descend 4 inch curb step with Stand-by Assistance using Rolling Walker.                         Time Tracking:     PT Received On: 01/08/25  PT Start Time: 0828  PT Stop Time: 0912  PT Total Time (min): 44 min    Billable Minutes: Gait Training 30 and Therapeutic Exercise 14    Treatment Type: Treatment  PT/PTA: PT     Number of PTA visits since last PT visit: 0     01/08/2025  "

## 2025-01-08 NOTE — NURSING
PCT notified nurse of redness all over pt back, upon assessment pt had redness to his mid to lower back and buttocks denies itching, burning or pain at this time. NP notified. Monitoring will continue.

## 2025-01-08 NOTE — NURSING
Pt Safety maintained throughout shift, bed locked and in lowest position, call light in reach. Pt remained free of fall/trauma. VSS, afebrile this shift.

## 2025-01-08 NOTE — PT/OT/SLP PROGRESS
Occupational Therapy   Treatment    Name: Taiwo Boyd  MRN: [id number]  Admit Date: 1/3/2025  Admitting Diagnosis:  Fracture of ramus of left pubis    General Precautions: Standard, fall   Orthopedic Precautions: LLE weight bearing as tolerated   Braces: N/A    Recommendations:     Discharge Recommendations:  home health OT  Level of Assistance Recommended at Discharge: 24 hours supervision for safety in performing ADL's and home management tasks  Discharge Equipment Recommendations: 3-in-1 commode, bath bench, hip kit  Barriers to discharge:  Decreased caregiver support    Assessment:     Taiwo Boyd is a 89 y.o. female with a medical diagnosis of Fracture of ramus of left pubis. Pt tolerated session well and without incident and shows excellent motivation and potential to improve, but the pt continues to require assistance to perform self-care tasks and mobility. Pt strengths include Functional cognition, Following multi-step tasks, and Attention to task and PLOF, Family support, Motivation, and Willingness to participate. However, pt would continue to benefit from cont'd OT services in the SNF setting to improve safety and independence /c functional tasks and ADLs upon discharge.Performance deficits affecting function are weakness, impaired endurance, impaired self care skills, impaired functional mobility, gait instability, impaired balance, decreased coordination, decreased lower extremity function, decreased safety awareness, pain, impaired coordination, impaired cardiopulmonary response to activity.     Rehab Potential is good    Activity tolerance:  Good    Plan:     Patient to be seen 5 x/week to address the above listed problems via self-care/home management, therapeutic activities, therapeutic exercises, neuromuscular re-education    Plan of Care Expires: 01/21/25  Plan of Care Reviewed with: patient    Subjective     Communicated with: JOHN prior to session.     Pain/Comfort:  Pain Rating 1: 0/10  Pain  Rating Post-Intervention 1: 0/10    Patient's cultural, spiritual, Taoist conflicts given the current situation:  no    Objective:     Patient found up in chair with  (no active line) upon OT entry to room. Pt alert and agreeable to therapy.     Functional Mobility/Transfers:  Patient completed Sit <> Stand Transfer with stand by assistance  with  rolling walker   Functional Mobility: Pt propelled WC ~65 +~65 feet /c SBA.       AMPAC 6 Click ADL: 20    OT Exercises:     ~Pt participated in task to improve standing balance, alternating standing hip flexion, and visual scanning, gross motor coordination. Pt asked to alternate stepping laterally, cross-body, and anteriorly onto flat and elevated surfaces in pattern designated by OT. Pt unable to flex L hip for stepping onto elevated surfaces. Otherwise, pt able to complete task /c CGA and RW.     ~Pt performed 3x10 reps of the following exercises with 3 lb dowel while standing using BUE.     Chest presses, Shoulder presses, Biceps curls, and Shoulder flexion    Pt req seated rest break between sets/circuits      ~Pt participated in 10 minute UBE activity seated in WC at maximal resistance to address endurance and strength of UE to improve performance of ADLs and other functional tasks.       Treatment & Education:  Pt educated on POC, role of OT, and safety. Pt performed tasks to improve safety and independence in functional tasks and ADLs as mentioned above.       Patient left up in chair with call button in reach and all needs met    GOALS:   Multidisciplinary Problems       Occupational Therapy Goals          Problem: Occupational Therapy    Goal Priority Disciplines Outcome Interventions   Occupational Therapy Goal     OT, PT/OT Progressing    Description: Goals to be met by: 2/3/2025     Patient will increase functional independence with ADLs by performing:    UE Dressing with Set up A.  LE Dressing with Set-up Assistance.  Grooming while seated at sink with  Modified [place].  Toileting from toilet with Mod I for hygiene and clothing management- Ongoing  Bathing from  shower chair/bench with Supervision.  Toilet transfer to toilet with Modified [place]- Ongoing  Pt will tolerate standing for 10 minutes while completing dynamic task with SPV using LRAD- Ongoing  FWM with mod I using AE as needed- Met                          Time Tracking:     OT Date of Treatment: 01/08/25  OT Start Time: 0937    OT Stop Time: 1020  OT Total Time (min): 43 min    Billable Minutes:Therapeutic Activity 15  Therapeutic Exercise 28    1/8/2025

## 2025-01-09 LAB
ALBUMIN SERPL BCP-MCNC: 3.1 G/DL (ref 3.5–5.2)
ALP SERPL-CCNC: 110 U/L (ref 40–150)
ALT SERPL W/O P-5'-P-CCNC: 105 U/L (ref 10–44)
ANION GAP SERPL CALC-SCNC: 8 MMOL/L (ref 8–16)
AST SERPL-CCNC: 69 U/L (ref 10–40)
BASOPHILS # BLD AUTO: 0.04 K/UL (ref 0–0.2)
BASOPHILS NFR BLD: 0.8 % (ref 0–1.9)
BILIRUB SERPL-MCNC: 0.4 MG/DL (ref 0.1–1)
BUN SERPL-MCNC: 49 MG/DL (ref 8–23)
CALCIUM SERPL-MCNC: 9.1 MG/DL (ref 8.7–10.5)
CHLORIDE SERPL-SCNC: 107 MMOL/L (ref 95–110)
CO2 SERPL-SCNC: 23 MMOL/L (ref 23–29)
CREAT SERPL-MCNC: 0.8 MG/DL (ref 0.5–1.4)
DIFFERENTIAL METHOD BLD: ABNORMAL
EOSINOPHIL # BLD AUTO: 0.3 K/UL (ref 0–0.5)
EOSINOPHIL NFR BLD: 5 % (ref 0–8)
ERYTHROCYTE [DISTWIDTH] IN BLOOD BY AUTOMATED COUNT: 14.3 % (ref 11.5–14.5)
EST. GFR  (NO RACE VARIABLE): >60 ML/MIN/1.73 M^2
GLUCOSE SERPL-MCNC: 83 MG/DL (ref 70–110)
HCT VFR BLD AUTO: 28.1 % (ref 37–48.5)
HGB BLD-MCNC: 9.2 G/DL (ref 12–16)
IMM GRANULOCYTES # BLD AUTO: 0.01 K/UL (ref 0–0.04)
IMM GRANULOCYTES NFR BLD AUTO: 0.2 % (ref 0–0.5)
LYMPHOCYTES # BLD AUTO: 2.1 K/UL (ref 1–4.8)
LYMPHOCYTES NFR BLD: 39.6 % (ref 18–48)
MAGNESIUM SERPL-MCNC: 2.2 MG/DL (ref 1.6–2.6)
MCH RBC QN AUTO: 31.4 PG (ref 27–31)
MCHC RBC AUTO-ENTMCNC: 32.7 G/DL (ref 32–36)
MCV RBC AUTO: 96 FL (ref 82–98)
MONOCYTES # BLD AUTO: 0.6 K/UL (ref 0.3–1)
MONOCYTES NFR BLD: 11 % (ref 4–15)
NEUTROPHILS # BLD AUTO: 2.3 K/UL (ref 1.8–7.7)
NEUTROPHILS NFR BLD: 43.4 % (ref 38–73)
NRBC BLD-RTO: 0 /100 WBC
PHOSPHATE SERPL-MCNC: 3.8 MG/DL (ref 2.7–4.5)
PLATELET # BLD AUTO: 257 K/UL (ref 150–450)
PMV BLD AUTO: 11.1 FL (ref 9.2–12.9)
POTASSIUM SERPL-SCNC: 4.9 MMOL/L (ref 3.5–5.1)
PROT SERPL-MCNC: 6 G/DL (ref 6–8.4)
RBC # BLD AUTO: 2.93 M/UL (ref 4–5.4)
SODIUM SERPL-SCNC: 138 MMOL/L (ref 136–145)
WBC # BLD AUTO: 5.25 K/UL (ref 3.9–12.7)

## 2025-01-09 PROCEDURE — 25000003 PHARM REV CODE 250: Performed by: NURSE PRACTITIONER

## 2025-01-09 PROCEDURE — 97530 THERAPEUTIC ACTIVITIES: CPT

## 2025-01-09 PROCEDURE — 97116 GAIT TRAINING THERAPY: CPT

## 2025-01-09 PROCEDURE — 97535 SELF CARE MNGMENT TRAINING: CPT

## 2025-01-09 PROCEDURE — 97110 THERAPEUTIC EXERCISES: CPT

## 2025-01-09 PROCEDURE — 36415 COLL VENOUS BLD VENIPUNCTURE: CPT | Performed by: HOSPITALIST

## 2025-01-09 PROCEDURE — 85025 COMPLETE CBC W/AUTO DIFF WBC: CPT | Performed by: HOSPITALIST

## 2025-01-09 PROCEDURE — 84100 ASSAY OF PHOSPHORUS: CPT | Performed by: HOSPITALIST

## 2025-01-09 PROCEDURE — 80053 COMPREHEN METABOLIC PANEL: CPT | Performed by: HOSPITALIST

## 2025-01-09 PROCEDURE — 11000004 HC SNF PRIVATE

## 2025-01-09 PROCEDURE — 83735 ASSAY OF MAGNESIUM: CPT | Performed by: HOSPITALIST

## 2025-01-09 PROCEDURE — 63600175 PHARM REV CODE 636 W HCPCS: Performed by: NURSE PRACTITIONER

## 2025-01-09 PROCEDURE — 25000003 PHARM REV CODE 250: Performed by: HOSPITALIST

## 2025-01-09 RX ORDER — ACETAMINOPHEN 500 MG
500 TABLET ORAL EVERY 8 HOURS PRN
Status: DISCONTINUED | OUTPATIENT
Start: 2025-01-09 | End: 2025-01-17 | Stop reason: HOSPADM

## 2025-01-09 RX ORDER — TRAMADOL HYDROCHLORIDE 50 MG/1
100 TABLET ORAL DAILY
Status: DISCONTINUED | OUTPATIENT
Start: 2025-01-10 | End: 2025-01-17 | Stop reason: HOSPADM

## 2025-01-09 RX ADMIN — SENNOSIDES AND DOCUSATE SODIUM 1 TABLET: 50; 8.6 TABLET ORAL at 08:01

## 2025-01-09 RX ADMIN — ACETAMINOPHEN 500 MG: 500 TABLET ORAL at 05:01

## 2025-01-09 RX ADMIN — FERROUS SULFATE TAB 325 MG (65 MG ELEMENTAL FE) 1 EACH: 325 (65 FE) TAB at 08:01

## 2025-01-09 RX ADMIN — TRAMADOL HYDROCHLORIDE 50 MG: 50 TABLET, COATED ORAL at 08:01

## 2025-01-09 RX ADMIN — ENOXAPARIN SODIUM 40 MG: 40 INJECTION SUBCUTANEOUS at 05:01

## 2025-01-09 NOTE — PROGRESS NOTES
[facility]                                  Skilled Nursing Facility                   Progress Note     Admit Date: 1/3/2025  PAWAN 1/21/2025  [id number] A  Principal Problem:  Fracture of ramus of left pubis   HPI obtained from patient interview and chart review     Chief Complaint: Re-evaluation of medical treatment and therapy status, hypotension, lab review    HPI:   Taiwo Boyd is a 89 y.o.female with a PMHx of Anemia, CKD stage 3, HTN, HLD, and Secondary hyperparathyroidism who presents to SNF following hospital admission for mildly displaced left superior and inferior pubic rami fractures being treated with conservative management.  Admission to SNF for secondary weakness and debility.    Interval history: All of today's labs reviewed and are listed below.  LFTs remain mildly 69/105 from 70/86, will change scheduled acetaminophen to PRN.  24 hr vital sign ranges reviewed and listed below.  BP remains soft., heart rates improved.  Patient denies shortness of breath, abdominal discomfort, nausea, or vomiting.  Patient reports an adequate appetite.  Patient denies dysuria.  Patient reports having regular bowel movements.  Patient progressing with PT/OT- patient ambulated 137ft+37ft+74ft with RW and (S) for safety. Pt still with step to GT pattern and needing seated rest breaks between GT trials d.t B U/E fatigue from pushing down on walker. Continuing to follow and treat all acute and chronic conditions.    Past Medical History: Patient has a past medical history of Anemia in stage 3 chronic kidney disease (4/19/2018), CKD (chronic kidney disease) stage 3, GFR 30-59 ml/min, Disorder of kidney and ureter, Hypertension, Hyponatremia, and Secondary hyperparathyroidism.    Past Surgical History: Patient has a past surgical history that includes Hysterectomy; Tonsillectomy; and Appendectomy.    Social History: Patient reports that she  "has never smoked. She has never used smokeless tobacco. She reports that she does not drink alcohol and does not use drugs.    Family History: Family history is unknown by patient.    Allergies: Patient is allergic to codeine phosphate.    ROS  Constitutional: Negative for fever   Eyes: Negative for blurred vision, double vision   Respiratory: Negative for cough, shortness of breath   Cardiovascular: Negative for chest pain, palpitations, and leg swelling.   Gastrointestinal: Negative for abdominal pain, constipation, diarrhea, nausea, vomiting.   Genitourinary: Negative for dysuria, frequency   Musculoskeletal:  + generalized weakness.  +mild intermittent hip pain  Skin: Negative for itching and rash.   Neurological: Negative for dizziness, headaches.   Psychiatric/Behavioral: Negative for depression. The patient is not nervous/anxious.      24 hour Vital Sign Range   Temp:  [97.7 °F (36.5 °C)-97.8 °F (36.6 °C)]   Pulse:  [68-73]   Resp:  [17-18]   BP: (114-117)/(56-58)   SpO2:  [96 %-97 %]     Current BMI: Body mass index is 26.17 kg/m².    PEx  Constitutional: Patient appears debilitated.  No distress noted  Cardiovascular: Normal rate, regular rhythm and normal heart sounds.    Pulmonary/Chest: Effort normal and breath sounds are clear  Abdominal: Soft. Bowel sounds are normal.   Musculoskeletal: Normal range of motion.   Neurological: Alert and oriented to person, place, and time.   Psychiatric: Normal mood and affect. Behavior is normal.   Skin: Skin is warm and dry.     Recent Labs   Lab 01/09/25  0437      K 4.9      CO2 23   BUN 49*   CREATININE 0.8   CALCIUM 9.1   MG 2.2       Recent Labs   Lab 01/09/25  0437   WBC 5.25   RBC 2.93*   HGB 9.2*   HCT 28.1*      MCV 96   MCH 31.4*   MCHC 32.7       No results for input(s): "POCTGLUCOSE" in the last 168 hours.     Assessment and Plan:    L superior pubic rami fracture  Left inferior pubic rami fracture   s/p ground level fall   - CT scan " "(12/28) Mildly displaced fractures of the left superior and inferior pubic rami. Suspected nondisplaced S5 anterior cortex fracture. Small amount of hemorrhage in the left anterior pelvis.  - Ortho consulted - "No operative intervention necessary"  - Continue PT/OT, WBAT  - initiated tramadol 100 mg daily, tramadol 50 q.6 hours PRN, acetaminophen PRN  - follow-up with orthopedics as outpatient, scheduled for 1/14 with x-rays     Hypertension  - Home regimen with  Carvedilol 3.125 mg BID, HCTZ 12.5 mg, Irbesartan 300 mg daily  - was bradycardic in acute setting, carvedilol and HCTZ were discontinued but then ordered on discharge orders  - discontinue Coreg and HCTZ, continue losartan mg daily in place of non formulary irbesartan  - 1/9 BP remains soft, holding losartan    CKD stage 3  - follows as outpatient with Nephrology, Dr. Celestin,  last seen on 06/10/2024  - sCr baseline is normal, BUN mildly elevated chronically  - stable, continue monitor twice weekly BMPs, avoid nephrotoxic agents, renally dose medications as appropriate      Normocytic anemia   - Ferritin- 383, iron- 55, B12- 220, folate 8.6  - transfuse for hemoglobin < 7  - stable, continue monitor twice weekly CBC    Transaminitis  - hold atorvastatin  - persisting, change acetaminophen to PRN, continue monitor twice weekly CMPs     Hyperlipidemia  - hold atorvastatin 40 mg qHS    Secondary hyperparathyroidism of renal origin   - follows as outpatient with Nephrology    Debility   - Continue with PT/OT for gait training and strengthening and restoration of ADL's   - Encourage mobility, OOB in chair, and early ambulation as appropriate  - Fall precautions   - Monitor for bowel and bladder dysfunction  - Monitor for and prevent skin breakdown and pressure ulcers  - continue DVT prophylaxis with Lovenox 40 mg daily      Anticipate disposition:  Home with home health    IP [facility] RISK OF UNPLANNED READMISSION Model: MODERATE     Follow-up needed during SNF " stay-orthopedics (1/14)- will need to go to clinic    Follow-up needed after discharge from SNF: PCP     Future Appointments   Date Time Provider Department Center   1/14/2025 10:45 AM [facility] XR-A LIMIT 350 LBS [facility] XRAY [facility]   1/14/2025 11:30 AM Manny Rodriguez MD [facility] [id number]   3/10/2025 11:00 AM Poonam Alexis MD [facility]   4/14/2025 11:00 AM Fernando Lamas MD [facility]     I certify that SNF services are required to be given on an inpatient basis because Taiwo Boyd needs for skilled nursing care and/or skilled rehabilitation are required on a daily basis and such services can only practically be provided in a skilled nursing facility setting and are for an ongoing condition for which she received inpatient care in the hospital.     I spent 46 minutes reviewing patient records, examining, and counseling the patient/ patient's family with greater than 50% of the time spent in direct patient care and coordination.  Patient education provided.    Lorri Bowie NP  Department of Hospital Medicine   [facility]     DOS: 1/9/2025       Patient note was created using MModal Dictation.  Any errors in syntax or even information may not have been identified and edited on initial review prior to signing this note.

## 2025-01-09 NOTE — PT/OT/SLP PROGRESS
Physical Therapy Treatment    Patient Name:  Taiwo Boyd   MRN:  [id number]  Admit Date: 1/3/2025  Admitting Diagnosis: Fracture of ramus of left pubis  Recent Surgeries: N/A    General Precautions: Standard, fall  Orthopedic Precautions: LLE weight bearing as tolerated, RLE weight bearing as tolerated  Braces: N/A    Recommendations:     Discharge Recommendations: home health PT  Level of Assistance Recommended at Discharge: 24 hours light assistance  Discharge Equipment Recommendations: wheelchair, walker, rolling, bath bench  Barriers to discharge: Decreased caregiver support    Assessment:     Taiwo Boyd is a 89 y.o. female admitted with a medical diagnosis of Fracture of ramus of left pubis . Pt continues to make good functional progress and is currently consistently (S) for GT and SBA for curb step and stair negotiation. Pt will continue to benefit from skilled PT services during this hospital admit to continue to improve transfer ability and efficiency as well as continue to progress pt's ambulation distance and cardiopulmonary endurance to maximize pt's functional independence and return to PLOF.   Performance deficits affecting function: weakness, impaired endurance, impaired self care skills, impaired functional mobility, gait instability, impaired balance, decreased upper extremity function, decreased lower extremity function, impaired cardiopulmonary response to activity, orthopedic precautions.    Rehab Potential is good    Activity Tolerance: Good    Plan:     Patient to be seen 5 x/week to address the above listed problems via gait training, therapeutic activities, therapeutic exercises, neuromuscular re-education, wheelchair management/training    Plan of Care Expires: 02/03/25  Plan of Care Reviewed with: patient    Subjective     Pt agreeable to work with PT.     Pain/Comfort:  Pain Rating 1: 0/10  Pain Rating Post-Intervention 1: 0/10    Patient's cultural, spiritual, Gnosticism conflicts given  "the current situation:  no    Objective:     Communicated with pt prior to session.  Patient found up in chair with   upon PT entry to room.     Therapeutic Activities and Exercises: Nustep with resistance set at 5 for 10mins to improve ROM, overall MMT and cardiovascular endurance.  W/C<> Nustep seat: SBA/CGA SPT with no AD    Functional Mobility:  Transfers:     Sit to Stand:  supervision with rolling walker  Bed to Chair: stand by assistance with  no AD  using  Stand Pivot  Gait: 91ft +59ft with RW and (S) for safety. Pt with step to GT pattern and decrease jacky. Pt needed seated rest breaks between GT trials d.t B U/E fatigue.  Stairs:  Pt ascended/descended 4 stair(s) and 4" curb step with Rolling Walker with bilateral handrails with Stand-by Assistance.     AM-PAC 6 CLICK MOBILITY  18    Patient left up in chair with call button in reach.    GOALS:   Multidisciplinary Problems       Physical Therapy Goals          Problem: Physical Therapy    Goal Priority Disciplines Outcome Interventions   Physical Therapy Goal     PT, PT/OT Progressing    Description: Goals to be met by: 2/4/25     Patient will increase functional independence with mobility by performing:    . Supine to sit with [place]  . Sit to supine with [place]  . Rolling to Left and Right with [place].  . Sit to stand transfer with [place]  . Bed to chair transfer with Modified [place] using LRAD  . Gait  x 150 feet with Supervision using LRAD  . Wheelchair propulsion x150 feet with Supervision using bilateral uppper extremities  . Ascend/descend 4 stair with bilateral Handrails Stand-by Assistance using No Assistive Device.   . Ascend/Descend 4 inch curb step with Stand-by Assistance using Rolling Walker.                         Time Tracking:     PT Received On: 01/09/25  PT Start Time: 0945  PT Stop Time: 1023  PT Total Time (min): 38 min    Billable Minutes: Gait Training 23 and Therapeutic Exercise 15    Treatment " Type: Treatment  PT/PTA: PT     Number of PTA visits since last PT visit: 0     01/09/2025

## 2025-01-09 NOTE — PLAN OF CARE
Problem: Occupational Therapy  Goal: Occupational Therapy Goal  Description: Goals to be met by: 2/3/2025     Patient will increase functional independence with ADLs by performing:    UE Dressing with Set up A- Met  ~UE dressing /c Mod I- Met  LE Dressing with Set-up Assistance- Ongoing  Grooming while seated at sink with Modified [place]- Met  Toileting from toilet with Mod I for hygiene and clothing management- Met  Bathing from  shower chair/bench with Supervision- Met  Toilet transfer to toilet with Modified [place]- Ongoing  Pt will tolerate standing for 10 minutes while completing dynamic task with SPV using LRAD- Ongoing  FWM with mod I using AE as needed- Met     Outcome: Progressing

## 2025-01-09 NOTE — PT/OT/SLP PROGRESS
Occupational Therapy   Treatment    Name: Taiwo Boyd  MRN: [id number]  Admit Date: 1/3/2025  Admitting Diagnosis:  Fracture of ramus of left pubis    General Precautions: Standard, fall   Orthopedic Precautions: LLE weight bearing as tolerated   Braces: N/A    Recommendations:     Discharge Recommendations:  home health OT  Level of Assistance Recommended at Discharge: 24 hours supervision for safety in performing ADL's and home management tasks  Discharge Equipment Recommendations: 3-in-1 commode, bath bench, hip kit  Barriers to discharge:  Decreased caregiver support    Assessment:     Taiwo Boyd is a 89 y.o. female with a medical diagnosis of Fracture of ramus of left pubis. Pt tolerated session well and without incident and shows excellent motivation and potential to improve, but the pt continues to require assistance to perform self-care tasks and mobility. Pt strengths include Functional cognition, Following multi-step tasks, and Attention to task and PLOF, Family support, Motivation, and Willingness to participate. Pt improved UBD, LBD, Grooming/hygiene, Bathing, SC transfers, Toileting, and Toilet transfers. However, pt would continue to benefit from cont'd OT services in the SNF setting to improve safety and independence /c functional tasks and ADLs upon discharge.   Performance deficits affecting function are weakness, impaired endurance, impaired self care skills, impaired functional mobility, gait instability, impaired balance, decreased coordination, decreased lower extremity function, decreased safety awareness, pain, impaired coordination, impaired cardiopulmonary response to activity.     Rehab Potential is good    Activity tolerance:  Good    Plan:     Patient to be seen 5 x/week to address the above listed problems via self-care/home management, therapeutic activities, therapeutic exercises, neuromuscular re-education    Plan of Care Expires: 01/21/25  Plan of Care Reviewed with:  patient    Subjective     Communicated with: NSG prior to session.     Pain/Comfort:  Pain Rating 1: 0/10  Pain Rating Post-Intervention 1: 0/10    Patient's cultural, spiritual, Presybeterian conflicts given the current situation:  no    Objective:     Patient found ambulatory in room/sharif /c PCT present with  (no active lines) upon OT entry to room. Pt alert and agreeable to therapy.       Functional Mobility/Transfers:  Patient completed Sit <> Stand Transfer with stand by assistance  with  rolling walker   Patient completed Chair  Step Transfer technique with stand by assistance with rolling walker  Patient completed Toilet Transfer to raised toilet seat Step Transfer technique with stand by assistance with  rolling walker  Patient completed  Shower Transfer Step Transfer technique with stand by assistance with rolling walker  Functional Mobility: Pt ambulated for functional tasks /c SBA and RW    Activities of Daily Living:  Grooming: supervision standing at sink /c RW to brush teeth  Bathing: supervision seated in SC/standing /c grab bar in shower   Upper Body Dressing: modified independence to doff/don shirt; RW for retrieval    Lower Body Dressing: supervision to don underwear and pants; RW for retrieval and standing balance  Toileting: modified independence /c RW for standing portion of task  Footwear: Mod I to don slippers/doff socks    [facility] 6 Click ADL: 22      Treatment & Education:  Pt educated on POC, role of OT, and safety. Pt performed tasks to improve safety and independence in functional tasks and ADLs as mentioned above.       Patient left up in chair with call button in reach and all needs met    GOALS:   Multidisciplinary Problems       Occupational Therapy Goals          Problem: Occupational Therapy    Goal Priority Disciplines Outcome Interventions   Occupational Therapy Goal     OT, PT/OT Progressing    Description: Goals to be met by: 2/3/2025     Patient will increase functional independence  with ADLs by performing:    UE Dressing with Set up A- Met  ~UE dressing /c Mod I- Met  LE Dressing with Set-up Assistance- Ongoing  Grooming while seated at sink with Modified [place]- Met  Toileting from toilet with Mod I for hygiene and clothing management- Met  Bathing from  shower chair/bench with Supervision- Met  Toilet transfer to toilet with Modified [place]- Ongoing  Pt will tolerate standing for 10 minutes while completing dynamic task with SPV using LRAD- Ongoing  FWM with mod I using AE as needed- Met                          Time Tracking:     OT Date of Treatment: 01/09/25  OT Start Time: 0835    OT Stop Time: 0913  OT Total Time (min): 38 min    Billable Minutes:Self Care/Home Management 30  Therapeutic Activity 8    1/9/2025

## 2025-01-10 PROCEDURE — 11000004 HC SNF PRIVATE

## 2025-01-10 PROCEDURE — 63600175 PHARM REV CODE 636 W HCPCS: Performed by: NURSE PRACTITIONER

## 2025-01-10 PROCEDURE — 25000003 PHARM REV CODE 250: Performed by: NURSE PRACTITIONER

## 2025-01-10 PROCEDURE — 25000003 PHARM REV CODE 250: Performed by: HOSPITALIST

## 2025-01-10 RX ADMIN — TRAMADOL HYDROCHLORIDE 100 MG: 50 TABLET, COATED ORAL at 07:01

## 2025-01-10 RX ADMIN — SENNOSIDES AND DOCUSATE SODIUM 1 TABLET: 50; 8.6 TABLET ORAL at 08:01

## 2025-01-10 RX ADMIN — ENOXAPARIN SODIUM 40 MG: 40 INJECTION SUBCUTANEOUS at 05:01

## 2025-01-10 RX ADMIN — FERROUS SULFATE TAB 325 MG (65 MG ELEMENTAL FE) 1 EACH: 325 (65 FE) TAB at 08:01

## 2025-01-10 NOTE — PLAN OF CARE
Problem: Fall Injury Risk  Goal: Absence of Fall and Fall-Related Injury  Outcome: Progressing     Problem: Skin Injury Risk Increased  Goal: Skin Health and Integrity  Outcome: Progressing     Problem: Adult Inpatient Plan of Care  Goal: Absence of Hospital-Acquired Illness or Injury  Outcome: Progressing     Problem: Adult Inpatient Plan of Care  Goal: Plan of Care Review  Outcome: Progressing

## 2025-01-10 NOTE — PROGRESS NOTES
[facility]                                  Skilled Nursing Facility                   Progress Note     Admit Date: 1/3/2025  PAWAN 1/21/2025  [id number]  Principal Problem:  Fracture of ramus of left pubis   HPI obtained from patient interview and chart review     Chief Complaint: Re-evaluation of medical treatment and therapy status, hypotension    HPI:   Taiwo Boyd is a 89 y.o.female with a PMHx of Anemia, CKD stage 3, HTN, HLD, and Secondary hyperparathyroidism who presents to SNF following hospital admission for mildly displaced left superior and inferior pubic rami fractures being treated with conservative management.  Admission to SNF for secondary weakness and debility.    Interval history:  24 hr vital sign ranges reviewed and listed below.  BP improved.  Patient denies shortness of breath, abdominal discomfort, nausea, or vomiting.  Patient reports an adequate appetite.  Patient denies dysuria.  Patient reports having regular bowel movements.  Patient progressing with PT/OT- patient ambulated 91ft +59ft with RW and (S) for safety. Pt with step to GT pattern and decrease jacky. Pt needed seated rest breaks between GT trials d.t B U/E fatigue. Continuing to follow and treat all acute and chronic conditions.    Past Medical History: Patient has a past medical history of Anemia in stage 3 chronic kidney disease (4/19/2018), CKD (chronic kidney disease) stage 3, GFR 30-59 ml/min, Disorder of kidney and ureter, Hypertension, Hyponatremia, and Secondary hyperparathyroidism.    Past Surgical History: Patient has a past surgical history that includes Hysterectomy; Tonsillectomy; and Appendectomy.    Social History: Patient reports that she has never smoked. She has never used smokeless tobacco. She reports that she does not drink alcohol and does not use drugs.    Family History: Family history is unknown by patient.    Allergies:  "Patient is allergic to codeine phosphate.    ROS  Constitutional: Negative for fever   Eyes: Negative for blurred vision, double vision   Respiratory: Negative for cough, shortness of breath   Cardiovascular: Negative for chest pain, palpitations, and leg swelling.   Gastrointestinal: Negative for abdominal pain, constipation, diarrhea, nausea, vomiting.   Genitourinary: Negative for dysuria, frequency   Musculoskeletal:  + generalized weakness.  +mild intermittent hip pain  Skin: Negative for itching and rash.   Neurological: Negative for dizziness, headaches.   Psychiatric/Behavioral: Negative for depression. The patient is not nervous/anxious.      24 hour Vital Sign Range   Temp:  [98 °F (36.7 °C)-98.2 °F (36.8 °C)]   Pulse:  [60-70]   Resp:  [16-18]   BP: (111-140)/(74)   SpO2:  [96 %-97 %]     Current BMI: Body mass index is 26.17 kg/m².    PEx  Constitutional: Patient appears debilitated.  No distress noted  Cardiovascular: Normal rate, regular rhythm and normal heart sounds.    Pulmonary/Chest: Effort normal and breath sounds are clear  Abdominal: Soft. Bowel sounds are normal.   Musculoskeletal: Normal range of motion.   Neurological: Alert and oriented to person, place, and time.   Psychiatric: Normal mood and affect. Behavior is normal.   Skin: Skin is warm and dry.     No results for input(s): "GLUCOSE", "NA", "K", "CL", "CO2", "BUN", "CREATININE", "CALCIUM", "MG" in the last 24 hours.      No results for input(s): "WBC", "RBC", "HGB", "HCT", "PLT", "MCV", "MCH", "MCHC" in the last 24 hours.      No results for input(s): "POCTGLUCOSE" in the last 168 hours.     Assessment and Plan:    L superior pubic rami fracture  Left inferior pubic rami fracture   s/p ground level fall   - CT scan (12/28) Mildly displaced fractures of the left superior and inferior pubic rami. Suspected nondisplaced S5 anterior cortex fracture. Small amount of hemorrhage in the left anterior pelvis.  - Ortho consulted - "No operative " "intervention necessary"  - Continue PT/OT, WBAT  - Improved, continue tramadol 100 mg daily, tramadol 50 q.6 hours PRN, acetaminophen PRN  - follow-up with orthopedics as outpatient, scheduled for 1/14 with x-rays     Hypertension  - Home regimen with  Carvedilol 3.125 mg BID, HCTZ 12.5 mg, Irbesartan 300 mg daily  - was bradycardic in acute setting, carvedilol and HCTZ were discontinued but then ordered on discharge orders  - discontinue Coreg and HCTZ, continue losartan mg daily in place of non formulary irbesartan  - 1/10 BP improved, continue to hold losartan    CKD stage 3  - follows as outpatient with Nephrology, Dr. Celestin,  last seen on 06/10/2024  - sCr baseline is normal, BUN mildly elevated chronically  - stable, continue monitor twice weekly BMPs, avoid nephrotoxic agents, renally dose medications as appropriate      Normocytic anemia   - Ferritin- 383, iron- 55, B12- 220, folate 8.6  - transfuse for hemoglobin < 7  - stable, continue monitor twice weekly CBC    Transaminitis  - hold atorvastatin  - persisting, change acetaminophen to PRN, continue monitor twice weekly CMPs     Hyperlipidemia  - hold atorvastatin 40 mg qHS    Secondary hyperparathyroidism of renal origin   - follows as outpatient with Nephrology    Debility   - Continue with PT/OT for gait training and strengthening and restoration of ADL's   - Encourage mobility, OOB in chair, and early ambulation as appropriate  - Fall precautions   - Monitor for bowel and bladder dysfunction  - Monitor for and prevent skin breakdown and pressure ulcers  - continue DVT prophylaxis with Lovenox 40 mg daily      Anticipate disposition:  Home with home health    IP [facility] RISK OF UNPLANNED READMISSION Model: MODERATE     Follow-up needed during SNF stay-orthopedics (1/14)- will need to go to clinic    Follow-up needed after discharge from SNF: PCP     Future Appointments   Date Time Provider Department Center   1/14/2025 10:45 AM [facility] XR-A LIMIT 350 " LBS [facility] XRAY OP [facility]   1/14/2025 11:30 AM Manny Rodriguez MD [facility] [id number] [facility]   3/10/2025 11:00 AM Poonam Alexis MD [facility]   4/14/2025 11:00 AM Fernando Lamas MD [facility]     I certify that SNF services are required to be given on an inpatient basis because Taiwo Boyd needs for skilled nursing care and/or skilled rehabilitation are required on a daily basis and such services can only practically be provided in a skilled nursing facility setting and are for an ongoing condition for which she received inpatient care in the hospital.       Lorri Bowie NP  Department of Hospital Medicine   [facility]     DOS: 1/10/2025       Patient note was created using MModal Dictation.  Any errors in syntax or even information may not have been identified and edited on initial review prior to signing this note.

## 2025-01-11 PROCEDURE — 97110 THERAPEUTIC EXERCISES: CPT | Mod: CO

## 2025-01-11 PROCEDURE — 97116 GAIT TRAINING THERAPY: CPT | Mod: CQ

## 2025-01-11 PROCEDURE — 63600175 PHARM REV CODE 636 W HCPCS: Performed by: NURSE PRACTITIONER

## 2025-01-11 PROCEDURE — 11000004 HC SNF PRIVATE

## 2025-01-11 PROCEDURE — 25000003 PHARM REV CODE 250: Performed by: HOSPITALIST

## 2025-01-11 PROCEDURE — 97110 THERAPEUTIC EXERCISES: CPT | Mod: CQ

## 2025-01-11 PROCEDURE — 25000003 PHARM REV CODE 250: Performed by: NURSE PRACTITIONER

## 2025-01-11 PROCEDURE — 97530 THERAPEUTIC ACTIVITIES: CPT | Mod: CQ

## 2025-01-11 PROCEDURE — 97530 THERAPEUTIC ACTIVITIES: CPT | Mod: CO

## 2025-01-11 RX ADMIN — ENOXAPARIN SODIUM 40 MG: 40 INJECTION SUBCUTANEOUS at 04:01

## 2025-01-11 RX ADMIN — FERROUS SULFATE TAB 325 MG (65 MG ELEMENTAL FE) 1 EACH: 325 (65 FE) TAB at 08:01

## 2025-01-11 RX ADMIN — SENNOSIDES AND DOCUSATE SODIUM 1 TABLET: 50; 8.6 TABLET ORAL at 08:01

## 2025-01-11 RX ADMIN — TRAMADOL HYDROCHLORIDE 100 MG: 50 TABLET, COATED ORAL at 08:01

## 2025-01-11 NOTE — PT/OT/SLP PROGRESS
"Physical Therapy Treatment    Patient Name:  Taiwo Boyd   MRN:  [id number]  Admit Date: 1/3/2025  Admitting Diagnosis: Fracture of ramus of left pubis  Recent Surgeries:     General Precautions: Standard, fall  Orthopedic Precautions: LLE weight bearing as tolerated, RLE weight bearing as tolerated  Braces: N/A    Recommendations:     Discharge Recommendations: home health PT  Level of Assistance Recommended at Discharge: 24 hours light assistance  Discharge Equipment Recommendations: wheelchair, walker, rolling, bath bench  Barriers to discharge: Decreased caregiver support    Assessment:     Taiwo Boyd is a 89 y.o. female admitted with a medical diagnosis of Fracture of ramus of left pubis . Pt tolerated well, pt is very pleasant, demo good effort, talkative, pt would continue to benefit from skilled PT services to improve overall functional mobility, strength and endurance.  .      Performance deficits affecting function: weakness, impaired endurance, impaired self care skills, impaired functional mobility, gait instability, impaired balance, decreased upper extremity function, decreased lower extremity function, impaired cardiopulmonary response to activity, orthopedic precautions.    Rehab Potential is good    Activity Tolerance: Fair    Plan:     Patient to be seen 5 x/week to address the above listed problems via gait training, therapeutic activities, therapeutic exercises, neuromuscular re-education, wheelchair management/training    Plan of Care Expires: 02/03/25  Plan of Care Reviewed with: patient    Subjective     "OK"    Pain/Comfort:  Pain Rating 1: 2/10 (0 at rest 2 with mobility)  Location - Side 1: Left  Location - Orientation 1: generalized  Location 1:  (pelvic)  Pain Addressed 1: Pre-medicate for activity, Reposition, Distraction, Cessation of Activity  Pain Rating Post-Intervention 1: 2/10 (no further mentioned)    Patient's cultural, spiritual, Judaism conflicts given the current " "situation:  no    Objective:     .  Patient found up in chair with  (in BSC) upon PT entry to room.     Therapeutic Activities and Exercises: recumbent cross  x 10 min L-5    Functional Mobility:  Transfers:     Sit to Stand:  supervision with rolling walker and vcs to ensure brakes  BSC  to WChair: supervision with  rolling walker  using  Stand Pivot and WC<>nustep  Gait: amb with RW SBA/S step to gait pattern 158 ft no LOB slow/fair jacky, inc BUE support on RW off loading LEs  Stairs:  asc/feliz 4 steps with BHR SBA vcs for safety/tech  Curb asd/feliz 4" curb with RW SBA vcs for safety/tech    AM-PAC 6 CLICK MOBILITY  18    Patient left up in chair with  with OT .    GOALS:   Multidisciplinary Problems       Physical Therapy Goals          Problem: Physical Therapy    Goal Priority Disciplines Outcome Interventions   Physical Therapy Goal     PT, PT/OT Progressing    Description: Goals to be met by: 2/4/25     Patient will increase functional independence with mobility by performing:    . Supine to sit with [place]  . Sit to supine with [place]  . Rolling to Left and Right with [place].  . Sit to stand transfer with [place]  . Bed to chair transfer with Modified [place] using LRAD  . Gait  x 150 feet with Supervision using LRAD  . Wheelchair propulsion x150 feet with Supervision using bilateral uppper extremities  . Ascend/descend 4 stair with bilateral Handrails Stand-by Assistance using No Assistive Device.   . Ascend/Descend 4 inch curb step with Stand-by Assistance using Rolling Walker.                         Time Tracking:     PT Received On: 01/11/25  PT Start Time: 0852  PT Stop Time: 0932  PT Total Time (min): 40 min    Billable Minutes: Gait Training 12, Therapeutic Activity 18, and Therapeutic Exercise 10    Treatment Type: Treatment  PT/PTA: PTA     Number of PTA visits since last PT visit: 1 01/11/2025  "

## 2025-01-11 NOTE — PLAN OF CARE
Problem: Adult Inpatient Plan of Care  Goal: Plan of Care Review  Outcome: Progressing  Flowsheets (Taken 1/11/2025 5157)  Plan of Care Reviewed With: patient  Goal: Patient-Specific Goal (Individualized)  Outcome: Progressing  Goal: Absence of Hospital-Acquired Illness or Injury  Outcome: Progressing  Goal: Optimal Comfort and Wellbeing  Outcome: Progressing  Goal: Readiness for Transition of Care  Outcome: Progressing     Problem: Adult Inpatient Plan of Care  Goal: Patient-Specific Goal (Individualized)  Outcome: Progressing     Problem: Adult Inpatient Plan of Care  Goal: Absence of Hospital-Acquired Illness or Injury  Outcome: Progressing     Problem: Adult Inpatient Plan of Care  Goal: Readiness for Transition of Care  Outcome: Progressing     Problem: Adult Inpatient Plan of Care  Goal: Optimal Comfort and Wellbeing  Outcome: Progressing     Problem: Fall Injury Risk  Goal: Absence of Fall and Fall-Related Injury  Outcome: Progressing     Problem: Skin Injury Risk Increased  Goal: Skin Health and Integrity  Outcome: Progressing

## 2025-01-11 NOTE — PT/OT/SLP PROGRESS
Occupational Therapy   Treatment    Name: Taiwo Boyd  MRN: [id number]  Admit Date: 1/3/2025  Admitting Diagnosis:  Fracture of ramus of left pubis    General Precautions: Standard, fall   Orthopedic Precautions: LLE weight bearing as tolerated   Braces: N/A    Recommendations:     Discharge Recommendations:  home with home health  Level of Assistance Recommended at Discharge: 24 hours supervision for safety in performing ADL's and home management tasks  Discharge Equipment Recommendations: 3-in-1 commode, bath bench, hip kit  Barriers to discharge:  Decreased caregiver support    Assessment:     Taiwo Boyd is a 89 y.o. female with a medical diagnosis of Fracture of ramus of left pubis.  She presents with performance deficits affecting function are weakness, impaired endurance, impaired self care skills, impaired functional mobility, gait instability, impaired balance, decreased upper extremity function, decreased lower extremity function, impaired cardiopulmonary response to activity, orthopedic precautions.  Pt tolerated session well and without incident, but she continues to require assistance to perform self-care tasks and mobility.  She would continue to benefit from skilled OT services at SNF to maximize her gains in functional independence.      Rehab Potential is good    Activity tolerance:  Good    Plan:     Patient to be seen 5 x/week to address the above listed problems via self-care/home management, therapeutic activities, therapeutic exercises    Plan of Care Expires: 01/21/25  Plan of Care Reviewed with: patient    Subjective   Pt agreeable to session.      Pain/Comfort:  Pain Rating 1: 0/10  Pain Rating Post-Intervention 1: 0/10    Patient's cultural, spiritual, Muslim conflicts given the current situation:  no    Objective:     Patient found up in chair with  (no active lines) upon OT entry to room.    [facility] 6 Click ADL: 22    OT Exercises:   Pt performed BUE EX using 2# dowel 2x15 reps: wrist  flex/ext, elbow flex/ext, shoulder protraction/retraction, shoulder rows and shoulder flex/ext with Supervision to increase BUE strength and endurance for ADLs and functional mobility      Therapeutic Activity:   Pt performed dynamic standing balance with Sup using RW while performing bilateral ax for 6 min to increase standing balance and tolerance for ADLs and functional mobility     Treatment & Education:  Standing balance/tolerance and BUE EX    Patient left up in chair with all lines intact and call button in reach    GOALS:   Multidisciplinary Problems       Occupational Therapy Goals          Problem: Occupational Therapy    Goal Priority Disciplines Outcome Interventions   Occupational Therapy Goal     OT, PT/OT Progressing    Description: Goals to be met by: 2/3/2025     Patient will increase functional independence with ADLs by performing:    UE Dressing with Set up A- Met  ~UE dressing /c Mod I- Met  LE Dressing with Set-up Assistance- Ongoing  Grooming while seated at sink with Modified [place]- Met  Toileting from toilet with Mod I for hygiene and clothing management- Met  Bathing from  shower chair/bench with Supervision- Met  Toilet transfer to toilet with Modified [place]- Ongoing  Pt will tolerate standing for 10 minutes while completing dynamic task with SPV using LRAD- Ongoing  FWM with mod I using AE as needed- Met                          Time Tracking:     OT Date of Treatment: 01/11/25  OT Start Time: 0940    OT Stop Time: 1009  OT Total Time (min): 29 min    Billable Minutes:Therapeutic Activity 6  Therapeutic Exercise 23    1/11/2025

## 2025-01-12 PROCEDURE — 63600175 PHARM REV CODE 636 W HCPCS: Performed by: NURSE PRACTITIONER

## 2025-01-12 PROCEDURE — 25000003 PHARM REV CODE 250: Performed by: HOSPITALIST

## 2025-01-12 PROCEDURE — 11000004 HC SNF PRIVATE

## 2025-01-12 RX ADMIN — FERROUS SULFATE TAB 325 MG (65 MG ELEMENTAL FE) 1 EACH: 325 (65 FE) TAB at 08:01

## 2025-01-12 RX ADMIN — ENOXAPARIN SODIUM 40 MG: 40 INJECTION SUBCUTANEOUS at 04:01

## 2025-01-12 RX ADMIN — SENNOSIDES AND DOCUSATE SODIUM 1 TABLET: 50; 8.6 TABLET ORAL at 08:01

## 2025-01-12 NOTE — PLAN OF CARE
Problem: Adult Inpatient Plan of Care  Goal: Plan of Care Review  Outcome: Progressing  Flowsheets (Taken 1/12/2025 7700)  Plan of Care Reviewed With: patient  Goal: Patient-Specific Goal (Individualized)  Outcome: Progressing  Goal: Absence of Hospital-Acquired Illness or Injury  Outcome: Progressing  Goal: Optimal Comfort and Wellbeing  Outcome: Progressing  Goal: Readiness for Transition of Care  Outcome: Progressing     Problem: Adult Inpatient Plan of Care  Goal: Patient-Specific Goal (Individualized)  Outcome: Progressing     Problem: Adult Inpatient Plan of Care  Goal: Absence of Hospital-Acquired Illness or Injury  Outcome: Progressing     Problem: Adult Inpatient Plan of Care  Goal: Optimal Comfort and Wellbeing  Outcome: Progressing     Problem: Adult Inpatient Plan of Care  Goal: Readiness for Transition of Care  Outcome: Progressing     Problem: Fall Injury Risk  Goal: Absence of Fall and Fall-Related Injury  Outcome: Progressing     Problem: Skin Injury Risk Increased  Goal: Skin Health and Integrity  Outcome: Progressing

## 2025-01-13 LAB
ALBUMIN SERPL BCP-MCNC: 3 G/DL (ref 3.5–5.2)
ALP SERPL-CCNC: 150 U/L (ref 40–150)
ALT SERPL W/O P-5'-P-CCNC: 63 U/L (ref 10–44)
ANION GAP SERPL CALC-SCNC: 7 MMOL/L (ref 8–16)
AST SERPL-CCNC: 27 U/L (ref 10–40)
BASOPHILS # BLD AUTO: 0.04 K/UL (ref 0–0.2)
BASOPHILS NFR BLD: 0.7 % (ref 0–1.9)
BILIRUB SERPL-MCNC: 0.3 MG/DL (ref 0.1–1)
BUN SERPL-MCNC: 43 MG/DL (ref 8–23)
CALCIUM SERPL-MCNC: 9.1 MG/DL (ref 8.7–10.5)
CHLORIDE SERPL-SCNC: 109 MMOL/L (ref 95–110)
CO2 SERPL-SCNC: 23 MMOL/L (ref 23–29)
CREAT SERPL-MCNC: 0.8 MG/DL (ref 0.5–1.4)
DIFFERENTIAL METHOD BLD: ABNORMAL
EOSINOPHIL # BLD AUTO: 0.3 K/UL (ref 0–0.5)
EOSINOPHIL NFR BLD: 5.2 % (ref 0–8)
ERYTHROCYTE [DISTWIDTH] IN BLOOD BY AUTOMATED COUNT: 14.2 % (ref 11.5–14.5)
EST. GFR  (NO RACE VARIABLE): >60 ML/MIN/1.73 M^2
GLUCOSE SERPL-MCNC: 91 MG/DL (ref 70–110)
HCT VFR BLD AUTO: 29.1 % (ref 37–48.5)
HGB BLD-MCNC: 9.3 G/DL (ref 12–16)
IMM GRANULOCYTES # BLD AUTO: 0.01 K/UL (ref 0–0.04)
IMM GRANULOCYTES NFR BLD AUTO: 0.2 % (ref 0–0.5)
LYMPHOCYTES # BLD AUTO: 1.8 K/UL (ref 1–4.8)
LYMPHOCYTES NFR BLD: 32.7 % (ref 18–48)
MAGNESIUM SERPL-MCNC: 2.1 MG/DL (ref 1.6–2.6)
MCH RBC QN AUTO: 30.8 PG (ref 27–31)
MCHC RBC AUTO-ENTMCNC: 32 G/DL (ref 32–36)
MCV RBC AUTO: 96 FL (ref 82–98)
MONOCYTES # BLD AUTO: 0.6 K/UL (ref 0.3–1)
MONOCYTES NFR BLD: 11.3 % (ref 4–15)
NEUTROPHILS # BLD AUTO: 2.8 K/UL (ref 1.8–7.7)
NEUTROPHILS NFR BLD: 49.9 % (ref 38–73)
NRBC BLD-RTO: 0 /100 WBC
PHOSPHATE SERPL-MCNC: 3.3 MG/DL (ref 2.7–4.5)
PLATELET # BLD AUTO: 291 K/UL (ref 150–450)
PMV BLD AUTO: 10.8 FL (ref 9.2–12.9)
POTASSIUM SERPL-SCNC: 4.7 MMOL/L (ref 3.5–5.1)
PROT SERPL-MCNC: 5.8 G/DL (ref 6–8.4)
RBC # BLD AUTO: 3.02 M/UL (ref 4–5.4)
SODIUM SERPL-SCNC: 139 MMOL/L (ref 136–145)
WBC # BLD AUTO: 5.6 K/UL (ref 3.9–12.7)

## 2025-01-13 PROCEDURE — 63600175 PHARM REV CODE 636 W HCPCS: Performed by: NURSE PRACTITIONER

## 2025-01-13 PROCEDURE — 97110 THERAPEUTIC EXERCISES: CPT | Mod: CQ

## 2025-01-13 PROCEDURE — 25000003 PHARM REV CODE 250: Performed by: NURSE PRACTITIONER

## 2025-01-13 PROCEDURE — 97530 THERAPEUTIC ACTIVITIES: CPT | Mod: CO

## 2025-01-13 PROCEDURE — 97530 THERAPEUTIC ACTIVITIES: CPT | Mod: CQ

## 2025-01-13 PROCEDURE — 25000003 PHARM REV CODE 250: Performed by: HOSPITALIST

## 2025-01-13 PROCEDURE — 85025 COMPLETE CBC W/AUTO DIFF WBC: CPT | Performed by: HOSPITALIST

## 2025-01-13 PROCEDURE — 97116 GAIT TRAINING THERAPY: CPT | Mod: CQ

## 2025-01-13 PROCEDURE — 80053 COMPREHEN METABOLIC PANEL: CPT | Performed by: HOSPITALIST

## 2025-01-13 PROCEDURE — 36415 COLL VENOUS BLD VENIPUNCTURE: CPT | Performed by: HOSPITALIST

## 2025-01-13 PROCEDURE — 84100 ASSAY OF PHOSPHORUS: CPT | Performed by: HOSPITALIST

## 2025-01-13 PROCEDURE — 97110 THERAPEUTIC EXERCISES: CPT | Mod: CO

## 2025-01-13 PROCEDURE — 83735 ASSAY OF MAGNESIUM: CPT | Performed by: HOSPITALIST

## 2025-01-13 PROCEDURE — 11000004 HC SNF PRIVATE

## 2025-01-13 RX ADMIN — FERROUS SULFATE TAB 325 MG (65 MG ELEMENTAL FE) 1 EACH: 325 (65 FE) TAB at 08:01

## 2025-01-13 RX ADMIN — TRAMADOL HYDROCHLORIDE 100 MG: 50 TABLET, COATED ORAL at 08:01

## 2025-01-13 RX ADMIN — ENOXAPARIN SODIUM 40 MG: 40 INJECTION SUBCUTANEOUS at 04:01

## 2025-01-13 RX ADMIN — SENNOSIDES AND DOCUSATE SODIUM 1 TABLET: 50; 8.6 TABLET ORAL at 08:01

## 2025-01-13 NOTE — PT/OT/SLP PROGRESS
"Occupational Therapy   Treatment    Name: Taiwo Boyd  MRN: [id number]  Admit Date: 1/3/2025  Admitting Diagnosis:  Fracture of ramus of left pubis    General Precautions: Standard, fall   Orthopedic Precautions: LLE weight bearing as tolerated   Braces: N/A    Recommendations:     Discharge Recommendations:  home with home health  Level of Assistance Recommended at Discharge: 24 hours supervision for safety in performing ADL's and home management tasks  Discharge Equipment Recommendations: 3-in-1 commode, bath bench, hip kit  Barriers to discharge:  Decreased caregiver support    Assessment:     Taiwo Boyd is a 89 y.o. female with a medical diagnosis of Fracture of ramus of left pubis .  She presents with performance deficits affecting function are weakness, impaired endurance, impaired self care skills, impaired functional mobility, gait instability, impaired balance, decreased upper extremity function, decreased lower extremity function, impaired cardiopulmonary response to activity, orthopedic precautions. Pt participated well during today's session. Pt tolerated tx session without incident and is making progress, however, continues to demonstrate deficits with self care skills, balance, functional mobility, UB strength and endurance. Pt will benefit from continued OT services to progress towards goals.     Rehab Potential is good    Activity tolerance:  Good    Plan:     Patient to be seen 5 x/week to address the above listed problems via self-care/home management, therapeutic activities, therapeutic exercises    Plan of Care Expires: 01/21/25  Plan of Care Reviewed with: patient    Subjective   "I didn't get any sleep last night"  Communicated with: Cristina prior to session.     Pain/Comfort:  Pain Rating 1: 0/10  Pain Rating Post-Intervention 1: 0/10    Patient's cultural, spiritual, Druze conflicts given the current situation:  no    Objective:     Patient found  in bedside chair  upon OT entry to " room.    Functional Mobility/Transfers:  Patient completed Sit <> Stand Transfer x 2 with contact guard assistance  with  rolling walker   Patient completed Bedside Chair <> Wheelchair Transfer x 2 using Step Transfer technique with contact guard assistance with rolling walker    Activities of Daily Living:  N/A, already completed.     [facility] 6 Click ADL: 22    OT Exercises: UE Ergometer 10 min with mod resistance   AROM x 2 sets of 15 with 2 lb dowel. Pt perform shoulder flex/ext, forward flex motion and bicep curls.   Therex performed to improve overall endurance, ROM and UB strength required for functional transfers, ADL's and w/c propulsion.     Treatment & Education:  Pt with functional activity on today with CGA/SBA with no AD, pt use counter for support. Pt at raised counter to perform kitchen management activity with focus on standing tolerance, dynamic standing bal, functional reaching, crossing midline, and to promote independence with homemaking and self care tasks. Pt tolerate stance for - 5:37 min/sec.     Pt educated on:  - role of OT  - level of assistance  - energy conservation and task modification to maximize independence with ADL's and mobility   -  safety while performing functional transfers and self care tasks  - orthopedic precautions.   - progress towards OT goals     Patient left up in chair with call button in reach    GOALS:   Multidisciplinary Problems       Occupational Therapy Goals          Problem: Occupational Therapy    Goal Priority Disciplines Outcome Interventions   Occupational Therapy Goal     OT, PT/OT Progressing    Description: Goals to be met by: 2/3/2025     Patient will increase functional independence with ADLs by performing:    UE Dressing with Set up A- Met  ~UE dressing /c Mod I- Met  LE Dressing with Set-up Assistance- Ongoing  Grooming while seated at sink with Modified [place]- Met  Toileting from toilet with Mod I for hygiene and clothing management-  Met  Bathing from  shower chair/bench with Supervision- Met  Toilet transfer to toilet with Modified [place]- Ongoing  Pt will tolerate standing for 10 minutes while completing dynamic task with SPV using LRAD- Ongoing  FWM with mod I using AE as needed- Met                          Time Tracking:     OT Date of Treatment: 01/13/25  OT Start Time: 1037    OT Stop Time: 1125  OT Total Time (min): 48 min    Billable Minutes:Therapeutic Activity 30  Therapeutic Exercise 18    1/13/2025  A client care conference was performed between the LOTAMBER and RANDY, prior to treatment by PADILLA, to discuss the patient's status, treatment plan and established goals.

## 2025-01-13 NOTE — PROGRESS NOTES
[facility] - Skilled Nursing  Adult Nutrition  Progress Note    SUMMARY     Recommendations  Continue regular diet, assist with meals  RD following    Goals: PO to meet 85% of EEN/EPN by next RD follow up    Nutrition Goal Status: new  Communication of RD Recs: other (comment) (POC)    Assessment and Plan  Nutrition Problem  Self feeding difficulty    Related to (etiology):    Weakness     Signs and Symptoms (as evidenced by):   Pt with debility and requiring assistance with all ADL's      Interventions:  Collaboration by nutrition professional with other providers   Vitamin supplement therapy- Vit D, recommend Vit C   Mineral supplement- FE   Feeding assistance   General healthful diet     Nutrition Diagnosis Status:   Continues    Malnutrition Assessment 1/6/25  Orbital Region (Subcutaneous Fat Loss): moderate depletion  Upper Arm Region (Subcutaneous Fat Loss): mild depletion   [place] Region (Muscle Loss): moderate depletion  Clavicle Bone Region (Muscle Loss): mild depletion  Clavicle and Acromion Bone Region (Muscle Loss): mild depletion  Dorsal Hand (Muscle Loss): moderate depletion  Patellar Region (Muscle Loss): well nourished  Anterior Thigh Region (Muscle Loss): well nourished  Posterior Calf Region (Muscle Loss): well nourished     Reason for Assessment  Reason For Assessment: RD follow-up  Diagnosis:  (Fracture of ramus of left pubis)  General Information Comments: Pt is currently claudia regular diet. garfield-19/skin intact. Noted 75% meal intake. Secondary hyperparathyroidism. Presents to SNF following hospital admission for mildly displaced left super and inferior pubic rami fracture being treated with conservative management. Admission to SNF for secondary weakness and debility. Adequate appetite. Noted 8 lb weight loss in 9 months. Pt would not like supplements at th is time.  Nutrition Discharge Planning: d/c on cardiac diet    Nutrition/Diet History  Spiritual, Cultural Beliefs, Zoroastrian Practices,  "Values that Affect Care: no  Food Allergies: NKFA  Factors Affecting Nutritional Intake: None identified at this time    Food Insecurity: Patient Unable To Answer (2024)    Hunger Vital Sign     Worried About Running Out of Food in the Last Year: Patient unable to answer     Ran Out of Food in the Last Year: Patient unable to answer     Anthropometrics  Temp: 97.9 °F (36.6 °C)  Height Method: Stated  Height: 5' 2" (157.5 cm)  Height (inches): 62 in  Weight Method: Standard Scale  Weight: 64.9 kg (143 lb 1.3 oz)  Weight (lb): 143.08 lb  Ideal Body Weight (IBW), Female: 110 lb  % Ideal Body Weight, Female (lb): 130.07 %  BMI (Calculated): 26.2  BMI Grade: 25 - 29.9 - overweight  Usual Body Weight (UBW), k.6 kg  Weight Change Amount:  (weight loss over eight months)  % Usual Body Weight: 94.8  % Weight Change From Usual Weight: -5.39 %     Lab/Procedures/Meds  Pertinent Labs Reviewed: reviewed  Pertinent Labs Comments: BUn 43, ALT 63  Pertinent Medications Reviewed: reviewed  Pertinent Medications Comments: enoxaparin, ferrous sulfate, tramadol    Estimated/Assessed Needs  Weight Used For Calorie Calculations: 64.9 kg (143 lb 1.3 oz)  Energy Calorie Requirements (kcal): 1348  Energy Need Method: Sanborn-St Jeor (x 1.3(PAL))  Protein Requirements: 65-53  Weight Used For Protein Calculations: 64.9 kg (143 lb 1.3 oz) (x 1.0-.8gm/kg)  Fluid Requirements (mL): 1348 or per MD  Estimated Fluid Requirement Method: RDA Method  RDA Method (mL): 1348  CHO Requirement: -    Nutrition Prescription Ordered  Current Diet Order: Regular  Nutrition Order Comments: 75% PO    Evaluation of Received Nutrient/Fluid Intake  I/O: 1062/500  Energy Calories Required: meeting needs  Protein Required: meeting needs  Fluid Required: meeting needs  Comments: LBM-25  Tolerance: tolerating  % Intake of Estimated Energy Needs: 75 - 100 %  % Meal Intake: 75 - 100 %    Nutrition Risk  Level of Risk/Frequency of Follow-up: low (ONE TIME " PER WEEK)     Monitor and Evaluation  Food and Nutrient Intake: food and beverage intake  Food and Nutrient Adminstration: diet order  Physical Activity and Function: nutrition-related ADLs and IADLs  Anthropometric Measurements: weight change  Biochemical Data, Medical Tests and Procedures: gastrointestinal profile, electrolyte and renal panel  Nutrition-Focused Physical Findings: overall appearance, skin     Nutrition Follow-Up  RD Follow-up?: Yes

## 2025-01-13 NOTE — PROGRESS NOTES
Patient ID:   Taiwo Boyd is a 89 y.o. female.    Chief Complaint:   Pelvic fracture    HPI:   The patient is an 89-year-old female who sustained a ground level fall on December 28, 2024.  She was seen in the emergency department at [facility].  EConsult with Dr. Salazar was performed for left pubic rami fractures.  The patient was subsequently hospitalized for pain control and placement.  She was seen in the hospital by a PGY 5 resident.  She was also seen by Dr. Manzano.  Nonoperative treatment was recommended by everyone.  She is now here today to follow up with me.  She reports pain level is 0/10.  She feels like she is doing well.    Medications:  No current facility-administered medications for this visit.  No current outpatient medications on file.    Facility-Administered Medications Ordered in Other Visits:     acetaminophen tablet 500 mg, 500 mg, Oral, Q8H PRN, Lorri Bowie, NP    acetaminophen tablet 650 mg, 650 mg, Oral, Q6H PRN, Gustabo Olivarez MD    calcium carbonate 200 mg calcium (500 mg) chewable tablet 500 mg, 500 mg, Oral, BID PRN, Gustabo Olivarez MD    enoxaparin injection 40 mg, 40 mg, Subcutaneous, Daily, Lorri Bowie NP, 40 mg at 01/13/25 1648    [START ON 2/3/2025] ergocalciferol capsule 50,000 Units, 50,000 Units, Oral, Q30 Days, Gustabo Olivarez MD    ferrous sulfate tablet 1 each, 1 tablet, Oral, Daily, Gustabo Olivarez MD, 1 each at 01/14/25 0752    melatonin tablet 6 mg, 6 mg, Oral, Nightly PRN, Gustabo Olivarez MD, 6 mg at 01/05/25 2028    senna-docusate 8.6-50 mg per tablet 1 tablet, 1 tablet, Oral, BID, Gustabo Olivarez MD, 1 tablet at 01/14/25 0752    traMADoL tablet 100 mg, 100 mg, Oral, Daily, Lorri Bowie NP, 100 mg at 01/14/25 0752    traMADoL tablet 50 mg, 50 mg, Oral, Q6H PRN, Lorri Bowie, NP, 50 mg at 01/09/25 0884    Allergies:  Review of patient's allergies indicates:   Allergen Reactions    Codeine phosphate Rash and Other  (See Comments)       Past Medical History:  Past Medical History:   Diagnosis Date    Anemia in stage 3 chronic kidney disease 4/19/2018    CKD (chronic kidney disease) stage 3, GFR 30-59 ml/min     Disorder of kidney and ureter     Hypertension     Hyponatremia     Secondary hyperparathyroidism         Past Surgical History:  Past Surgical History:   Procedure Laterality Date    APPENDECTOMY      HYSTERECTOMY      TONSILLECTOMY         Social History:  Social History     Occupational History    Not on file   Tobacco Use    Smoking status: Never    Smokeless tobacco: Never   Substance and Sexual Activity    Alcohol use: No    Drug use: No    Sexual activity: Yes     Comment:  lives with spouse       Family History:  Family History   Family history unknown: Yes        ROS:  Review of Systems   Musculoskeletal:  Positive for joint pain and myalgias.   All other systems reviewed and are negative.      Vitals:  There were no vitals taken for this visit.    Physical Examination:  Comprehensive Orthopaedic Musculoskeletal Exam    General      Constitutional: appears stated age, well-developed and well-nourished    Scleral icterus: no    Labored breathing: no    Psychiatric: normal mood and affect and no acute distress    Neurological: alert and oriented x3    Skin: intact    Lymphadenopathy: none     Ortho Exam   Pelvic exam:  Minimal pain with log rolling of the left lower extremity.  No leg-length discrepancy.    Imaging:  I have independently reviewed the following imaging studies performed at [facility]:    EXAMINATION:  XR PELVIS COMPLETE MIN 3 VIEWS     CLINICAL HISTORY:  fracture;  Other specified fracture of left pubis, initial encounter for closed fracture     TECHNIQUE:  Pelvis radiograph series.     COMPARISON:  Left hip CT dated 12/28/2024     FINDINGS:  Portion of the sacrum obscured by bowel contents.  Minimally displaced left superior and inferior pubic rami fractures.  DJD at the hips and pubic  symphysis.  Lower lumbar spondylosis.     Impression:     As above.     Electronically signed by:Colton Santiago  Date:                                            12/30/2024  Time:                                           15:30    EXAMINATION:  CT HIP WITHOUT CONTRAST LEFT     CLINICAL HISTORY:  Fracture, hip;     TECHNIQUE:  Axial CT images of the left hip with sagittal and coronal reformats without intravenous contrast.     COMPARISON:  Radiographs from earlier the same date.     FINDINGS:  There is diffuse osteopenia.  There is a comminuted, mildly displaced fracture of the left superior pubic ramus.  There is a mildly displaced fracture of the left inferior pubic ramus.  There is a slight cortical irregularity of the 5th sacral segment, anterior cortex, suspicious for a nondisplaced fracture no additional fractures are seen.  There is mild joint space narrowing of the left femoroacetabular joint.  There is moderate joint space narrowing of the pubic symphysis with adjacent subchondral sclerosis and cystic changes.  There is no evidence of a joint effusion.  There is hemorrhage in the left anterior pelvis, adjacent to the superior pubic ramus fracture.  The hemorrhage abuts the left lateral wall of the urinary bladder.  The uterus is absent.  There is colonic diverticulosis without acute diverticulitis.  There is a moderate volume of stool in the rectum.     Impression:     1. Mildly displaced fractures of the left superior and inferior pubic rami.  2. Suspected nondisplaced S5 anterior cortex fracture.  3. Small amount of hemorrhage in the left anterior pelvis.     Electronically signed by:Gucci Li  Date:                                            12/28/2024  Time:                                           23:56    Assessment:  1. Pubic ramus fracture, left, closed, initial encounter      Plan:  Continue nonoperative management was recommended.  The patient may weight bear as tolerated on left lower  extremity.  Physical therapy for gait training and strengthening.  Follow up in 4-6 weeks with a new x-ray of the pelvis.     No follow-ups on file.

## 2025-01-13 NOTE — PLAN OF CARE
Recommendations  Continue regular diet, assist with meals  RD following    Goals: PO to meet 85% of EEN/EPN by next RD follow up

## 2025-01-13 NOTE — PLAN OF CARE
Problem: Occupational Therapy  Goal: Occupational Therapy Goal  Description: Goals to be met by: 2/3/2025     Patient will increase functional independence with ADLs by performing:    UE Dressing with Set up A- Met  ~UE dressing /c Mod I- Met  LE Dressing with Set-up Assistance- Ongoing  Grooming while seated at sink with Modified [place]- Met  Toileting from toilet with Mod I for hygiene and clothing management- Met  Bathing from  shower chair/bench with Supervision- Met  Toilet transfer to toilet with Modified [place]- Ongoing  Pt will tolerate standing for 10 minutes while completing dynamic task with SPV using LRAD- Ongoing  FWM with mod I using AE as needed- Met     Outcome: Progressing

## 2025-01-13 NOTE — TREATMENT PLAN
Family Training     Patient Name:  Taiwo Boyd   MRN:  [id number]  Admit Date: 1/3/2025       called to schedule family training this date. Pt's family/caregiver agreeable to attend training Mon 1/20@ 1pm     1/13/2025

## 2025-01-13 NOTE — PT/OT/SLP PROGRESS
"Physical Therapy Treatment    Patient Name:  Taiwo Boyd   MRN:  [id number]  Admit Date: 1/3/2025  Admitting Diagnosis: Fracture of ramus of left pubis  Recent Surgeries:     General Precautions: Standard, fall  Orthopedic Precautions: LLE weight bearing as tolerated, RLE weight bearing as tolerated  Braces: N/A    Recommendations:     Discharge Recommendations: home health PT  Level of Assistance Recommended at Discharge: 24 hours light assistance  Discharge Equipment Recommendations: wheelchair, walker, rolling, bath bench  Barriers to discharge: Decreased caregiver support    Assessment:     Taiwo Boyd is a 89 y.o. female admitted with a medical diagnosis of Fracture of ramus of left pubis . Pt tolerated well, pt would continue to benefit from skilled PT services to improve overall functional mobility, strength and endurance.  .      Performance deficits affecting function: weakness, impaired endurance, impaired self care skills, impaired functional mobility, gait instability, impaired balance, decreased upper extremity function, decreased lower extremity function, impaired cardiopulmonary response to activity, orthopedic precautions.    Rehab Potential is good    Activity Tolerance: Fair    Plan:     Patient to be seen 5 x/week to address the above listed problems via gait training, therapeutic activities, therapeutic exercises, neuromuscular re-education, wheelchair management/training    Plan of Care Expires: 02/03/25  Plan of Care Reviewed with: patient    Subjective     "Hanging in there" pt agreeable to therapy.     Pain/Comfort:  Pain Rating 1: 3/10 (0 at rest 3 with gait)  Location - Side 1: Left  Location - Orientation 1: generalized  Location 1: leg  Pain Addressed 1: Reposition, Distraction, Cessation of Activity (unsure if premeds)  Pain Rating Post-Intervention 1:  ("not bad")    Patient's cultural, spiritual, Adventist conflicts given the current situation:  no    Objective:     \  Patient found " " with  (in BSC) upon PT entry to room.     Therapeutic Activities and Exercises: recumbent cross  x 15 min L-5    Functional Mobility:  Transfers:     Sit to Stand:  supervision with rolling walker  BSC<> WChair: supervision and stand by assistance with  no AD  using  Stand Pivot and WC<>nustep  Gait: amb with RW SBA/S 140 ft no LOB step to gait pattern occ slight step through  Stairs:  asc/feliz 4 steps withBHR SBA  Curb asc/feliz 4" curb with RW close SBA vcs for safety/tech    AM-PAC 6 CLICK MOBILITY  18    Patient left up in chair with call button in reach and belongings in reach .    GOALS:   Multidisciplinary Problems       Physical Therapy Goals          Problem: Physical Therapy    Goal Priority Disciplines Outcome Interventions   Physical Therapy Goal     PT, PT/OT Progressing    Description: Goals to be met by: 2/4/25     Patient will increase functional independence with mobility by performing:    . Supine to sit with [place]  . Sit to supine with [place]  . Rolling to Left and Right with [place].  . Sit to stand transfer with [place]  . Bed to chair transfer with Modified [place] using LRAD  . Gait  x 150 feet with Supervision using LRAD  . Wheelchair propulsion x150 feet with Supervision using bilateral uppper extremities  . Ascend/descend 4 stair with bilateral Handrails Stand-by Assistance using No Assistive Device.   . Ascend/Descend 4 inch curb step with Stand-by Assistance using Rolling Walker.                         Time Tracking:     PT Received On: 01/13/25  PT Start Time: 0843  PT Stop Time: 0924  PT Total Time (min): 41 min    Billable Minutes: Gait Training 11, Therapeutic Activity 15, and Therapeutic Exercise 15    Treatment Type: Treatment  PT/PTA: PTA     Number of PTA visits since last PT visit: 2     01/13/2025  "

## 2025-01-13 NOTE — PROGRESS NOTES
[facility]                                  Skilled Nursing Facility                   Progress Note     Admit Date: 1/3/2025  PAWAN 1/21/2025  [id number]  Principal Problem:  Fracture of ramus of left pubis   HPI obtained from patient interview and chart review     Chief Complaint: Re-evaluation of medical treatment and therapy status, lab review, hypotension    HPI:   Taiwo Boyd is a 89 y.o.female with a PMHx of Anemia, CKD stage 3, HTN, HLD, and Secondary hyperparathyroidism who presents to SNF following hospital admission for mildly displaced left superior and inferior pubic rami fractures being treated with conservative management.  Admission to SNF for secondary weakness and debility.    Interval history:  All of today's labs reviewed and are listed below.  BUN improved to 43, AST/ALT 27/63 from 69/105.  24 hr vital sign ranges reviewed and listed below.  BP continues to be low to normal.  Patient states pain is well controlled Patient denies shortness of breath, abdominal discomfort, nausea, or vomiting.  Patient reports an adequate appetite.  Patient denies dysuria.  Patient reports having regular bowel movements.  Patient progressing with PT/OT- patient ambulated with RW SBA/S 140 ft no LOB step to gait pattern occ slight step through. Continuing to follow and treat all acute and chronic conditions.    Past Medical History: Patient has a past medical history of Anemia in stage 3 chronic kidney disease (4/19/2018), CKD (chronic kidney disease) stage 3, GFR 30-59 ml/min, Disorder of kidney and ureter, Hypertension, Hyponatremia, and Secondary hyperparathyroidism.    Past Surgical History: Patient has a past surgical history that includes Hysterectomy; Tonsillectomy; and Appendectomy.    Social History: Patient reports that she has never smoked. She has never used smokeless tobacco. She reports that she does not drink alcohol  "and does not use drugs.    Family History: Family history is unknown by patient.    Allergies: Patient is allergic to codeine phosphate.    ROS  Constitutional: Negative for fever   Eyes: Negative for blurred vision, double vision   Respiratory: Negative for cough, shortness of breath   Cardiovascular: Negative for chest pain, palpitations, and leg swelling.   Gastrointestinal: Negative for abdominal pain, constipation, diarrhea, nausea, vomiting.   Genitourinary: Negative for dysuria, frequency   Musculoskeletal:  + generalized weakness.  +mild intermittent hip pain  Skin: Negative for itching and rash.   Neurological: Negative for dizziness, headaches.   Psychiatric/Behavioral: Negative for depression. The patient is not nervous/anxious.      24 hour Vital Sign Range   Temp:  [97.9 °F (36.6 °C)-98.2 °F (36.8 °C)]   Pulse:  [64-69]   Resp:  [18]   BP: (103-130)/(66-70)   SpO2:  [97 %-98 %]     Current BMI: Body mass index is 26.17 kg/m².    PEx  Constitutional: Patient appears debilitated.  No distress noted  Cardiovascular: Normal rate, regular rhythm and normal heart sounds.    Pulmonary/Chest: Effort normal and breath sounds are clear  Abdominal: Soft. Bowel sounds are normal.   Musculoskeletal: Normal range of motion.   Neurological: Alert and oriented to person, place, and time.   Psychiatric: Normal mood and affect. Behavior is normal.   Skin: Skin is warm and dry.     Recent Labs   Lab 01/13/25  0456      K 4.7      CO2 23   BUN 43*   CREATININE 0.8   CALCIUM 9.1   MG 2.1         Recent Labs   Lab 01/13/25  0456   WBC 5.60   RBC 3.02*   HGB 9.3*   HCT 29.1*      MCV 96   MCH 30.8   MCHC 32.0         No results for input(s): "POCTGLUCOSE" in the last 168 hours.     Assessment and Plan:    L superior pubic rami fracture  Left inferior pubic rami fracture   s/p ground level fall   - CT scan (12/28) Mildly displaced fractures of the left superior and inferior pubic rami. Suspected nondisplaced " "S5 anterior cortex fracture. Small amount of hemorrhage in the left anterior pelvis.  - Ortho consulted - "No operative intervention necessary"  - Continue PT/OT, WBAT  - Improved, continue tramadol 100 mg daily, tramadol 50 q.6 hours PRN, acetaminophen PRN  - follow-up with orthopedics as outpatient, scheduled for 1/14 with x-rays     Hypertension  - Home regimen with  Carvedilol 3.125 mg BID, HCTZ 12.5 mg, Irbesartan 300 mg daily  - was bradycardic in acute setting, carvedilol and HCTZ were discontinued but then ordered on discharge orders  - discontinue Coreg and HCTZ, continue losartan mg daily in place of non formulary irbesartan  - 1/13 BP remains soft, continue to hold losartan    CKD stage 3  - follows as outpatient with Nephrology, Dr. Celestin,  last seen on 06/10/2024  - sCr baseline is normal, BUN mildly elevated chronically  - stable, BUN improved today, continue monitor twice weekly BMPs, avoid nephrotoxic agents, renally dose medications as appropriate      Normocytic anemia   - Ferritin- 383, iron- 55, B12- 220, folate 8.6  - transfuse for hemoglobin < 7  - stable, continue monitor twice weekly CBC    Transaminitis  - hold atorvastatin  - improved, continue monitor twice weekly CMPs     Hyperlipidemia  - continue to hold atorvastatin 40 mg qHS    Secondary hyperparathyroidism of renal origin   - follows as outpatient with Nephrology    Debility   - Continue with PT/OT for gait training and strengthening and restoration of ADL's   - Encourage mobility, OOB in chair, and early ambulation as appropriate  - Fall precautions   - Monitor for bowel and bladder dysfunction  - Monitor for and prevent skin breakdown and pressure ulcers  - continue DVT prophylaxis with Lovenox 40 mg daily      Anticipate disposition:  Home with home health    IP [facility] RISK OF UNPLANNED READMISSION Model: MODERATE     Follow-up needed during SNF stay-orthopedics (1/14)- will need to go to clinic    Follow-up needed after " discharge from SNF: PCP     Future Appointments   Date Time Provider Department Center   1/14/2025 10:45 AM [facility] XR-A LIMIT 350 LBS [facility] XRAY OP [facility]   1/14/2025 11:30 AM Manny Rodriguez MD [facility] [id number] [facility] [place] [facility]   3/10/2025 11:00 AM Poonam Alexis MD [facility]   4/14/2025 11:00 AM Fernando Lamas MD [facility]     I certify that SNF services are required to be given on an inpatient basis because Taiwo Boyd needs for skilled nursing care and/or skilled rehabilitation are required on a daily basis and such services can only practically be provided in a skilled nursing facility setting and are for an ongoing condition for which she received inpatient care in the hospital.     I spent 46 minutes reviewing patient records, examining, and counseling the patient/ patient's family with greater than 50% of the time spent in direct patient care and coordination.  Patient education provided, care coordination with staff      Lorri Bowie NP  Department of Hospital Medicine   [facility]     DOS: 1/13/2025       Patient note was created using MModal Dictation.  Any errors in syntax or even information may not have been identified and edited on initial review prior to signing this note.

## 2025-01-14 ENCOUNTER — OFFICE VISIT (OUTPATIENT)
Dept: ORTHOPEDICS | Facility: CLINIC | Age: 89
End: 2025-01-14
Payer: MEDICARE

## 2025-01-14 ENCOUNTER — HOSPITAL ENCOUNTER (OUTPATIENT)
Dept: RADIOLOGY | Facility: HOSPITAL | Age: 89
Discharge: HOME | End: 2025-01-14
Attending: ORTHOPAEDIC SURGERY
Payer: MEDICARE

## 2025-01-14 DIAGNOSIS — S32.592A PUBIC RAMUS FRACTURE, LEFT, CLOSED, INITIAL ENCOUNTER: Primary | ICD-10-CM

## 2025-01-14 DIAGNOSIS — R10.2 PELVIC PAIN: ICD-10-CM

## 2025-01-14 PROCEDURE — 1160F RVW MEDS BY RX/DR IN RCRD: CPT | Mod: CPTII,S$GLB,, | Performed by: ORTHOPAEDIC SURGERY

## 2025-01-14 PROCEDURE — 73502 X-RAY EXAM HIP UNI 2-3 VIEWS: CPT | Mod: 26,LT,, | Performed by: RADIOLOGY

## 2025-01-14 PROCEDURE — 99204 OFFICE O/P NEW MOD 45 MIN: CPT | Mod: S$GLB,,, | Performed by: ORTHOPAEDIC SURGERY

## 2025-01-14 PROCEDURE — 72190 X-RAY EXAM OF PELVIS: CPT | Mod: 26,59,, | Performed by: RADIOLOGY

## 2025-01-14 PROCEDURE — 1159F MED LIST DOCD IN RCRD: CPT | Mod: CPTII,S$GLB,, | Performed by: ORTHOPAEDIC SURGERY

## 2025-01-14 PROCEDURE — 3288F FALL RISK ASSESSMENT DOCD: CPT | Mod: CPTII,S$GLB,, | Performed by: ORTHOPAEDIC SURGERY

## 2025-01-14 PROCEDURE — 11000004 HC SNF PRIVATE

## 2025-01-14 PROCEDURE — 73502 X-RAY EXAM HIP UNI 2-3 VIEWS: CPT | Mod: TC,FY,LT

## 2025-01-14 PROCEDURE — 1126F AMNT PAIN NOTED NONE PRSNT: CPT | Mod: CPTII,S$GLB,, | Performed by: ORTHOPAEDIC SURGERY

## 2025-01-14 PROCEDURE — 1100F PTFALLS ASSESS-DOCD GE2>/YR: CPT | Mod: CPTII,S$GLB,, | Performed by: ORTHOPAEDIC SURGERY

## 2025-01-14 PROCEDURE — 25000003 PHARM REV CODE 250: Performed by: NURSE PRACTITIONER

## 2025-01-14 PROCEDURE — 63600175 PHARM REV CODE 636 W HCPCS: Performed by: NURSE PRACTITIONER

## 2025-01-14 PROCEDURE — 1111F DSCHRG MED/CURRENT MED MERGE: CPT | Mod: CPTII,S$GLB,, | Performed by: ORTHOPAEDIC SURGERY

## 2025-01-14 PROCEDURE — 72190 X-RAY EXAM OF PELVIS: CPT | Mod: TC,FY

## 2025-01-14 PROCEDURE — 99999 PR PBB SHADOW E&M-EST. PATIENT-LVL III: CPT | Mod: PBBFAC,,, | Performed by: ORTHOPAEDIC SURGERY

## 2025-01-14 PROCEDURE — 25000003 PHARM REV CODE 250: Performed by: HOSPITALIST

## 2025-01-14 RX ADMIN — FERROUS SULFATE TAB 325 MG (65 MG ELEMENTAL FE) 1 EACH: 325 (65 FE) TAB at 07:01

## 2025-01-14 RX ADMIN — SENNOSIDES AND DOCUSATE SODIUM 1 TABLET: 50; 8.6 TABLET ORAL at 08:01

## 2025-01-14 RX ADMIN — TRAMADOL HYDROCHLORIDE 100 MG: 50 TABLET, COATED ORAL at 07:01

## 2025-01-14 RX ADMIN — SENNOSIDES AND DOCUSATE SODIUM 1 TABLET: 50; 8.6 TABLET ORAL at 07:01

## 2025-01-14 RX ADMIN — ENOXAPARIN SODIUM 40 MG: 40 INJECTION SUBCUTANEOUS at 04:01

## 2025-01-14 NOTE — NURSING
Patient came back from appointment. Denies pain or discomfort. Patient in her recliner eating lunch

## 2025-01-14 NOTE — PLAN OF CARE
Problem: Adult Inpatient Plan of Care  Goal: Plan of Care Review  Outcome: Progressing  Flowsheets (Taken 1/13/2025 2200)  Plan of Care Reviewed With: patient  Goal: Patient-Specific Goal (Individualized)  Outcome: Progressing  Goal: Absence of Hospital-Acquired Illness or Injury  Outcome: Progressing  Goal: Optimal Comfort and Wellbeing  Outcome: Progressing  Goal: Readiness for Transition of Care  Outcome: Progressing     Problem: Fall Injury Risk  Goal: Absence of Fall and Fall-Related Injury  Outcome: Progressing  Intervention: Identify and Manage Contributors  Flowsheets (Taken 1/13/2025 2200)  Self-Care Promotion:   BADL personal objects within reach   BADL personal routines maintained   independence encouraged  Medication Review/Management: medications reviewed     Problem: Skin Injury Risk Increased  Goal: Skin Health and Integrity  Outcome: Progressing

## 2025-01-14 NOTE — PT/OT/SLP PROGRESS
Occupational Therapy      Patient Name:  Taiwo EDMOND Deb   MRN:  [id number]    Patient not seen today secondary to patient off the floor all AM for appointment. Patient returned late in PM, OT attempted, patient with fatigue. Will follow-up POC.    1/14/2025

## 2025-01-14 NOTE — PT/OT/SLP PROGRESS
Physical Therapy      Patient Name:  Taiwo EDMOND Deb   MRN:  [id number]    Patient not seen today secondary to inc fatigue after MD appt, still able to meet POC  . Will follow-up next PT session.

## 2025-01-14 NOTE — PLAN OF CARE
Skilled Nursing IDT Note  Date: 1/13/25      Patient Name:  Taiwo Boyd       Medical Record Number: [id number]   YOB: 1935  Sex: Female          Room/Bed:  AWCL846/AABC021 A  Payor Info:  Payor: Duriana MGD [facility] / Plan: Duriana CHOICES / Product Type: Medicare Advantage /      Admitting Diagnosis:   Other specified fracture of left pubis, initial encounter for closed fracture [S32.592A]  Fracture of ramus of left pubis [S32.592A]   Admit Date/Time:  1/3/2025  1:41 PM      Primary Diagnosis:  Fracture of ramus of left pubis  Principal Problem: Fracture of ramus of left pubis       Patient Active Problem List    Diagnosis Date Noted    Primary hypertension 01/03/2025    Fracture of ramus of left pubis 12/29/2024    Essential hypertension 04/05/2022    Secondary hyperparathyroidism of renal origin 04/05/2022    High cholesterol 05/27/2021    Vitamin D deficiency 03/03/2020    Anemia due to stage 3a chronic kidney disease 04/19/2018       Team Members Present: Kylee Denney LPN, , Ariana Song, Activities, Natalia Stevens, OT, Therapy, Marilyn Remy, RN, Charge Nurse, and Melly Chahal, Dietician     Patient/Family Present: Daughter, Jailene, via phone     Living Setting at Discharge: Patient lives alone in a single story home, threshold to get into the house, and with tub-shower combo that she does not use but instead takes sponge baths.     Anticipated Discharge Date:  1/21/25    Supervision Recommended at Discharge: 24 hour light assistance    Preferred Home Health: [facility]    Preferred Pharmacy: [facility]

## 2025-01-15 PROCEDURE — 97530 THERAPEUTIC ACTIVITIES: CPT

## 2025-01-15 PROCEDURE — 97116 GAIT TRAINING THERAPY: CPT

## 2025-01-15 PROCEDURE — 25000003 PHARM REV CODE 250: Performed by: NURSE PRACTITIONER

## 2025-01-15 PROCEDURE — 97110 THERAPEUTIC EXERCISES: CPT

## 2025-01-15 PROCEDURE — 11000004 HC SNF PRIVATE

## 2025-01-15 PROCEDURE — 63600175 PHARM REV CODE 636 W HCPCS: Performed by: NURSE PRACTITIONER

## 2025-01-15 PROCEDURE — 25000003 PHARM REV CODE 250: Performed by: HOSPITALIST

## 2025-01-15 RX ORDER — POLYETHYLENE GLYCOL 3350 17 G/17G
17 POWDER, FOR SOLUTION ORAL 2 TIMES DAILY PRN
Status: DISCONTINUED | OUTPATIENT
Start: 2025-01-15 | End: 2025-01-17 | Stop reason: HOSPADM

## 2025-01-15 RX ORDER — BISACODYL 10 MG/1
10 SUPPOSITORY RECTAL DAILY PRN
Status: DISCONTINUED | OUTPATIENT
Start: 2025-01-15 | End: 2025-01-17 | Stop reason: HOSPADM

## 2025-01-15 RX ADMIN — FERROUS SULFATE TAB 325 MG (65 MG ELEMENTAL FE) 1 EACH: 325 (65 FE) TAB at 09:01

## 2025-01-15 RX ADMIN — TRAMADOL HYDROCHLORIDE 100 MG: 50 TABLET, COATED ORAL at 08:01

## 2025-01-15 RX ADMIN — SENNOSIDES AND DOCUSATE SODIUM 1 TABLET: 50; 8.6 TABLET ORAL at 08:01

## 2025-01-15 RX ADMIN — ENOXAPARIN SODIUM 40 MG: 40 INJECTION SUBCUTANEOUS at 05:01

## 2025-01-15 RX ADMIN — BISACODYL 10 MG: 10 SUPPOSITORY RECTAL at 03:01

## 2025-01-15 RX ADMIN — Medication 6 MG: at 08:01

## 2025-01-15 RX ADMIN — POLYETHYLENE GLYCOL 3350 17 G: 17 POWDER, FOR SOLUTION ORAL at 08:01

## 2025-01-15 RX ADMIN — POLYETHYLENE GLYCOL 3350 17 G: 17 POWDER, FOR SOLUTION ORAL at 12:01

## 2025-01-15 NOTE — PROGRESS NOTES
[facility]                                  Skilled Nursing Facility                   Progress Note     Admit Date: 1/3/2025  PAWAN 1/21/2025  [id number] A  Principal Problem:  Fracture of ramus of left pubis   HPI obtained from patient interview and chart review     Chief Complaint: Re-evaluation of medical treatment and therapy status, hypotension    HPI:   Taiwo Boyd is a 89 y.o.female with a PMHx of Anemia, CKD stage 3, HTN, HLD, and Secondary hyperparathyroidism who presents to SNF following hospital admission for mildly displaced left superior and inferior pubic rami fractures being treated with conservative management.  Admission to SNF for secondary weakness and debility.    Interval history:   24 hr vital sign ranges reviewed and listed below.  BP with minor improvement.  Patient to follow-up in orthopedic clinic today with repeat x-rays.  Patient states pain is well controlled Patient denies shortness of breath, abdominal discomfort, nausea, or vomiting.  Patient reports an adequate appetite.  Patient denies dysuria.  Patient reports having regular bowel movements.  Patient progressing with PT/OT- patient ambulated with RW SBA/S 140 ft no LOB step to gait pattern occ slight step through. Continuing to follow and treat all acute and chronic conditions.    Past Medical History: Patient has a past medical history of Anemia in stage 3 chronic kidney disease (4/19/2018), CKD (chronic kidney disease) stage 3, GFR 30-59 ml/min, Disorder of kidney and ureter, Hypertension, Hyponatremia, and Secondary hyperparathyroidism.    Past Surgical History: Patient has a past surgical history that includes Hysterectomy; Tonsillectomy; and Appendectomy.    Social History: Patient reports that she has never smoked. She has never used smokeless tobacco. She reports that she does not drink alcohol and does not use drugs.    Family History: Family  "history is unknown by patient.    Allergies: Patient is allergic to codeine phosphate.    ROS  Constitutional: Negative for fever   Eyes: Negative for blurred vision, double vision   Respiratory: Negative for cough, shortness of breath   Cardiovascular: Negative for chest pain, palpitations, and leg swelling.   Gastrointestinal: Negative for abdominal pain, constipation, diarrhea, nausea, vomiting.   Genitourinary: Negative for dysuria, frequency   Musculoskeletal:  + generalized weakness.  +mild intermittent hip pain  Skin: Negative for itching and rash.   Neurological: Negative for dizziness, headaches.   Psychiatric/Behavioral: Negative for depression. The patient is not nervous/anxious.      24 hour Vital Sign Range   Temp:  [97.9 °F (36.6 °C)-98 °F (36.7 °C)]   Pulse:  [55-69]   Resp:  [16-18]   BP: (126-147)/(58-67)   SpO2:  [97 %]     Current BMI: Body mass index is 26.17 kg/m².    PEx  Constitutional: Patient appears debilitated.  No distress noted  Cardiovascular: Normal rate, regular rhythm and normal heart sounds.    Pulmonary/Chest: Effort normal and breath sounds are clear  Abdominal: Soft. Bowel sounds are normal.   Musculoskeletal: Normal range of motion.   Neurological: Alert and oriented to person, place, and time.   Psychiatric: Normal mood and affect. Behavior is normal.   Skin: Skin is warm and dry.     No results for input(s): "GLUCOSE", "NA", "K", "CL", "CO2", "BUN", "CREATININE", "CALCIUM", "MG" in the last 24 hours.        No results for input(s): "WBC", "RBC", "HGB", "HCT", "PLT", "MCV", "MCH", "MCHC" in the last 24 hours.        No results for input(s): "POCTGLUCOSE" in the last 168 hours.     Assessment and Plan:    L superior pubic rami fracture  Left inferior pubic rami fracture   s/p ground level fall   - CT scan (12/28) Mildly displaced fractures of the left superior and inferior pubic rami. Suspected nondisplaced S5 anterior cortex fracture. Small amount of hemorrhage in the left " "anterior pelvis.  - Ortho consulted - "No operative intervention necessary"  - Continue PT/OT, WBAT  - Improved, continue tramadol 100 mg daily, tramadol 50 q.6 hours PRN, acetaminophen PRN  - follow-up with orthopedics as outpatient, scheduled for 1/14 with x-rays     Hypertension  - Home regimen with  Carvedilol 3.125 mg BID, HCTZ 12.5 mg, Irbesartan 300 mg daily  - was bradycardic in acute setting, carvedilol and HCTZ were discontinued but then ordered on discharge orders  - discontinue Coreg and HCTZ, continue losartan mg daily in place of non formulary irbesartan  - 1/13 BP remains soft, continue to hold losartan    CKD stage 3  - follows as outpatient with Nephrology, Dr. Celestin,  last seen on 06/10/2024  - sCr baseline is normal, BUN mildly elevated chronically  - stable, BUN improved today, continue monitor twice weekly BMPs, avoid nephrotoxic agents, renally dose medications as appropriate      Normocytic anemia   - Ferritin- 383, iron- 55, B12- 220, folate 8.6  - transfuse for hemoglobin < 7  - stable, continue monitor twice weekly CBC    Transaminitis  - hold atorvastatin  - improved, continue monitor twice weekly CMPs     Hyperlipidemia  - continue to hold atorvastatin 40 mg qHS    Secondary hyperparathyroidism of renal origin   - follows as outpatient with Nephrology    Debility   - Continue with PT/OT for gait training and strengthening and restoration of ADL's   - Encourage mobility, OOB in chair, and early ambulation as appropriate  - Fall precautions   - Monitor for bowel and bladder dysfunction  - Monitor for and prevent skin breakdown and pressure ulcers  - continue DVT prophylaxis with Lovenox 40 mg daily      Anticipate disposition:  Home with home health    IP [facility] RISK OF UNPLANNED READMISSION Model: MODERATE     Follow-up needed during SNF stay-orthopedics (1/14)- will need to go to clinic    Follow-up needed after discharge from SNF: PCP     Future Appointments   Date Time Provider " Department Center   2/18/2025 10:45 AM Manny Rodriguez MD [id number] [place] [facility]   3/10/2025 11:00 AM Poonam Alexis MD [facility]   4/14/2025 11:00 AM Fernando Lamas MD [facility]     I certify that SNF services are required to be given on an inpatient basis because Taiwo Boyd needs for skilled nursing care and/or skilled rehabilitation are required on a daily basis and such services can only practically be provided in a skilled nursing facility setting and are for an ongoing condition for which she received inpatient care in the hospital.     Lorri Bowie NP  Department of Hospital Medicine   [facility]     DOS: 1/14/2025       Patient note was created using MModal Dictation.  Any errors in syntax or even information may not have been identified and edited on initial review prior to signing this note.

## 2025-01-15 NOTE — PLAN OF CARE
Problem: Adult Inpatient Plan of Care  Goal: Plan of Care Review  Outcome: Not Progressing     Problem: Fall Injury Risk  Goal: Absence of Fall and Fall-Related Injury  Outcome: Not Progressing     Problem: Skin Injury Risk Increased  Goal: Skin Health and Integrity  Outcome: Not Progressing

## 2025-01-15 NOTE — PROGRESS NOTES
[facility]                                  Skilled Nursing Facility                   Progress Note     Admit Date: 1/3/2025  PAWAN 1/21/2025  [id number] A  Principal Problem:  Fracture of ramus of left pubis   HPI obtained from patient interview and chart review     Chief Complaint: Re-evaluation of medical treatment and therapy status,  hypotension, constipation    HPI:   Taiwo Boyd is a 89 y.o.female with a PMHx of Anemia, CKD stage 3, HTN, HLD, and Secondary hyperparathyroidism who presents to SNF following hospital admission for mildly displaced left superior and inferior pubic rami fractures being treated with conservative management.  Admission to SNF for secondary weakness and debility.    Interval history:  24 hr vital sign ranges reviewed and listed below.  BP with improvement.  Patient went to orthopedic follow-up yesterday, note reviewed, continue with nonoperative management, continue PT/OT WBAT, follow-up in 4-6 weeks with new x-ray.  Patient states pain is well controlled.  Last bowel movement was 2 days ago, patient feels she needs something else to help her go, adding PRN MiraLax and suppository.  Patient denies shortness of breath, abdominal discomfort, nausea, or vomiting.  Patient reports an adequate appetite.  Patient denies dysuria.  Patient progressing with PT/OT- patient ambulated with RW SBA/S 140 ft no LOB step to gait pattern occ slight step through. Continuing to follow and treat all acute and chronic conditions.    Past Medical History: Patient has a past medical history of Anemia in stage 3 chronic kidney disease (4/19/2018), CKD (chronic kidney disease) stage 3, GFR 30-59 ml/min, Disorder of kidney and ureter, Hypertension, Hyponatremia, and Secondary hyperparathyroidism.    Past Surgical History: Patient has a past surgical history that includes Hysterectomy; Tonsillectomy; and Appendectomy.    Social  "History: Patient reports that she has never smoked. She has never used smokeless tobacco. She reports that she does not drink alcohol and does not use drugs.    Family History: Family history is unknown by patient.    Allergies: Patient is allergic to codeine phosphate.    ROS  Constitutional: Negative for fever   Eyes: Negative for blurred vision, double vision   Respiratory: Negative for cough, shortness of breath   Cardiovascular: Negative for chest pain, palpitations, and leg swelling.   Gastrointestinal: Negative for abdominal pain, constipation, diarrhea, nausea, vomiting.   Genitourinary: Negative for dysuria, frequency   Musculoskeletal:  + generalized weakness.  +mild intermittent hip pain  Skin: Negative for itching and rash.   Neurological: Negative for dizziness, headaches.   Psychiatric/Behavioral: Negative for depression. The patient is not nervous/anxious.      24 hour Vital Sign Range   Temp:  [97.9 °F (36.6 °C)-98 °F (36.7 °C)]   Pulse:  [55-69]   Resp:  [16-18]   BP: (126-147)/(58-67)   SpO2:  [97 %]     Current BMI: Body mass index is 26.17 kg/m².    PEx  Constitutional: Patient appears debilitated.  No distress noted  Cardiovascular: Normal rate, regular rhythm and normal heart sounds.    Pulmonary/Chest: Effort normal and breath sounds are clear  Abdominal: Soft. Bowel sounds are normal.   Musculoskeletal: Normal range of motion.   Neurological: Alert and oriented to person, place, and time.   Psychiatric: Normal mood and affect. Behavior is normal.   Skin: Skin is warm and dry.     No results for input(s): "GLUCOSE", "NA", "K", "CL", "CO2", "BUN", "CREATININE", "CALCIUM", "MG" in the last 24 hours.        No results for input(s): "WBC", "RBC", "HGB", "HCT", "PLT", "MCV", "MCH", "MCHC" in the last 24 hours.        No results for input(s): "POCTGLUCOSE" in the last 168 hours.     Assessment and Plan:    L superior pubic rami fracture  Left inferior pubic rami fracture   s/p ground level fall   - " "CT scan (12/28) Mildly displaced fractures of the left superior and inferior pubic rami. Suspected nondisplaced S5 anterior cortex fracture. Small amount of hemorrhage in the left anterior pelvis.  - Ortho consulted - "No operative intervention necessary"  - Continue PT/OT, WBAT  - Improved, continue tramadol 100 mg daily, tramadol 50 q.6 hours PRN, acetaminophen PRN  - 1/14 orthopedic follow-up completed, continue with nonoperative management, continue PT/OT WBAT, follow-up in 4-6 weeks with new x-ray.   - follow-up with orthopedics as outpatient, in 4-6 weeks     Hypertension  - Home regimen with  Carvedilol 3.125 mg BID, HCTZ 12.5 mg, Irbesartan 300 mg daily  - was bradycardic in acute setting, carvedilol and HCTZ were discontinued but then ordered on discharge orders  - discontinue Coreg and HCTZ, continue losartan mg daily in place of non formulary irbesartan  - 1/13 BP remains soft, continue to hold losartan    CKD stage 3  - follows as outpatient with Nephrology, Dr. Celestin,  last seen on 06/10/2024  - sCr baseline is normal, BUN mildly elevated chronically  - stable, BUN improved today, continue monitor twice weekly BMPs, avoid nephrotoxic agents, renally dose medications as appropriate      Normocytic anemia   - Ferritin- 383, iron- 55, B12- 220, folate 8.6  - transfuse for hemoglobin < 7  - stable, continue monitor twice weekly CBC    Transaminitis  - hold atorvastatin  - improved, continue monitor twice weekly CMPs     Hyperlipidemia  - continue to hold atorvastatin 40 mg qHS    Secondary hyperparathyroidism of renal origin   - follows as outpatient with Nephrology    Debility   - Continue with PT/OT for gait training and strengthening and restoration of ADL's   - Encourage mobility, OOB in chair, and early ambulation as appropriate  - Fall precautions   - Monitor for bowel and bladder dysfunction  - Monitor for and prevent skin breakdown and pressure ulcers  - continue DVT prophylaxis with Lovenox 40 " mg daily      Anticipate disposition:  Home with home health    IP [facility] RISK OF UNPLANNED READMISSION Model: MODERATE     Follow-up needed during SNF stay-orthopedics (1/14)- will need to go to clinic    Follow-up needed after discharge from SNF: PCP     Future Appointments   Date Time Provider Department Center   2/18/2025 10:45 AM Manny Rodriguez MD [id number]   3/10/2025 11:00 AM Poonam Alexis MD [facility]   4/14/2025 11:00 AM Fernando Lamas MD [facility]     I certify that SNF services are required to be given on an inpatient basis because Taiwo Boyd needs for skilled nursing care and/or skilled rehabilitation are required on a daily basis and such services can only practically be provided in a skilled nursing facility setting and are for an ongoing condition for which she received inpatient care in the hospital.     I spent 45 minutes reviewing patient records, examining, and counseling the patient/ patient's family with greater than 50% of the time spent in direct patient care and coordination.  care coordination with staff      Lorri Bowie NP  Department of Hospital Medicine   [facility]     DOS: 1/15/2025       Patient note was created using MModal Dictation.  Any errors in syntax or even information may not have been identified and edited on initial review prior to signing this note.

## 2025-01-15 NOTE — PT/OT/SLP PROGRESS
Physical Therapy Treatment    Patient Name:  Taiwo Boyd   MRN:  [id number]  Admit Date: 1/3/2025  Admitting Diagnosis: Fracture of ramus of left pubis  Recent Surgeries: N/A    General Precautions: Standard, fall  Orthopedic Precautions: LLE weight bearing as tolerated, RLE weight bearing as tolerated  Braces: N/A    Recommendations:     Discharge Recommendations: home health PT  Level of Assistance Recommended at Discharge: Intermittent assistance   Discharge Equipment Recommendations: wheelchair, walker, rolling, bath bench  Barriers to discharge: Decreased caregiver support    Assessment:     Taiwo Boyd is a 89 y.o. female admitted with a medical diagnosis of Fracture of ramus of left pubis . Pt with improved functional mvt since last week. Pt currently is (S)  for GT, SBA for curb step and to negotiate 4 steps with B rails, and Elayne for w/c mobility. Pt will have limited assistance at home and therefore pt would benefit from continued SNF rehab to help pt become more Mod I.    Performance deficits affecting function: weakness, impaired endurance, impaired self care skills, impaired functional mobility, gait instability, impaired balance, decreased upper extremity function, decreased lower extremity function, impaired cardiopulmonary response to activity, orthopedic precautions, decreased ROM.    Rehab Potential is good    Activity Tolerance: Good    Plan:     Patient to be seen 5 x/week to address the above listed problems via gait training, therapeutic activities, therapeutic exercises, neuromuscular re-education, wheelchair management/training    Plan of Care Expires: 02/03/25  Plan of Care Reviewed with: patient    Subjective     Pt agreeable to work with PT.     Pain/Comfort:  Pain Rating 1: 0/10  Pain Rating Post-Intervention 1: 0/10    Patient's cultural, spiritual, [employer]ec[employer]alism conflicts given the current situation:  no    Objective:     Communicated with pt prior to session.  Patient found up in chair  "with   upon PT entry to room.     Therapeutic Activities and Exercises: Nustep with resistance set at 5 for 15mins to improve B l/E ROM, overall MMT and cardiovascular endurance.  SBA w/c <>Nustep seat with no AD, SPT    Functional Mobility:  Transfers:     Sit to Stand:  supervision with rolling walker  Bed to Chair: supervision with  rolling walker  using  Stand Pivot  Gait: 115ft x 2 trials with RW and (S) for safety. Pt with slower jacky d.t step to GT pattern.  Stairs:  Pt ascended/descended 4 stair(s) and 4" curb step with Rolling Walker with bilateral handrails with Stand-by Assistance.   Wheelchair Propulsion:  Pt propelled Light weight wheelchair x 150 feet on Level tile with  Bilateral upper extremity with Minimal Assistance.     AM-PAC 6 CLICK MOBILITY  18    Patient left up in chair with call button in reach.    GOALS:   Multidisciplinary Problems       Physical Therapy Goals          Problem: Physical Therapy    Goal Priority Disciplines Outcome Interventions   Physical Therapy Goal     PT, PT/OT Progressing    Description: Goals to be met by: 2/4/25     Patient will increase functional independence with mobility by performing:    . Supine to sit with [place]  . Sit to supine with [place]  . Rolling to Left and Right with [place].  . Sit to stand transfer with [place]  . Bed to chair transfer with Modified [place] using LRAD  . Gait  x 150 feet with Supervision using LRAD  . Wheelchair propulsion x150 feet with Supervision using bilateral uppper extremities  . Ascend/descend 4 stair with bilateral Handrails Stand-by Assistance using No Assistive Device.   . Ascend/Descend 4 inch curb step with Stand-by Assistance using Rolling Walker.    Rehab Services' DME recommendations    Walker Aubrey (4'4"-5'2")    Accessories N/A  Wheels Yes       Wheelchair  Number of hours up in a wheelchair per day >8hrs/day      Style Light weight    Justification for light weight w/c: patient can " and does self-propel in a lightweight wheelchair, patient has impaired ability to participate in MRADLs, mobility limitations cannot be sifficiently resolved with a cane/walker, the home provides adequate access between rooms for a wheelchair, the patient is willing to use a wheelchair in the home, the patient has a caregiver who is available, willing, and able to provide assistance with the wheelchair, and the patient requires additional person for safety with mobility with walker  Seat Width 18 (Standard adult)  Seat Depth 18  Back Height Standard  Leg Support Standard and Swing Away  Arm Height Full, Swing Away, and Adjustable Height  Lap Belt N/A  Accessories Front Brakes and Anti-tippers  Cushion Basic  Justification for Cushion To prevent sacral wounds    Tub bench Standard (unpadded)          Bria Mata, PT 1/15/2025                            Time Tracking:     PT Received On: 01/15/25  PT Start Time: 0835  PT Stop Time: 0913  PT Total Time (min): 38 min    Billable Minutes: Gait Training 23 and Therapeutic Exercise 15    Treatment Type: Treatment  PT/PTA: PT     Number of PTA visits since last PT visit: 0     01/15/2025

## 2025-01-15 NOTE — PLAN OF CARE
"  Problem: Physical Therapy  Goal: Physical Therapy Goal  Description: Goals to be met by: 2/4/25     Patient will increase functional independence with mobility by performing:    . Supine to sit with [place]  . Sit to supine with [place]  . Rolling to Left and Right with [place].  . Sit to stand transfer with [place]  . Bed to chair transfer with Modified [place] using LRAD  . Gait  x 150 feet with Supervision using LRAD  . Wheelchair propulsion x150 feet with Supervision using bilateral uppper extremities  . Ascend/descend 4 stair with bilateral Handrails Stand-by Assistance using No Assistive Device.   . Ascend/Descend 4 inch curb step with Stand-by Assistance using Rolling Walker.    Rehab Services' DME recommendations    Walker Aubrey (4'4"-5'2")    Accessories N/A  Wheels Yes       Wheelchair  Number of hours up in a wheelchair per day >8hrs/day      Style Light weight    Justification for light weight w/c: patient can and does self-propel in a lightweight wheelchair, patient has impaired ability to participate in MRADLs, mobility limitations cannot be sifficiently resolved with a cane/walker, the home provides adequate access between rooms for a wheelchair, the patient is willing to use a wheelchair in the home, the patient has a caregiver who is available, willing, and able to provide assistance with the wheelchair, and the patient requires additional person for safety with mobility with walker  Seat Width 18 (Standard adult)  Seat Depth 18  Back Height Standard  Leg Support Standard and Swing Away  Arm Height Full, Swing Away, and Adjustable Height  Lap Belt N/A  Accessories Front Brakes and Anti-tippers  Cushion Basic  Justification for Cushion To prevent sacral wounds    Tub bench Standard (unpadded)          Bria Mata, PT 1/15/2025       Outcome: Progressing     "

## 2025-01-15 NOTE — PT/OT/SLP PROGRESS
"Occupational Therapy   Treatment    Name: Taiwo Boyd  MRN: [id number]  Admit Date: 1/3/2025  Admitting Diagnosis:  Fracture of ramus of left pubis    General Precautions: Standard, fall   Orthopedic Precautions: LLE weight bearing as tolerated   Braces: N/A    Recommendations:     Discharge Recommendations:  home with home health  Level of Assistance Recommended at Discharge: 24 hours light assistance for ADL's and homemaking tasks  Discharge Equipment Recommendations: 3-in-1 commode, bath bench, hip kit  Barriers to discharge:  Decreased caregiver support    Assessment:     Taiwo Boyd is a 89 y.o. female with a medical diagnosis of Fracture of ramus of left pubis .  She presents with pain, decreased standing balance and tolerance, and limited participation in ADLs and functional mobility tasks. Pt with good tolerance to session on this date and able to stand for 8.5 minutes with no UE support. Pt pleasant and motivated to return to PLOF. Pt would benefit from additional skilled OT services to increase I with ADLs and IADLs and maximize functional performance to ensure a safe D/C. Performance deficits affecting function are weakness, impaired endurance, impaired self care skills, impaired functional mobility, gait instability, impaired balance, decreased upper extremity function, decreased lower extremity function, impaired cardiopulmonary response to activity, orthopedic precautions.     Rehab Potential is good    Activity tolerance:  Fair    Plan:     Patient to be seen 5 x/week to address the above listed problems via self-care/home management, therapeutic activities, therapeutic exercises    Plan of Care Expires: 01/21/25  Plan of Care Reviewed with: patient    Subjective     Communicated with: JOHN prior to session. "The pain is minimal today." .    Pain/Comfort:  Pain Rating 1: 2/10  Location - Side 1: Left  Location - Orientation 1: generalized  Location 1: leg  Pain Addressed 1: Pre-medicate for " activity, Reposition, Distraction, Cessation of Activity    Patient's cultural, spiritual, Congregational conflicts given the current situation:  no    Objective:     Patient found up in chair with  (no active lines) upon OT entry to room.      Functional Mobility/Transfers:  Patient completed Sit <> Stand Transfer with stand by assistance  with  rolling walker   Patient completed Bed <> Chair Transfer using Step Transfer technique with stand by assistance with rolling walker. X2 trials.   Functional Mobility: Pt self propelled W/C from gym to room and from room to gym with SPV.     [facility] 6 Click ADL: 22    OT Exercises: UE Ergometer for 12 minutes with mod res to focus on increasing BUE strength and endurance needed for IADL and ADL completion.       Pt  completed dynamic standing task at  counter with RW to improve standing tolerance, balance, visual scanning, functional reaching, and overall activity tolerance.   Pt used BUE to select small pegs and copy pattern into peg board x2 trials. Pt tolerated standing for 8.5 minutes with SBA and no LOB    Treatment & Education:  Role of OT  -OT Poc  -safety awareness and precautions  -Level of A required for ADLs and functional mobility  -AE used for ADL completion  -importance of OOB activity and energy conservation      Patient left up in chair with all lines intact and call button in reach    GOALS:   Multidisciplinary Problems       Occupational Therapy Goals          Problem: Occupational Therapy    Goal Priority Disciplines Outcome Interventions   Occupational Therapy Goal     OT, PT/OT Progressing    Description: Goals to be met by: 2/3/2025     Patient will increase functional independence with ADLs by performing:    UE Dressing with Set up A- Met  ~UE dressing /c Mod I- Met  LE Dressing with Set-up Assistance- Ongoing  Grooming while seated at sink with Modified [place]- Met  Toileting from toilet with Mod I for hygiene and clothing management- Met  Bathing from   shower chair/bench with Supervision- Met  Toilet transfer to toilet with Modified [place]- Ongoing  Pt will tolerate standing for 10 minutes while completing dynamic task with SPV using LRAD- Ongoing  FWM with mod I using AE as needed- Met                          Time Tracking:     OT Date of Treatment: 01/15/25  OT Start Time: 0950    OT Stop Time: 1031  OT Total Time (min): 41 min    Billable Minutes:Therapeutic Activity 23 minutes  Therapeutic Exercise 18 minutes    1/15/2025

## 2025-01-15 NOTE — PLAN OF CARE
[facility] - Skilled Nursing      HOME HEALTH ORDERS  FACE TO FACE ENCOUNTER    Patient Name: Taiwo Boyd  YOB: 1935    PCP: Fernando Lamas MD   PCP Address: [address] / [place] [address]  PCP Phone Number: [phone]  PCP Fax: [phone]    Encounter Date: 1/3/25    Admit to Home Health    Diagnoses:  Active Hospital Problems    Diagnosis  POA    *Fracture of ramus of left pubis [S32.592A]  Yes    Primary hypertension [I10]  Yes    Essential hypertension [I10]  Yes     Chronic    Secondary hyperparathyroidism of renal origin [N25.81]  Yes    High cholesterol [E78.00]  Yes     Chronic    Vitamin D deficiency [E55.9]  Yes    Anemia due to stage 3a chronic kidney disease [N18.31, D63.1]  Yes      Resolved Hospital Problems   No resolved problems to display.       Follow Up Appointments:  Future Appointments   Date Time Provider Department Center   2/18/2025 10:45 AM Manny Rodriguez MD [id number] [place] [facility]   3/10/2025 11:00 AM Poonam Alexis MD [facility]   4/14/2025 11:00 AM Fernando Lamas MD [facility]       Allergies:  Review of patient's allergies indicates:   Allergen Reactions    Codeine phosphate Rash and Other (See Comments)       Medications: Review discharge medications with patient and family and provide education.      I have seen and examined this patient within the last 30 days. My clinical findings that support the need for the home health skilled services and home bound status are the following:no   Weakness/numbness causing balance and gait disturbance due to Fracture making it taxing to leave home.     Referrals/ Consults  Physical Therapy to evaluate and treat. Evaluate for home safety and equipment needs; Establish/upgrade home exercise program. Perform / instruct on therapeutic exercises, gait training, transfer training, and Range of Motion.  Occupational Therapy to evaluate and treat. Evaluate home environment  for safety and equipment needs. Perform/Instruct on transfers, ADL training, ROM, and therapeutic exercises.  Aide to provide assistance with personal care, ADLs, and vital signs.    Activities:   activity as tolerated; WBAT    Nursing:   Agency to admit patient within 24 hours of hospital discharge unless specified on physician order or at patient request    SN to complete comprehensive assessment including routine vital signs. Instruct on disease process and s/s of complications to report to MD. Review/verify medication list sent home with the patient at time of discharge  and instruct patient/caregiver as needed. Frequency may be adjusted depending on start of care date.     Skilled nurse to perform up to 3 visits PRN for symptoms related to diagnosis    Notify MD if SBP > 160 or < 90; DBP > 90 or < 50; HR > 120 or < 50; Temp > 101; O2 < 88%    Ok to schedule additional visits based on staff availability and patient request on consecutive days within the home health episode.    Home Health Aide:  Nursing Three times weekly, Physical Therapy Three times weekly, Occupational Therapy Three times weekly, and Home Health Aide Three times weekly    Wound Care Orders  no    I certify that this patient is confined to her home and needs intermittent skilled nursing care, physical therapy, and occupational therapy.

## 2025-01-16 LAB
ALBUMIN SERPL BCP-MCNC: 3 G/DL (ref 3.5–5.2)
ALP SERPL-CCNC: 175 U/L (ref 40–150)
ALT SERPL W/O P-5'-P-CCNC: 37 U/L (ref 10–44)
ANION GAP SERPL CALC-SCNC: 6 MMOL/L (ref 8–16)
AST SERPL-CCNC: 16 U/L (ref 10–40)
BASOPHILS # BLD AUTO: 0.03 K/UL (ref 0–0.2)
BASOPHILS NFR BLD: 0.6 % (ref 0–1.9)
BILIRUB SERPL-MCNC: 0.4 MG/DL (ref 0.1–1)
BUN SERPL-MCNC: 37 MG/DL (ref 8–23)
CALCIUM SERPL-MCNC: 9 MG/DL (ref 8.7–10.5)
CHLORIDE SERPL-SCNC: 107 MMOL/L (ref 95–110)
CO2 SERPL-SCNC: 23 MMOL/L (ref 23–29)
CREAT SERPL-MCNC: 0.7 MG/DL (ref 0.5–1.4)
DIFFERENTIAL METHOD BLD: ABNORMAL
EOSINOPHIL # BLD AUTO: 0.3 K/UL (ref 0–0.5)
EOSINOPHIL NFR BLD: 5.6 % (ref 0–8)
ERYTHROCYTE [DISTWIDTH] IN BLOOD BY AUTOMATED COUNT: 14.1 % (ref 11.5–14.5)
EST. GFR  (NO RACE VARIABLE): >60 ML/MIN/1.73 M^2
GLUCOSE SERPL-MCNC: 85 MG/DL (ref 70–110)
HCT VFR BLD AUTO: 26.8 % (ref 37–48.5)
HGB BLD-MCNC: 9.2 G/DL (ref 12–16)
IMM GRANULOCYTES # BLD AUTO: 0.01 K/UL (ref 0–0.04)
IMM GRANULOCYTES NFR BLD AUTO: 0.2 % (ref 0–0.5)
LYMPHOCYTES # BLD AUTO: 1.5 K/UL (ref 1–4.8)
LYMPHOCYTES NFR BLD: 32.9 % (ref 18–48)
MAGNESIUM SERPL-MCNC: 2.1 MG/DL (ref 1.6–2.6)
MCH RBC QN AUTO: 34.5 PG (ref 27–31)
MCHC RBC AUTO-ENTMCNC: 34.3 G/DL (ref 32–36)
MCV RBC AUTO: 100 FL (ref 82–98)
MONOCYTES # BLD AUTO: 0.5 K/UL (ref 0.3–1)
MONOCYTES NFR BLD: 10.6 % (ref 4–15)
NEUTROPHILS # BLD AUTO: 2.3 K/UL (ref 1.8–7.7)
NEUTROPHILS NFR BLD: 50.1 % (ref 38–73)
NRBC BLD-RTO: 0 /100 WBC
PHOSPHATE SERPL-MCNC: 3.3 MG/DL (ref 2.7–4.5)
PLATELET # BLD AUTO: 258 K/UL (ref 150–450)
PMV BLD AUTO: 10.9 FL (ref 9.2–12.9)
POTASSIUM SERPL-SCNC: 4.3 MMOL/L (ref 3.5–5.1)
PROT SERPL-MCNC: 5.7 G/DL (ref 6–8.4)
RBC # BLD AUTO: 2.67 M/UL (ref 4–5.4)
SODIUM SERPL-SCNC: 136 MMOL/L (ref 136–145)
WBC # BLD AUTO: 4.62 K/UL (ref 3.9–12.7)

## 2025-01-16 PROCEDURE — 97110 THERAPEUTIC EXERCISES: CPT | Mod: CO

## 2025-01-16 PROCEDURE — 97110 THERAPEUTIC EXERCISES: CPT | Mod: CQ

## 2025-01-16 PROCEDURE — 97116 GAIT TRAINING THERAPY: CPT | Mod: CQ

## 2025-01-16 PROCEDURE — 97535 SELF CARE MNGMENT TRAINING: CPT | Mod: CO

## 2025-01-16 PROCEDURE — 84100 ASSAY OF PHOSPHORUS: CPT | Performed by: HOSPITALIST

## 2025-01-16 PROCEDURE — 97530 THERAPEUTIC ACTIVITIES: CPT | Mod: CO

## 2025-01-16 PROCEDURE — 85025 COMPLETE CBC W/AUTO DIFF WBC: CPT | Performed by: HOSPITALIST

## 2025-01-16 PROCEDURE — 63600175 PHARM REV CODE 636 W HCPCS: Performed by: NURSE PRACTITIONER

## 2025-01-16 PROCEDURE — 80053 COMPREHEN METABOLIC PANEL: CPT | Performed by: HOSPITALIST

## 2025-01-16 PROCEDURE — 25000003 PHARM REV CODE 250: Performed by: HOSPITALIST

## 2025-01-16 PROCEDURE — 25000003 PHARM REV CODE 250: Performed by: NURSE PRACTITIONER

## 2025-01-16 PROCEDURE — 97530 THERAPEUTIC ACTIVITIES: CPT | Mod: CQ

## 2025-01-16 PROCEDURE — 83735 ASSAY OF MAGNESIUM: CPT | Performed by: HOSPITALIST

## 2025-01-16 PROCEDURE — 11000004 HC SNF PRIVATE

## 2025-01-16 PROCEDURE — 36415 COLL VENOUS BLD VENIPUNCTURE: CPT | Performed by: HOSPITALIST

## 2025-01-16 RX ORDER — AMOXICILLIN 250 MG
1 CAPSULE ORAL 2 TIMES DAILY PRN
COMMUNITY
Start: 2025-01-16

## 2025-01-16 RX ORDER — TRAMADOL HYDROCHLORIDE 100 MG/1
100 TABLET, COATED ORAL EVERY 8 HOURS PRN
Qty: 20 TABLET | Refills: 0 | Status: SHIPPED | OUTPATIENT
Start: 2025-01-16

## 2025-01-16 RX ORDER — POLYETHYLENE GLYCOL 3350 17 G/17G
17 POWDER, FOR SOLUTION ORAL 2 TIMES DAILY PRN
COMMUNITY
Start: 2025-01-16

## 2025-01-16 RX ORDER — TALC
6 POWDER (GRAM) TOPICAL NIGHTLY PRN
Status: CANCELLED | COMMUNITY
Start: 2025-01-16

## 2025-01-16 RX ADMIN — SENNOSIDES AND DOCUSATE SODIUM 1 TABLET: 50; 8.6 TABLET ORAL at 08:01

## 2025-01-16 RX ADMIN — SENNOSIDES AND DOCUSATE SODIUM 1 TABLET: 50; 8.6 TABLET ORAL at 07:01

## 2025-01-16 RX ADMIN — FERROUS SULFATE TAB 325 MG (65 MG ELEMENTAL FE) 1 EACH: 325 (65 FE) TAB at 07:01

## 2025-01-16 RX ADMIN — ENOXAPARIN SODIUM 40 MG: 40 INJECTION SUBCUTANEOUS at 05:01

## 2025-01-16 RX ADMIN — Medication 6 MG: at 08:01

## 2025-01-16 RX ADMIN — TRAMADOL HYDROCHLORIDE 100 MG: 50 TABLET, COATED ORAL at 07:01

## 2025-01-16 NOTE — PLAN OF CARE
Patient will discharge tomorrw 1/17. Daughter will pick her up for 6pm. DME WC,RW and tub bench sent to [employer]. Home Health sent to [facility] per patient /daughter's request.

## 2025-01-16 NOTE — PROGRESS NOTES
[facility]                                  Skilled Nursing Facility                   Progress Note     Admit Date: 1/3/2025  PAWAN 1/17/2025  [id number] A  Principal Problem:  Fracture of ramus of left pubis   HPI obtained from patient interview and chart review     Chief Complaint: Re-evaluation of medical treatment and therapy status, lab review     HPI:   Taiwo Boyd is a 89 y.o.female with a PMHx of Anemia, CKD stage 3, HTN, HLD, and Secondary hyperparathyroidism who presents to SNF following hospital admission for mildly displaced left superior and inferior pubic rami fractures being treated with conservative management.  Admission to SNF for secondary weakness and debility.    Interval history: All of today's labs reviewed and are listed below.  24 hr vital sign ranges reviewed and listed below.  Patient denies shortness of breath, abdominal discomfort, nausea, or vomiting.  Patient reports an adequate appetite.  Patient denies dysuria.  Patient reports having regular bowel movements.  Patient progressing with PT/OT- patient ambulated with RW S step to gait pattern ~150 ft no LOB, inc BUE support on RW to off load LEs with step to gait patter, mild antalgic gait noted. Continuing to follow and treat all acute and chronic conditions.    Past Medical History: Patient has a past medical history of Anemia in stage 3 chronic kidney disease (4/19/2018), CKD (chronic kidney disease) stage 3, GFR 30-59 ml/min, Disorder of kidney and ureter, Hypertension, Hyponatremia, and Secondary hyperparathyroidism.    Past Surgical History: Patient has a past surgical history that includes Hysterectomy; Tonsillectomy; and Appendectomy.    Social History: Patient reports that she has never smoked. She has never used smokeless tobacco. She reports that she does not drink alcohol and does not use drugs.    Family History: Family history is unknown by  "patient.    Allergies: Patient is allergic to codeine phosphate.    ROS  Constitutional: Negative for fever   Eyes: Negative for blurred vision, double vision   Respiratory: Negative for cough, shortness of breath   Cardiovascular: Negative for chest pain, palpitations, and leg swelling.   Gastrointestinal: Negative for abdominal pain, constipation, diarrhea, nausea, vomiting.   Genitourinary: Negative for dysuria, frequency   Musculoskeletal:  + generalized weakness.  +mild intermittent hip pain  Skin: Negative for itching and rash.   Neurological: Negative for dizziness, headaches.   Psychiatric/Behavioral: Negative for depression. The patient is not nervous/anxious.      24 hour Vital Sign Range   Temp:  [97.8 °F (36.6 °C)-97.9 °F (36.6 °C)]   Pulse:  [65-74]   Resp:  [18]   BP: (123-130)/(60-68)   SpO2:  [96 %]     Current BMI: Body mass index is 26.17 kg/m².    PEx  Constitutional: Patient appears debilitated.  No distress noted  Cardiovascular: Normal rate, regular rhythm and normal heart sounds.    Pulmonary/Chest: Effort normal and breath sounds are clear  Abdominal: Soft. Bowel sounds are normal.   Musculoskeletal: Normal range of motion.   Neurological: Alert and oriented to person, place, and time.   Psychiatric: Normal mood and affect. Behavior is normal.   Skin: Skin is warm and dry.     Recent Labs   Lab 01/16/25  0445      K 4.3      CO2 23   BUN 37*   CREATININE 0.7   CALCIUM 9.0   MG 2.1           Recent Labs   Lab 01/16/25  0445   WBC 4.62   RBC 2.67*   HGB 9.2*   HCT 26.8*      *   MCH 34.5*   MCHC 34.3           No results for input(s): "POCTGLUCOSE" in the last 168 hours.     Assessment and Plan:    L superior pubic rami fracture  Left inferior pubic rami fracture   s/p ground level fall   - CT scan (12/28) Mildly displaced fractures of the left superior and inferior pubic rami. Suspected nondisplaced S5 anterior cortex fracture. Small amount of hemorrhage in the left " "anterior pelvis.  - Ortho consulted - "No operative intervention necessary"  - Continue PT/OT, WBAT  - Improved, continue tramadol 100 mg daily, tramadol 50 q.6 hours PRN, acetaminophen PRN  - 1/14 orthopedic follow-up completed, continue with nonoperative management, continue PT/OT WBAT, follow-up in 4-6 weeks with new x-ray.   - follow-up with orthopedics as outpatient, in 4-6 weeks, set for 2/18     Hypertension  - Home regimen with  Carvedilol 3.125 mg BID, HCTZ 12.5 mg, Irbesartan 300 mg daily  - was bradycardic in acute setting, carvedilol and HCTZ were discontinued but then ordered on discharge orders  - discontinue Coreg and HCTZ, continue losartan mg daily in place of non formulary irbesartan  - 1/16 BP with improvement will resume home medications upon discharge.    CKD stage 3  - follows as outpatient with Nephrology, Dr. Celestin,  last seen on 06/10/2024  - sCr baseline is normal, BUN mildly elevated chronically  - stable, BUN improved today, continue monitor twice weekly BMPs, avoid nephrotoxic agents, renally dose medications as appropriate      Normocytic anemia   - Ferritin- 383, iron- 55, B12- 220, folate 8.6  - transfuse for hemoglobin < 7  - stable, continue monitor twice weekly CBC    Transaminitis, resolved   - hold atorvastatin  - now WNL     Hyperlipidemia  - continue to hold atorvastatin 40 mg qHS    Secondary hyperparathyroidism of renal origin   - follows as outpatient with Nephrology    Debility   - Continue with PT/OT for gait training and strengthening and restoration of ADL's   - Encourage mobility, OOB in chair, and early ambulation as appropriate  - Fall precautions   - Monitor for bowel and bladder dysfunction  - Monitor for and prevent skin breakdown and pressure ulcers  - continue DVT prophylaxis with Lovenox 40 mg daily      Anticipate disposition:  Home with home health    IP [facility] RISK OF UNPLANNED READMISSION Model: MODERATE     Follow-up needed during SNF stay-orthopedics " (1/14)- will need to go to clinic    Follow-up needed after discharge from SNF: PCP     Future Appointments   Date Time Provider Department Center   2/18/2025 10:45 AM Manny Rodriguez MD [id number] [place] [facility]   3/10/2025 11:00 AM Poonam Alexis MD [facility]   4/14/2025 11:00 AM Fernando Lamas MD [facility]     I certify that SNF services are required to be given on an inpatient basis because Taiwo Boyd needs for skilled nursing care and/or skilled rehabilitation are required on a daily basis and such services can only practically be provided in a skilled nursing facility setting and are for an ongoing condition for which she received inpatient care in the hospital.     I spent 47 minutes reviewing patient records, examining, and counseling the patient/ patient's family with greater than 50% of the time spent in direct patient care and coordination.  Dc  coordination       Lorri Bowie NP  Department of Hospital Medicine   [facility]- Skilled Nursing Facility     DOS: 1/16/2025       Patient note was created using MModal Dictation.  Any errors in syntax or even information may not have been identified and edited on initial review prior to signing this note.

## 2025-01-16 NOTE — PT/OT/SLP PROGRESS
"Occupational Therapy   Treatment    Name: Taiwo Boyd  MRN: [id number]  Admit Date: 1/3/2025  Admitting Diagnosis:  Fracture of ramus of left pubis    General Precautions: Standard, fall   Orthopedic Precautions: LLE weight bearing as tolerated   Braces: N/A    Recommendations:     Discharge Recommendations:  home with home health  Level of Assistance Recommended at Discharge: 24 hours supervision for safety in performing ADL's and home management tasks  Discharge Equipment Recommendations: 3-in-1 commode, bath bench, hip kit  Barriers to discharge:  Decreased caregiver support    Assessment:     Taiwo Boyd is a 89 y.o. female with a medical diagnosis of Fracture of ramus of left pubis.  She presents with performance deficits affecting function are weakness, impaired endurance, impaired self care skills, impaired functional mobility, gait instability, impaired balance, decreased upper extremity function, decreased lower extremity function, impaired cardiopulmonary response to activity, orthopedic precautions. Pt with concerns about d/c, ask to speak with . Pt participated well during today's session. Pt tolerated tx session without incident and is making progress, however, continues to demonstrate deficits with self care skills, balance, functional mobility, UB strength and endurance. Pt will benefit from continued OT services to progress towards goals.     Rehab Potential is good    Activity tolerance:  Good    Plan:     Patient to be seen 5 x/week to address the above listed problems via self-care/home management, therapeutic activities, therapeutic exercises    Plan of Care Expires: 01/21/25  Plan of Care Reviewed with: patient    Subjective   "I'm going to leave tomorrow"  Communicated with: Cristina prior to session.     Pain/Comfort:       Patient's cultural, spiritual, Catholic conflicts given the current situation:  no    Objective:     Patient found  bedside chair  upon OT entry to " room.    Functional Mobility/Transfers:  Patient completed Sit <> Stand Transfer x 4 with supervision  with  rolling walker   Patient completed Bedside chair <> Wheelchair x 2 Transfer using Step Transfer technique with supervision with rolling walker  Patient completed Toilet Transfer Step Transfer technique with supervision with  rolling walker  Functional Mobility: Pt ambulated 24 ft in room with spv and RW.     Activities of Daily Living:  Grooming: supervision to perform hand hygiene in stance at sink with RW  Toileting: supervision to perform perineal hygiene and clothing management in stance with use of RW    [facility] 6 Click ADL:      OT Exercises: UE Ergometer 10 min with min resistance   Pt perform 2 x 10 of the following with yellow theraband:  - chest pulls  - shoulder flex/ext  - shoulder abd/add  - elbow flex/ext    Therex performed to improve overall endurance, ROM and UB strength required for functional transfers, ADL's and w/c propulsion.      Treatment & Education:  Pt with functional activity on today with SBA with no AD. Pt at raised counter to perform activity with focus on standing tolerance, dynamic standing bal, crossing midline, and to promote independence with homemaking and self care tasks.  Pt tolerate stance for - 6:49 min/sec.     Pt educated on:  - role of OT  - level of assistance  - energy conservation and task modification to maximize independence with ADL's and mobility   -  safety while performing functional transfers and self care tasks  - orthopedic precautions.   - progress towards OT goals     Patient left  bedside chair  with call button in reach    GOALS:   Multidisciplinary Problems       Occupational Therapy Goals       Not on file              Multidisciplinary Problems (Resolved)          Problem: Occupational Therapy    Goal Priority Disciplines Outcome Interventions   Occupational Therapy Goal   (Resolved)     OT, PT/OT Met    Description: Goals to be met by: 2/3/2025      Patient will increase functional independence with ADLs by performing:    UE Dressing with Set up A- Met  ~UE dressing /c Mod I- Met  LE Dressing with Set-up Assistance- Met  Grooming while seated at sink with Modified [place]- Met  Toileting from toilet with Mod I for hygiene and clothing management- Met  Bathing from  shower chair/bench with Supervision- Met  Toilet transfer to toilet with Modified [place]- Met  Pt will tolerate standing for 10 minutes while completing dynamic task with SPV using LRAD- Met  FWM with mod I using AE as needed- Met                          Time Tracking:     OT Date of Treatment: 01/16/25  OT Start Time: 1318    OT Stop Time: 1411  OT Total Time (min): 53 min    Billable Minutes:Self Care/Home Management 15  Therapeutic Activity 23  Therapeutic Exercise 15    1/17/2025

## 2025-01-16 NOTE — PLAN OF CARE
Spoke to patient concerning discharge plan. Would like to go home tomorrow 1/17/25  instead of Saturday 1/18/25. Discussed reason why discharge date was moved from 1/21. Explained that insurance and Medicare reviews chart and progress to determine the days that will be paid. Also explained the appeals process and she said she will not appeal she would like to go home tomorrow.

## 2025-01-16 NOTE — PLAN OF CARE
Chw served per [employer] NOMNC to patient. Patient signed NOMNC and a copy of NOMNC was given.

## 2025-01-16 NOTE — PT/OT/SLP PROGRESS
"Physical Therapy Treatment    Patient Name:  Taiwo Boyd   MRN:  [id number]  Admit Date: 1/3/2025  Admitting Diagnosis: Fracture of ramus of left pubis  Recent Surgeries:     General Precautions: Standard, fall  Orthopedic Precautions: LLE weight bearing as tolerated, RLE weight bearing as tolerated  Braces: N/A    Recommendations:     Discharge Recommendations: home health PT  Level of Assistance Recommended at Discharge: Intermittent assistance   Discharge Equipment Recommendations: wheelchair, walker, rolling, bath bench  Barriers to discharge: Decreased caregiver support    Assessment:     Taiwo Boyd is a 89 y.o. female admitted with a medical diagnosis of Fracture of ramus of left pubis . Pt tolerated well, pt would continue to benefit from skilled PT services to improve overall functional mobility, strength and endurance.  .      Performance deficits affecting function: weakness, impaired endurance, impaired self care skills, impaired functional mobility, gait instability, impaired balance, decreased upper extremity function, decreased lower extremity function, impaired cardiopulmonary response to activity, orthopedic precautions, decreased ROM.    Rehab Potential is good    Activity Tolerance: Fair    Plan:     Patient to be seen 5 x/week to address the above listed problems via gait training, therapeutic activities, therapeutic exercises, neuromuscular re-education, wheelchair management/training    Plan of Care Expires: 02/03/25  Plan of Care Reviewed with: patient    Subjective     "Not good, they making me leave" pt agreeable to therapy, requesting to speak with CM after session. Notified post session    Pain/Comfort:  Pain Rating 1: 0/10 (none reported however inc BUE support on RW with step to gait pattern to off load LEs, mild antalgic gait noted)  Location - Side 1: Left  Location - Orientation 1: generalized  Location 1: leg  Pain Rating Post-Intervention 1: 0/10    Patient's cultural, spiritual, " "Protestant conflicts given the current situation:  no    Objective:       Patient found  with  (in BSC) upon PT entry to room.     Therapeutic Activities and Exercises: recumbent cross  x 10 min L-5    Functional Mobility:  Transfers:     Sit to Stand:  modified independence and supervision with rolling walker  Bed to Chair: supervision with  no AD and rolling walker  using  Stand Pivot and and WC<>nustep S no AD  Gait: amb with RW S step to gait pattern ~150 ft no LOB, inc BUE support on RW to off load LEs with step to gait patter, mild antalgic gait noted  Balance: amb over 1 inch 10 ft balance foam mat with RW close SBA  Pt stood with RW SBA using reacher picking up 3 items off the floor  Stairs:  asc/feliz 4 steps with BHR SBA  Curb asc/feliz 4" curb with RW close SBA vcs for safety/tech,initially trying to step up before putting RW up  Wheelchair Propulsion: 50 ft with BUE S vcs for safety/tech    AM-PAC 6 CLICK MOBILITY  18    Patient left up in chair with call button in reach and belonging in reach CM notified .    GOALS:   Multidisciplinary Problems       Physical Therapy Goals          Problem: Physical Therapy    Goal Priority Disciplines Outcome Interventions   Physical Therapy Goal     PT, PT/OT Progressing    Description: Goals to be met by: 2/4/25     Patient will increase functional independence with mobility by performing:    . Supine to sit with [place]  . Sit to supine with [place]  . Rolling to Left and Right with [place].  . Sit to stand transfer with [place]  . Bed to chair transfer with Modified [place] using LRAD  . Gait  x 150 feet with Supervision using LRAD met  . Wheelchair propulsion x150 feet with Supervision using bilateral uppper extremities  . Ascend/descend 4 stair with bilateral Handrails Stand-by Assistance using No Assistive Device. met  . Ascend/Descend 4 inch curb step with Stand-by Assistance using Rolling Walker. met    Rehab Services' DME " "recommendations    Walker Aubrey (4'4"-5'2")    Accessories N/A  Wheels Yes       Wheelchair  Number of hours up in a wheelchair per day >8hrs/day      Style Light weight    Justification for light weight w/c: patient can and does self-propel in a lightweight wheelchair, patient has impaired ability to participate in MRADLs, mobility limitations cannot be sifficiently resolved with a cane/walker, the home provides adequate access between rooms for a wheelchair, the patient is willing to use a wheelchair in the home, the patient has a caregiver who is available, willing, and able to provide assistance with the wheelchair, and the patient requires additional person for safety with mobility with walker  Seat Width 18 (Standard adult)  Seat Depth 18  Back Height Standard  Leg Support Standard and Swing Away  Arm Height Full, Swing Away, and Adjustable Height  Lap Belt N/A  Accessories Front Brakes and Anti-tippers  Cushion Basic  Justification for Cushion To prevent sacral wounds    Tub bench Standard (unpadded)          Bria Mata, PT 1/15/2025                            Time Tracking:     PT Received On: 01/16/25  PT Start Time: 0838  PT Stop Time: 0925  PT Total Time (min): 47 min    Billable Minutes: Gait Training 15, Therapeutic Activity 22, and Therapeutic Exercise 10    Treatment Type: Treatment  PT/PTA: PTA     Number of PTA visits since last PT visit: 1 01/16/2025  "

## 2025-01-17 ENCOUNTER — TELEPHONE (OUTPATIENT)
Dept: ORTHOPEDICS | Facility: CLINIC | Age: 89
End: 2025-01-17
Payer: MEDICARE

## 2025-01-17 VITALS
SYSTOLIC BLOOD PRESSURE: 138 MMHG | HEIGHT: 62 IN | DIASTOLIC BLOOD PRESSURE: 67 MMHG | TEMPERATURE: 98 F | BODY MASS INDEX: 26.33 KG/M2 | HEART RATE: 56 BPM | RESPIRATION RATE: 20 BRPM | OXYGEN SATURATION: 95 % | WEIGHT: 143.06 LBS

## 2025-01-17 PROCEDURE — 63600175 PHARM REV CODE 636 W HCPCS: Performed by: NURSE PRACTITIONER

## 2025-01-17 PROCEDURE — 97530 THERAPEUTIC ACTIVITIES: CPT | Mod: CO

## 2025-01-17 PROCEDURE — 97116 GAIT TRAINING THERAPY: CPT

## 2025-01-17 PROCEDURE — 97535 SELF CARE MNGMENT TRAINING: CPT | Mod: CO

## 2025-01-17 PROCEDURE — 97110 THERAPEUTIC EXERCISES: CPT

## 2025-01-17 PROCEDURE — 25000003 PHARM REV CODE 250: Performed by: HOSPITALIST

## 2025-01-17 PROCEDURE — 97110 THERAPEUTIC EXERCISES: CPT | Mod: CO

## 2025-01-17 PROCEDURE — 25000003 PHARM REV CODE 250: Performed by: NURSE PRACTITIONER

## 2025-01-17 RX ADMIN — FERROUS SULFATE TAB 325 MG (65 MG ELEMENTAL FE) 1 EACH: 325 (65 FE) TAB at 09:01

## 2025-01-17 RX ADMIN — ENOXAPARIN SODIUM 40 MG: 40 INJECTION SUBCUTANEOUS at 04:01

## 2025-01-17 RX ADMIN — SENNOSIDES AND DOCUSATE SODIUM 1 TABLET: 50; 8.6 TABLET ORAL at 10:01

## 2025-01-17 NOTE — PT/OT/SLP PROGRESS
"Occupational Therapy   Treatment/Discharge Summary    Name: Taiwo Boyd  MRN: [id number]  Admit Date: 1/3/2025  Admitting Diagnosis:  Fracture of ramus of left pubis    General Precautions: Standard, fall   Orthopedic Precautions: LLE weight bearing as tolerated   Braces: N/A    Recommendations:     Discharge Recommendations:  home with home health  Level of Assistance Recommended at Discharge: 24 hours supervision for safety in performing ADL's and home management tasks  Discharge Equipment Recommendations: 3-in-1 commode, bath bench, hip kit  Barriers to discharge:  Decreased caregiver support    Assessment:     Taiwo Boyd is a 89 y.o. female with a medical diagnosis of Fracture of ramus of left pubis .  She presents with performance deficits affecting function are weakness, impaired endurance, impaired self care skills, impaired functional mobility, gait instability, impaired balance, decreased upper extremity function, decreased lower extremity function, impaired cardiopulmonary response to activity, orthopedic precautions. Pt notefd with bleeding while performing perineal hygiene, pt stated she strained while having BM, nsg notified. Pt participated well during today's session. Pt tolerated tx session without incident and is making progress, however, continues to demonstrate deficits with self care skills, balance, functional mobility, UB strength and endurance. Pt will benefit from continued OT services to progress towards goals.     Rehab Potential is good    Activity tolerance:  Good    Plan:     Patient to be seen 5 x/week to address the above listed problems via self-care/home management, therapeutic activities, therapeutic exercises    Plan of Care Expires: 01/21/25  Plan of Care Reviewed with: patient    Subjective   "I'm leaving later today"  Communicated with: Cristina prior to session.     Pain/Comfort:  Pain Rating 1: 0/10  Pain Rating Post-Intervention 1: 0/10    Patient's cultural, spiritual, " Rastafari conflicts given the current situation:  no    Objective:     Patient found  seated in bedside chair  upon OT entry to room.    Functional Mobility/Transfers:  Patient completed Sit <> Stand Transfer with supervision  with  rolling walker   Patient completed Bed <> Chair Transfer using Step Transfer technique with supervision with rolling walker  Patient completed Toilet Transfer Step Transfer technique with modified independence with  rolling walker and grab bars  Patient completed  Shower Transfer Step Transfer technique with supervision with rolling walker    Activities of Daily Living:  Grooming: modified independence to perform oral/facial hygiene and brush hair seated at sink,   Bathing: supervision to perform upper + lower body cleansing seated on 3-in-1 commode in shower.   Upper Body Dressing: modified independence to doff gown and don bra and pullover shirt seated in wheelchair.   Lower Body Dressing: supervision to thread B LE and manage over hips in stance.   Toileting: supervision to perform perineal hygiene and clothing management in stance   Footwear: Mod I to doff do B socks and don B shoes while seated.     [facility] 6 Click ADL: 24    OT Exercises: UE Ergometer 10 min with moderate  resistance   Therex performed to improve overall endurance, ROM and UB strength required for functional transfers, ADL's and w/c propulsion.     Treatment & Education:  Pt educated on:  - role of OT  - level of assistance  - energy conservation and task modification to maximize independence with ADL's and mobility   -  safety while performing functional transfers and self care tasks  - orthopedic precautions.   - progress towards OT goals     Patient left up in chair with  PT present in rehab gym.     GOALS:   Multidisciplinary Problems       Occupational Therapy Goals       Not on file              Multidisciplinary Problems (Resolved)          Problem: Occupational Therapy    Goal Priority Disciplines Outcome  Interventions   Occupational Therapy Goal   (Resolved)     OT, PT/OT Met    Description: Goals to be met by: 2/3/2025     Patient will increase functional independence with ADLs by performing:    UE Dressing with Set up A- Met  ~UE dressing /c Mod I- Met  LE Dressing with Set-up Assistance- Met  Grooming while seated at sink with Modified [place]- Met  Toileting from toilet with Mod I for hygiene and clothing management- Met  Bathing from  shower chair/bench with Supervision- Met  Toilet transfer to toilet with Modified [place]- Met  Pt will tolerate standing for 10 minutes while completing dynamic task with SPV using LRAD- Met  FWM with mod I using AE as needed- Met                          Time Tracking:     OT Date of Treatment: 01/17/25  OT Start Time: 0816    OT Stop Time: 0910  OT Total Time (min): 54 min    Billable Minutes:Self Care/Home Management 28  Therapeutic Activity 13  Therapeutic Exercise 13    1/17/2025

## 2025-01-17 NOTE — PLAN OF CARE
[facility] - Skilled Nursing  Initial Discharge Assessment     SW met with patient in room for Discharge Planning Assessment.     Preferred Name: Taiwo  Primary Care Provider:    Fernando Lamas     Admission Diagnosis:  Fracture of ramus of left pubis   Admission Date: 1/3/2025  Expected Discharge Date: 1/17/2025     Discharge Barriers Identified:      Payor: Type: [employer] [facility]      Extended Emergency Contact Information:   Primary Emergency Contact: Jailene Boyd  Mobile Phone: [phone]  Relation: daughter  Preferred language: English   needed?      Discharge Plan A: Home  Discharge Plan B:      Preferred Pharmacy:        Initial Discharge Assessment        Assessment Type Discharge Planning Assessment       Source of Information       Communicated PAWAN with patient/caregiver       Lives With alone      Do you expect to return to your current living situation? yes       Do you have help at home or someone to help you manage your care at home?  yes      Prior to hospitalization cognitive status: alert      Current cognitive status: alert      Equipment Currently Used at Home none      Readmission within 30 days?       Patient currently being followed by outpatient case management? no      Do you currently have service(s) that help you manage your care at home? no      Do you take prescription medications?  yes      Do you have prescription coverage?  yes      Do you have any problems affording any of your prescribed medications? no      Who is going to help you get home at discharge? daughter      How do you get to doctors' appointments? daughters     Has lack of transportation kept you from medical appointments, meetings, work, or from getting things needed for daily living?  No        How often do you feel lonely or isolated from those around you? Never     How often do you need to have someone help you when you read instructions, pamphlets, or other  written material from your doctor or pharmacy?  Never      Are you on dialysis?       Do you take coumadin?        DME Needed Upon Discharge        Discharge Plan discussed with:        Discharge Barriers Identified

## 2025-01-17 NOTE — PLAN OF CARE
"  Problem: Physical Therapy  Goal: Physical Therapy Goal  Description: Goals to be met by: 2/4/25     Patient will increase functional independence with mobility by performing:    . Supine to sit with [place]-met  . Sit to supine with [place]-met  . Rolling to Left and Right with [place].-met  . Sit to stand transfer with [place]-met  . Bed to chair transfer with Modified [place] using LRAD-met  . Gait  x 150 feet with Supervision using LRAD- met  . Wheelchair propulsion x150 feet with Supervision using bilateral uppper extremities-not met  . Ascend/descend 4 stair with bilateral Handrails Stand-by Assistance using No Assistive Device. met  . Ascend/Descend 4 inch curb step with Stand-by Assistance using Rolling Walker. met    Rehab Services' DME recommendations    Walker Aubrey (4'4"-5'2")    Accessories N/A  Wheels Yes       Wheelchair  Number of hours up in a wheelchair per day >8hrs/day      Style Light weight    Justification for light weight w/c: patient can and does self-propel in a lightweight wheelchair, patient has impaired ability to participate in MRADLs, mobility limitations cannot be sifficiently resolved with a cane/walker, the home provides adequate access between rooms for a wheelchair, the patient is willing to use a wheelchair in the home, the patient has a caregiver who is available, willing, and able to provide assistance with the wheelchair, and the patient requires additional person for safety with mobility with walker  Seat Width 18 (Standard adult)  Seat Depth 18  Back Height Standard  Leg Support Standard and Swing Away  Arm Height Full, Swing Away, and Adjustable Height  Lap Belt N/A  Accessories Front Brakes and Anti-tippers  Cushion Basic  Justification for Cushion To prevent sacral wounds    Tub bench Standard (unpadded)          Bria Mata, PT 1/15/2025       Outcome: Progressing     "

## 2025-01-17 NOTE — PT/OT/SLP PROGRESS
Physical Therapy Treatment/ Discharge Summary    Patient Name:  Taiwo Boyd   MRN:  [id number]  Admit Date: 1/3/2025  Admitting Diagnosis: Fracture of ramus of left pubis  Recent Surgeries: N/A    General Precautions: Standard, fall  Orthopedic Precautions: LLE weight bearing as tolerated, RLE weight bearing as tolerated  Braces: N/A    Recommendations:     Discharge Recommendations: home health PT  Level of Assistance Recommended at Discharge: Intermittent assistance   Discharge Equipment Recommendations: wheelchair, walker, rolling, bath bench  Barriers to discharge: Decreased caregiver support    Assessment:     Taiwo Boyd is a 89 y.o. female admitted with a medical diagnosis of Fracture of ramus of left pubis . Pt is appropriate for d/c home today.      Performance deficits affecting function: weakness, impaired endurance, impaired self care skills, impaired functional mobility, gait instability, impaired balance, decreased upper extremity function, decreased lower extremity function, decreased ROM, impaired cardiopulmonary response to activity, orthopedic precautions.    Rehab Potential is good    Activity Tolerance: Good    Plan:     Patient to be seen 5 x/week to address the above listed problems via gait training, therapeutic activities, therapeutic exercises, neuromuscular re-education, wheelchair management/training    Plan of Care Expires: 02/03/25  Plan of Care Reviewed with: patient    Subjective     Pt agreeable to work with PT.     Pain/Comfort:  Pain Rating 1: 0/10  Pain Rating Post-Intervention 1: 0/10    Patient's cultural, spiritual, Orthodoxy conflicts given the current situation:  no    Objective:     Communicated with pt prior to session.  Patient found up in chair with   upon PT entry to room.     Therapeutic Activities and Exercises: Nustep with resistance set at 5 for 15mins to improve B l/E ROM, overall MMT and cardiovascular endurance.     Functional Mobility:  Bed Mobility:    "  Rolling Left:  independence  Rolling Right: independence  Supine to Sit: independence  Sit to Supine: independence  Transfers:     Sit to Stand:  modified independence with rolling walker  Bed to Chair: independence with  no AD  using  Stand Pivot  Gait: 120gt with RW and (S) for safety    AM-PAC 6 CLICK MOBILITY  22    Patient left up in chair with call button in reach.    GOALS:   Multidisciplinary Problems       Physical Therapy Goals          Problem: Physical Therapy    Goal Priority Disciplines Outcome Interventions   Physical Therapy Goal     PT, PT/OT Progressing    Description: Goals to be met by: 2/4/25     Patient will increase functional independence with mobility by performing:    . Supine to sit with [place]-met  . Sit to supine with [place]-met  . Rolling to Left and Right with [place].-met  . Sit to stand transfer with [place]-met  . Bed to chair transfer with Modified [place] using LRAD-met  . Gait  x 150 feet with Supervision using LRAD- met  . Wheelchair propulsion x150 feet with Supervision using bilateral uppper extremities-not met  . Ascend/descend 4 stair with bilateral Handrails Stand-by Assistance using No Assistive Device. met  . Ascend/Descend 4 inch curb step with Stand-by Assistance using Rolling Walker. met    Rehab Services' DME recommendations    Walker [place]rey (4'4"-5'2")    Accessories N/A  Wheels Yes       Wheelchair  Number of hours up in a wheelchair per day >8hrs/day      Style Light weight    Justification for light weight w/c: patient can and does self-propel in a lightweight wheelchair, patient has impaired ability to participate in MRADLs, mobility limitations cannot be sifficiently resolved with a cane/walker, the home provides adequate access between rooms for a wheelchair, the patient is willing to use a wheelchair in the home, the patient has a caregiver who is available, willing, and able to provide assistance with the wheelchair, and the " patient requires additional person for safety with mobility with walker  Seat Width 18 (Standard adult)  Seat Depth 18  Back Height Standard  Leg Support Standard and Swing Away  Arm Height Full, Swing Away, and Adjustable Height  Lap Belt N/A  Accessories Front Brakes and Anti-tippers  Cushion Basic  Justification for Cushion To prevent sacral wounds    Tub bench Standard (unpadded)          Bria Mata, PT 1/15/2025                            Time Tracking:     PT Received On: 01/17/25  PT Start Time: 0911  PT Stop Time: 0943  PT Total Time (min): 32 min    Billable Minutes: Gait Training 12 and Therapeutic Exercise 20    Treatment Type: Treatment  PT/PTA: PT     Number of PTA visits since last PT visit: 0     01/17/2025

## 2025-01-17 NOTE — PLAN OF CARE
Problem: Adult Inpatient Plan of Care  Goal: Plan of Care Review  1/17/2025 1729 by Ayse Christie RN  Outcome: Progressing  1/17/2025 1729 by Ayse Christie RN  Outcome: Progressing  Goal: Patient-Specific Goal (Individualized)  1/17/2025 1729 by Ayse Christie RN  Outcome: Progressing  1/17/2025 1729 by Ayse Christie RN  Outcome: Progressing  Goal: Absence of Hospital-Acquired Illness or Injury  1/17/2025 1729 by Ayse Christie RN  Outcome: Progressing  1/17/2025 1729 by Ayse Christie RN  Outcome: Progressing  Goal: Optimal Comfort and Wellbeing  1/17/2025 1729 by Ayse Christie RN  Outcome: Progressing  1/17/2025 1729 by Ayse Christie RN  Outcome: Progressing  Goal: Readiness for Transition of Care  1/17/2025 1729 by Ayse Christie RN  Outcome: Progressing  1/17/2025 1729 by Asye Christie RN  Outcome: Progressing     Problem: Fall Injury Risk  Goal: Absence of Fall and Fall-Related Injury  1/17/2025 1729 by Ayse Christie RN  Outcome: Progressing  1/17/2025 1729 by Ayse Christie RN  Outcome: Progressing     Problem: Skin Injury Risk Increased  Goal: Skin Health and Integrity  1/17/2025 1729 by Ayse Christie RN  Outcome: Progressing  1/17/2025 1729 by Ayse Christie RN  Outcome: Progressing     Ms Maravilla remains alert and oriented.  Able to verbalize needs.  Comprehends her plan of care and her discharge instructions.  Daughter arrived here to pick her up for discharge.

## 2025-01-17 NOTE — PLAN OF CARE
Problem: Occupational Therapy  Goal: Occupational Therapy Goal  Description: Goals to be met by: 2/3/2025     Patient will increase functional independence with ADLs by performing:    UE Dressing with Set up A- Met  ~UE dressing /c Mod I- Met  LE Dressing with Set-up Assistance- Met  Grooming while seated at sink with Modified [place]- Met  Toileting from toilet with Mod I for hygiene and clothing management- Met  Bathing from  shower chair/bench with Supervision- Met  Toilet transfer to toilet with Modified [place]- Met  Pt will tolerate standing for 10 minutes while completing dynamic task with SPV using LRAD- Met  FWM with mod I using AE as needed- Met     Outcome: Met

## 2025-01-17 NOTE — DISCHARGE SUMMARY
"[facility]  Discharge Summary      Patient Name: Taiwo Boyd  MRN: [id number]  KENNEDI: [id number]  Patient Class: - SNF  Admission Date: 1/3/2025  Hospital Length of Stay: 14 days  Discharge Date and Time:  01/17/2025 12:46 PM  Attending Physician: Gustabo Olivarez MD   Discharging Provider: Lorri Bowie NP  Primary Care Provider: Fernando Lamas MD    Primary Care Team: LT[facility] 8 LORRI BOWIE     HPI:   Taiwo Boyd is a 89 y.o.female with a PMHx of Anemia, CKD stage 3, HTN, HLD, and Secondary hyperparathyroidism who presents to SNF following hospital admission for mildly displaced left superior and inferior pubic rami fractures being treated with conservative management.  Admission to SNF for secondary weakness and debility.     Patient originally presented to [facility] ED on 12/28/2024 after suffering a fall on her left hip.  Per hospital notes,  In the ED, CT of left hip demonstrated mildly displaced fractures of the left superior and inferior pubic rami. Orthopedics was consulted and recommended no operative intervention at this time. PT/OT were consulted and recommended SNF placement. Throughout admission, pt was asymptomatically bradycardiac, with HRs in the 50s, and the decision was made to discontinue coreg and HCTZ. On the day of discharge, patient was afebrile with stable vital signs and will be discharged in stable condition to SNF with close follow up."     Patient was admitted with Coreg and hydrochlorothiazide despite hospital notes stating to discontinue.  Will discontinue these medications as this was likely done in error.  Patient states that her pain is well controlled at this time.  She is doing well with therapy at this time.       Patient will be treated at [facility] with PT and OT to improve functional status and ability to perform ADLs.       Hospital Course:   Patient progressed well with PT and OT- last PT note states that patient " ambulated 120gt with RW and (S) for safety. Patient had no significant events during their stay at SNF. Home health was set up. DME was ordered if needed. Follow up appointment to be made by patient within one week. All prescriptions and discharge instructions were ordered to be given to the patient prior to discharge.     PEx  Constitutional: Patient appears debilitated.  No distress noted  Cardiovascular: Normal rate, regular rhythm and normal heart sounds.    Pulmonary/Chest: Effort normal and breath sounds are clear  Abdominal: Soft. Bowel sounds are normal.   Musculoskeletal: Normal range of motion.   Neurological: Alert and oriented to person, place, and time.   Psychiatric: Normal mood and affect. Behavior is normal.   Skin: Skin is warm and dry.        Goals of Care Treatment Preferences:  Code Status: Full Code    Living Will: Yes                 Consults:   Consults (From admission, onward)          Status Ordering Provider     Inpatient consult to Registered Dietitian/Nutritionist  Once        Provider:  (Not yet assigned)    Completed GERSON PALOMARES notes have been filed under this hospital service.  Service: Hospital Medicine    Final Active Diagnoses:    Diagnosis Date Noted POA    PRINCIPAL PROBLEM:  Fracture of ramus of left pubis [S32.592A] 12/29/2024 Yes    Primary hypertension [I10] 01/03/2025 Yes    Essential hypertension [I10] 04/05/2022 Yes     Chronic    Secondary hyperparathyroidism of renal origin [N25.81] 04/05/2022 Yes    High cholesterol [E78.00] 05/27/2021 Yes     Chronic    Vitamin D deficiency [E55.9] 03/03/2020 Yes    Anemia due to stage 3a chronic kidney disease [N18.31, D63.1] 04/19/2018 Yes      Problems Resolved During this Admission:       Discharged Condition: stable    Disposition: Home-Health Care [facility]    Follow Up:    Patient Instructions:      WHEELCHAIR FOR HOME USE     Order Specific Question Answer Comments   Hours in W/C per day: 8    Type of  "Wheelchair: Lightweight    Patient unable to propel in Standard wheelchair? Yes    Size(Width): 18"(STD adult)    Leg Support: STD footrests    Leg Support: Swing Away    Arm Height: Full length    Arm Height: Swing away    Arm Height: Adjust height    Lap Belt: Velcro    Accessories: Anti-tippers    Accessories: Front brakes    Cushion: Basic    Justification for cushion: Prevent pressure ulcers    Reclining Back No    Height: 5' 2" (1.575 m)    Weight: 64.9 kg (143 lb 1.3 oz)    Does patient have medical equipment at home? cane, quad    Does patient have medical equipment at home? walker, rolling    Length of need (1-99 months): 99    Please check all that apply: Caregiver is capable and willing to operate wheelchair safely.    Please check all that apply: The patient requires the use of a w/c for activities of daily living within the Home.    Please check all that apply: Patient mobility limitations cannot be sufficiently resolved by the use of other ambulatory therapies.      WALKER FOR HOME USE     Order Specific Question Answer Comments   Type of Walker: Aubrey (4'4"-5'6")    With wheels? Yes    Height: 5' 2" (1.575 m)    Weight: 64.9 kg (143 lb 1.3 oz)    Length of need (1-99 months): 99    Does patient have medical equipment at home? cane, quad    Does patient have medical equipment at home? walker, rolling    Please check all that apply: Patient's condition impairs ambulation.    Please check all that apply: Walker will be used for gait training.    Please check all that apply: Patient is unable to safely ambulate without equipment.      TRANSFER TUB BENCH FOR HOME USE     Order Specific Question Answer Comments   Type of Transfer Tub Bench: Unpadded    Height: 5' 2" (1.575 m)    Weight: 64.9 kg (143 lb 1.3 oz)    Does patient have medical equipment at home? cane, quad    Does patient have medical equipment at home? walker, rolling    Length of need (1-99 months): 99    Patient notified - Not covered by " insurance considered a convenience item No    Discussed financial responsibility with responsible party No      Other restrictions (specify):     No driving until:   Order Comments: Cleared by PCP     Notify your health care provider if you experience any of the following:  temperature >100.4     Notify your health care provider if you experience any of the following:  persistent nausea and vomiting or diarrhea     Notify your health care provider if you experience any of the following:  severe uncontrolled pain     Notify your health care provider if you experience any of the following:  redness, tenderness, or signs of infection (pain, swelling, redness, odor or green/yellow discharge around incision site)     Notify your health care provider if you experience any of the following:  difficulty breathing or increased cough     Notify your health care provider if you experience any of the following:  severe persistent headache     Notify your health care provider if you experience any of the following:  worsening rash     Notify your health care provider if you experience any of the following:  persistent dizziness, light-headedness, or visual disturbances     Notify your health care provider if you experience any of the following:  increased confusion or weakness       Significant Diagnostic Studies: N/A    Pending Diagnostic Studies:       None           Medications:  Reconciled Home Medications:      Medication List        START taking these medications      polyethylene glycol 17 gram Pwpk  Commonly known as: GLYCOLAX  Take 17 g by mouth 2 (two) times daily as needed for Constipation.     senna-docusate 8.6-50 mg 8.6-50 mg per tablet  Commonly known as: PERICOLACE  Take 1 tablet by mouth 2 (two) times daily as needed for Constipation.     traMADoL 100 mg Tab  Take 100 mg by mouth every 8 (eight) hours as needed for Pain.            CONTINUE taking these medications      atorvastatin 40 MG tablet  Commonly known  as: LIPITOR  Take 1 tablet by mouth once daily     ergocalciferol 50,000 unit Cap  Commonly known as: ERGOCALCIFEROL  Take 1 capsule (50,000 Units total) by mouth every 30 days.     ferrous sulfate 325 mg (65 mg iron) Tab tablet  Commonly known as: FEOSOL  Take 325 mg by mouth once daily.     irbesartan 300 MG tablet  Commonly known as: AVAPRO  Take 1 tablet by mouth once daily            STOP taking these medications      acetaminophen 500 MG tablet  Commonly known as: TYLENOL              Indwelling Lines/Drains at time of discharge:   Lines/Drains/Airways       Drain  Duration             Female External Urinary Catheter w/ Suction 01/03/25 2000 13 days                    Time spent on the discharge of patient: 38 minutes         Lorri Bowie NP  Department of Hospital Medicine  [facility]

## 2025-01-17 NOTE — TELEPHONE ENCOUNTER
----- Message from [employer] sent at 1/17/2025 11:42 AM CST -----  Pt Requesting to Reschedule an Appointment     Pt is requesting to Reschedule an appointment that our scheduling dept cannot Reschedule.    Who called: pt  Initial appt date: 2/18/25  When pt wants appt: 2/18/25 for 12:30pm  Reason: pt didn't say  Best call back #: [phone](hospital number to where pt is; pt said she is not sure of area code but that she is in [place] or [place]) [phone] (pt cell)  Additional notes: pt only wants to change time

## 2025-01-17 NOTE — TELEPHONE ENCOUNTER
Called and spoke with pt. Pt wanted to move appt to 12:30 on same day. Told pt we could move appt to 1:00 since clinic is at lunch 12-1. Pt verbalized understanding.

## 2025-01-21 ENCOUNTER — NURSE TRIAGE (OUTPATIENT)
Dept: ADMINISTRATIVE | Facility: CLINIC | Age: 89
End: 2025-01-21
Payer: MEDICARE

## 2025-01-21 NOTE — TELEPHONE ENCOUNTER
"Patient c/o rectal bleeding. Advised per protocol to be seen in the next 2-3 days. Patient verbalizes understanding. Advised the patient to call back with any further questions or if symptoms worsen.        Reason for Disposition   MILD rectal bleeding (e.g., more than just a few drops or streaks)    Additional Information   Negative: Injury to rectum   Negative: Large mass protruding out of rectum   Negative: Patient sounds very sick or weak to the triager   Negative: SEVERE rectal pain (e.g., excruciating, unable to have a bowel movement)   Negative: [1] Rectal pain or redness AND [2] fever   Negative: [1] Sudden onset rectal pain AND [2] constipated (straining, rectal pressure or fullness) AND [3] NOT better after SITZ bath, suppository or enema   Negative: MODERATE-SEVERE rectal pain (i.e., interferes with school, work, or sleep)   Negative: MODERATE-SEVERE rectal itching (i.e., interferes with school, work, or sleep)   Negative: Rash of rectal area (e.g., open sore, painful tiny water blisters, unexplained bumps)   Negative: Patient is worried they have a sexually transmitted infection (STI)   Negative: Rectal area looks infected (e.g., draining sore, spreading redness)   Negative: Last bowel movement (BM) > 4 days ago   Negative: [1] Home treatment > 3 days for rectal pain AND [2] not improved   Negative: Shock suspected (e.g., cold/pale/clammy skin, too weak to stand, low BP, rapid pulse)   Negative: Difficult to awaken or acting confused (e.g., disoriented, slurred speech)   Negative: Passed out (e.g., fainted, lost consciousness, blacked out and was not responding)   Negative: [1] Vomiting AND [2] contains red blood or black ("coffee ground") material  (Exception: Few red streaks in vomit that only happened once.)   Negative: Sounds like a life-threatening emergency to the triager   Negative: SEVERE rectal bleeding (e.g., large blood clots; constant or on and off bleeding)   Negative: SEVERE dizziness " (e.g., unable to stand, requires support to walk, feels like passing out now)   Negative: [1] MODERATE rectal bleeding (e.g., small blood clots, passing blood without stool, or toilet water turns red) AND [2] more than once a day   Negative: Pale skin (pallor) of new-onset or getting worse   Negative: Black or tarry bowel movements  (Exception: Chronic-unchanged black-grey BMs AND is taking iron pills or Pepto-Bismol.)   Negative: [1] Constant abdominal pain AND [2] present > 2 hours   Negative: Rectal foreign body (i.e., now or within past week;  inserted or swallowed)   Negative: High-risk adult (e.g., prior surgery on aorta, abdominal aortic aneurysm)   Negative: Taking Coumadin (warfarin) or other strong blood thinner, or known bleeding disorder (e.g., thrombocytopenia)   Negative: Known cirrhosis of the liver (or history of liver failure or ascites)   Negative: [1] Colonoscopy AND [2] in past 72 hours   Negative: Patient sounds very sick or weak to the triager   Negative: MODERATE rectal bleeding (e.g., small blood clots, passing blood without stool, or toilet water turns red)    Protocols used: Rectal Symptoms-A-AH, Rectal Bleeding-A-AH

## 2025-01-27 ENCOUNTER — TELEPHONE (OUTPATIENT)
Dept: ORTHOPEDICS | Facility: CLINIC | Age: 89
End: 2025-01-27
Payer: MEDICARE

## 2025-01-27 DIAGNOSIS — S32.592A PUBIC RAMUS FRACTURE, LEFT, CLOSED, INITIAL ENCOUNTER: Primary | ICD-10-CM

## 2025-01-27 NOTE — TELEPHONE ENCOUNTER
----- Message from [employer] sent at 1/27/2025 11:56 AM CST -----  Regarding: appt  Name of Who is Calling:Pt         What is the request in detail: Inquiring if she has to take xray's for upcoming Post op           Can the clinic reply by NOVANER: no         What Number to [facility] Back [facility]:Telephone Information:  [employer]          [phone]

## 2025-02-18 ENCOUNTER — HOSPITAL ENCOUNTER (OUTPATIENT)
Dept: RADIOLOGY | Facility: HOSPITAL | Age: 89
Discharge: HOME | End: 2025-02-18
Attending: ORTHOPAEDIC SURGERY
Payer: MEDICARE

## 2025-02-18 ENCOUNTER — OFFICE VISIT (OUTPATIENT)
Dept: ORTHOPEDICS | Facility: CLINIC | Age: 89
End: 2025-02-18
Payer: MEDICARE

## 2025-02-18 VITALS — BODY MASS INDEX: 25.97 KG/M2 | WEIGHT: 142 LBS

## 2025-02-18 DIAGNOSIS — S32.592A PUBIC RAMUS FRACTURE, LEFT, CLOSED, INITIAL ENCOUNTER: ICD-10-CM

## 2025-02-18 DIAGNOSIS — S32.592S CLOSED FRACTURE OF MULTIPLE PUBIC RAMI, LEFT, SEQUELA: Primary | ICD-10-CM

## 2025-02-18 PROCEDURE — 72190 X-RAY EXAM OF PELVIS: CPT | Mod: TC,FY

## 2025-02-18 NOTE — PROGRESS NOTES
Patient ID:   Taiwo Boyd is a 89 y.o. female.    Chief Complaint:   Follow-up evaluation for pubic rami fractures    HPI:   The patient has sustained left-sided pelvic pubic rami fractures about 6 weeks ago.  She was treated non operatively.  She returns today with no pain.  She has been ambulating.    Medications:  Current Medications[1]    Allergies:  Review of patient's allergies indicates:   Allergen Reactions    Codeine phosphate Rash and Other (See Comments)       Past Medical History:  Past Medical History:   Diagnosis Date    CKD (chronic kidney disease) stage 3     High cholesterol 05/27/2021    Hypertension     Pelvic fracture 12/28/2024        Past Surgical History:  Past Surgical History:   Procedure Laterality Date    APPENDECTOMY      HYSTERECTOMY      TONSILLECTOMY         Social History:  Social History     Occupational History    Not on file   Tobacco Use    Smoking status: Never    Smokeless tobacco: Never   Substance and Sexual Activity    Alcohol use: No    Drug use: No    Sexual activity: Yes     Comment:  lives with spouse       Family History:  Family History   Family history unknown: Yes        ROS:  Review of Systems   Musculoskeletal:  Negative for joint pain.   All other systems reviewed and are negative.      Vitals:  Wt 64.4 kg (141 lb 15.6 oz)   BMI 25.97 kg/m²     Physical Examination:  Comprehensive Orthopaedic Musculoskeletal Exam    General      Constitutional: appears stated age, well-developed and well-nourished    Scleral icterus: no    Labored breathing: no    Psychiatric: normal mood and affect and no acute distress    Neurological: alert and oriented x3    Skin: intact    Lymphadenopathy: none     Ortho Exam   Negative log roll left hip    Imaging:  I have ordered and independently reviewed the following imaging studies performed at [facility] today    Plain x-rays of the pelvis performed today demonstrates healing of the left pubic rami fractures    Assessment:  1.  Closed fracture of multiple pubic rami, left, sequela    2. Pubic ramus fracture, left, closed, initial encounter        Plan:  The patient may progress her activity as tolerated.  Follow up as needed.     No follow-ups on file.              [1]   Current Outpatient Medications:     atorvastatin (LIPITOR) 40 MG tablet, Take 1 tablet by mouth once daily, Disp: 90 tablet, Rfl: 3    ergocalciferol (ERGOCALCIFEROL) 50,000 unit Cap, Take 1 capsule (50,000 Units total) by mouth every 30 days., Disp: 3 capsule, Rfl: 3    ferrous sulfate 325 mg (65 mg iron) Tab tablet, Take 325 mg by mouth once daily., Disp: , Rfl:     furosemide (LASIX) 20 MG tablet, Take 1 tablet (20 mg total) by mouth daily as needed (Swelling)., Disp: 30 tablet, Rfl: 11    irbesartan (AVAPRO) 300 MG tablet, Take 1 tablet by mouth once daily, Disp: 90 tablet, Rfl: 3    polyethylene glycol (GLYCOLAX) 17 gram PwPk, Take 17 g by mouth 2 (two) times daily as needed for Constipation., Disp: , Rfl:     senna-docusate 8.6-50 mg (PERICOLACE) 8.6-50 mg per tablet, Take 1 tablet by mouth 2 (two) times daily as needed for Constipation., Disp: , Rfl:     traMADoL 100 mg Tab, Take 100 mg by mouth every 8 (eight) hours as needed for Pain., Disp: 20 tablet, Rfl: 0

## 2025-03-01 ENCOUNTER — LAB VISIT (OUTPATIENT)
Dept: LAB | Facility: HOSPITAL | Age: 89
End: 2025-03-01
Attending: INTERNAL MEDICINE
Payer: MEDICARE

## 2025-03-01 DIAGNOSIS — N18.31 STAGE 3A CHRONIC KIDNEY DISEASE: ICD-10-CM

## 2025-03-01 DIAGNOSIS — N25.81 SECONDARY HYPERPARATHYROIDISM OF RENAL ORIGIN: ICD-10-CM

## 2025-03-01 DIAGNOSIS — E55.9 VITAMIN D DEFICIENCY: ICD-10-CM

## 2025-03-01 DIAGNOSIS — D63.1 ANEMIA IN STAGE 3A CHRONIC KIDNEY DISEASE: ICD-10-CM

## 2025-03-01 DIAGNOSIS — N18.31 ANEMIA IN STAGE 3A CHRONIC KIDNEY DISEASE: ICD-10-CM

## 2025-03-01 LAB
25(OH)D3+25(OH)D2 SERPL-MCNC: 34 NG/ML (ref 30–96)
ALBUMIN SERPL BCP-MCNC: 3.9 G/DL (ref 3.5–5.2)
ANION GAP SERPL CALC-SCNC: 11 MMOL/L (ref 8–16)
BASOPHILS # BLD AUTO: 0.03 K/UL (ref 0–0.2)
BASOPHILS NFR BLD: 0.7 % (ref 0–1.9)
BUN SERPL-MCNC: 25 MG/DL (ref 8–23)
CALCIUM SERPL-MCNC: 10 MG/DL (ref 8.7–10.5)
CHLORIDE SERPL-SCNC: 103 MMOL/L (ref 95–110)
CO2 SERPL-SCNC: 24 MMOL/L (ref 23–29)
CREAT SERPL-MCNC: 0.9 MG/DL (ref 0.5–1.4)
DIFFERENTIAL METHOD BLD: ABNORMAL
EOSINOPHIL # BLD AUTO: 0.1 K/UL (ref 0–0.5)
EOSINOPHIL NFR BLD: 3 % (ref 0–8)
ERYTHROCYTE [DISTWIDTH] IN BLOOD BY AUTOMATED COUNT: 13.2 % (ref 11.5–14.5)
EST. GFR  (NO RACE VARIABLE): >60 ML/MIN/1.73 M^2
FERRITIN SERPL-MCNC: 314 NG/ML (ref 20–300)
GLUCOSE SERPL-MCNC: 86 MG/DL (ref 70–110)
HCT VFR BLD AUTO: 35.5 % (ref 37–48.5)
HGB BLD-MCNC: 11.4 G/DL (ref 12–16)
IMM GRANULOCYTES # BLD AUTO: 0.01 K/UL (ref 0–0.04)
IMM GRANULOCYTES NFR BLD AUTO: 0.2 % (ref 0–0.5)
IRON SERPL-MCNC: 61 UG/DL (ref 30–160)
LYMPHOCYTES # BLD AUTO: 1.9 K/UL (ref 1–4.8)
LYMPHOCYTES NFR BLD: 42.2 % (ref 18–48)
MAGNESIUM SERPL-MCNC: 1.9 MG/DL (ref 1.6–2.6)
MCH RBC QN AUTO: 29.8 PG (ref 27–31)
MCHC RBC AUTO-ENTMCNC: 32.1 G/DL (ref 32–36)
MCV RBC AUTO: 93 FL (ref 82–98)
MONOCYTES # BLD AUTO: 0.3 K/UL (ref 0.3–1)
MONOCYTES NFR BLD: 7.5 % (ref 4–15)
NEUTROPHILS # BLD AUTO: 2 K/UL (ref 1.8–7.7)
NEUTROPHILS NFR BLD: 46.4 % (ref 38–73)
NRBC BLD-RTO: 0 /100 WBC
PHOSPHATE SERPL-MCNC: 3.1 MG/DL (ref 2.7–4.5)
PLATELET # BLD AUTO: 258 K/UL (ref 150–450)
PMV BLD AUTO: 11.3 FL (ref 9.2–12.9)
POTASSIUM SERPL-SCNC: 4.2 MMOL/L (ref 3.5–5.1)
PTH-INTACT SERPL-MCNC: 72.7 PG/ML (ref 9–77)
RBC # BLD AUTO: 3.83 M/UL (ref 4–5.4)
SATURATED IRON: 22 % (ref 20–50)
SODIUM SERPL-SCNC: 138 MMOL/L (ref 136–145)
TOTAL IRON BINDING CAPACITY: 272 UG/DL (ref 250–450)
TRANSFERRIN SERPL-MCNC: 184 MG/DL (ref 200–375)
URATE SERPL-MCNC: 5.7 MG/DL (ref 2.4–5.7)
WBC # BLD AUTO: 4.38 K/UL (ref 3.9–12.7)

## 2025-03-01 PROCEDURE — 83540 ASSAY OF IRON: CPT | Performed by: INTERNAL MEDICINE

## 2025-03-01 PROCEDURE — 83735 ASSAY OF MAGNESIUM: CPT | Performed by: INTERNAL MEDICINE

## 2025-03-01 PROCEDURE — 82728 ASSAY OF FERRITIN: CPT | Performed by: INTERNAL MEDICINE

## 2025-03-01 PROCEDURE — 80069 RENAL FUNCTION PANEL: CPT | Performed by: INTERNAL MEDICINE

## 2025-03-01 PROCEDURE — 84550 ASSAY OF BLOOD/URIC ACID: CPT | Performed by: INTERNAL MEDICINE

## 2025-03-01 PROCEDURE — 85025 COMPLETE CBC W/AUTO DIFF WBC: CPT | Performed by: INTERNAL MEDICINE

## 2025-03-01 PROCEDURE — 83970 ASSAY OF PARATHORMONE: CPT | Performed by: INTERNAL MEDICINE

## 2025-03-01 PROCEDURE — 82306 VITAMIN D 25 HYDROXY: CPT | Performed by: INTERNAL MEDICINE

## 2025-03-01 PROCEDURE — 36415 COLL VENOUS BLD VENIPUNCTURE: CPT | Performed by: INTERNAL MEDICINE

## 2025-03-05 ENCOUNTER — EXTERNAL HOME HEALTH (OUTPATIENT)
Dept: HOME HEALTH SERVICES | Facility: HOSPITAL | Age: 89
End: 2025-03-05
Payer: MEDICARE

## 2025-03-17 ENCOUNTER — DOCUMENT SCAN (OUTPATIENT)
Dept: HOME HEALTH SERVICES | Facility: HOSPITAL | Age: 89
End: 2025-03-17
Payer: MEDICARE